# Patient Record
Sex: MALE | Race: WHITE | NOT HISPANIC OR LATINO | Employment: OTHER | URBAN - METROPOLITAN AREA
[De-identification: names, ages, dates, MRNs, and addresses within clinical notes are randomized per-mention and may not be internally consistent; named-entity substitution may affect disease eponyms.]

---

## 2017-01-21 DIAGNOSIS — K52.9 COLITIS: ICD-10-CM

## 2017-01-21 DIAGNOSIS — K92.1 BLOOD IN STOOL: ICD-10-CM

## 2017-01-21 DIAGNOSIS — K52.9 IBD (INFLAMMATORY BOWEL DISEASE): ICD-10-CM

## 2017-01-21 DIAGNOSIS — D50.9 MICROCYTIC ANEMIA: ICD-10-CM

## 2017-01-21 DIAGNOSIS — R19.7 DIARRHEA: ICD-10-CM

## 2017-01-21 DIAGNOSIS — K51.90 ULCERATIVE COLITIS WITHOUT COMPLICATIONS: ICD-10-CM

## 2017-01-21 DIAGNOSIS — Z79.899 ENCOUNTER FOR LONG-TERM (CURRENT) USE OF MEDICATIONS: ICD-10-CM

## 2017-01-22 RX ORDER — MESALAMINE 375 MG/1
CAPSULE, EXTENDED RELEASE ORAL
Qty: 120 CAPSULE | Refills: 0 | Status: SHIPPED | OUTPATIENT
Start: 2017-01-22 | End: 2017-02-18 | Stop reason: SDUPTHER

## 2017-02-18 DIAGNOSIS — K92.1 BLOOD IN STOOL: ICD-10-CM

## 2017-02-18 DIAGNOSIS — D50.9 MICROCYTIC ANEMIA: ICD-10-CM

## 2017-02-18 DIAGNOSIS — K52.9 COLITIS: ICD-10-CM

## 2017-02-18 DIAGNOSIS — K51.90 ULCERATIVE COLITIS WITHOUT COMPLICATIONS: ICD-10-CM

## 2017-02-18 DIAGNOSIS — Z79.899 ENCOUNTER FOR LONG-TERM (CURRENT) USE OF MEDICATIONS: ICD-10-CM

## 2017-02-18 DIAGNOSIS — K52.9 IBD (INFLAMMATORY BOWEL DISEASE): ICD-10-CM

## 2017-02-18 DIAGNOSIS — R19.7 DIARRHEA: ICD-10-CM

## 2017-02-18 RX ORDER — MESALAMINE 375 MG/1
CAPSULE, EXTENDED RELEASE ORAL
Qty: 120 CAPSULE | Refills: 0 | Status: SHIPPED | OUTPATIENT
Start: 2017-02-18 | End: 2017-03-26 | Stop reason: SDUPTHER

## 2017-03-26 DIAGNOSIS — K51.90 ULCERATIVE COLITIS WITHOUT COMPLICATIONS: ICD-10-CM

## 2017-03-26 DIAGNOSIS — K52.9 IBD (INFLAMMATORY BOWEL DISEASE): ICD-10-CM

## 2017-03-26 DIAGNOSIS — R19.7 DIARRHEA: ICD-10-CM

## 2017-03-26 DIAGNOSIS — Z79.899 ENCOUNTER FOR LONG-TERM (CURRENT) USE OF MEDICATIONS: ICD-10-CM

## 2017-03-26 DIAGNOSIS — D50.9 MICROCYTIC ANEMIA: ICD-10-CM

## 2017-03-26 DIAGNOSIS — K52.9 COLITIS: ICD-10-CM

## 2017-03-26 DIAGNOSIS — K92.1 BLOOD IN STOOL: ICD-10-CM

## 2017-03-27 RX ORDER — MESALAMINE 0.38 G/1
1.5 CAPSULE, EXTENDED RELEASE ORAL DAILY
Qty: 120 CAPSULE | Refills: 5 | Status: SHIPPED | OUTPATIENT
Start: 2017-03-27 | End: 2017-06-27 | Stop reason: SDUPTHER

## 2017-04-12 ENCOUNTER — OFFICE VISIT (OUTPATIENT)
Dept: INTERNAL MEDICINE | Facility: CLINIC | Age: 47
End: 2017-04-12
Payer: COMMERCIAL

## 2017-04-12 VITALS
HEIGHT: 69 IN | HEART RATE: 81 BPM | SYSTOLIC BLOOD PRESSURE: 178 MMHG | BODY MASS INDEX: 38.85 KG/M2 | DIASTOLIC BLOOD PRESSURE: 100 MMHG | WEIGHT: 262.31 LBS

## 2017-04-12 DIAGNOSIS — F41.1 GAD (GENERALIZED ANXIETY DISORDER): ICD-10-CM

## 2017-04-12 DIAGNOSIS — I10 ESSENTIAL HYPERTENSION: ICD-10-CM

## 2017-04-12 PROCEDURE — 1160F RVW MEDS BY RX/DR IN RCRD: CPT | Mod: S$GLB,,, | Performed by: INTERNAL MEDICINE

## 2017-04-12 PROCEDURE — 3080F DIAST BP >= 90 MM HG: CPT | Mod: S$GLB,,, | Performed by: INTERNAL MEDICINE

## 2017-04-12 PROCEDURE — 99999 PR PBB SHADOW E&M-EST. PATIENT-LVL III: CPT | Mod: PBBFAC,,, | Performed by: INTERNAL MEDICINE

## 2017-04-12 PROCEDURE — 3077F SYST BP >= 140 MM HG: CPT | Mod: S$GLB,,, | Performed by: INTERNAL MEDICINE

## 2017-04-12 PROCEDURE — 99213 OFFICE O/P EST LOW 20 MIN: CPT | Mod: S$GLB,,, | Performed by: INTERNAL MEDICINE

## 2017-04-12 RX ORDER — BUSPIRONE HYDROCHLORIDE 15 MG/1
15 TABLET ORAL 2 TIMES DAILY
Qty: 60 TABLET | Refills: 5 | Status: SHIPPED | OUTPATIENT
Start: 2017-04-12 | End: 2017-12-06 | Stop reason: SDUPTHER

## 2017-04-12 RX ORDER — METOPROLOL SUCCINATE 50 MG/1
50 TABLET, EXTENDED RELEASE ORAL DAILY
Qty: 30 TABLET | Refills: 2 | Status: SHIPPED | OUTPATIENT
Start: 2017-04-12 | End: 2017-06-30 | Stop reason: SDUPTHER

## 2017-04-12 NOTE — MR AVS SNAPSHOT
[facility] Internal Medicine  [address] [facility] [address]  Phone: [phone]  Fax: [phone]                  Jl Araiza   2017 10:00 AM   Office Visit    Description:  Male : 1970   Provider:  Corine Taylor MD   Department:  [facility] Internal Medicine           Reason for Visit     Hypertension           Diagnoses this Visit        Comments    Essential hypertension         MYAH (generalized anxiety disorder)                To Do List           Future Appointments        Provider Department Dept Phone    5/10/2017 10:00 AM NURSE, [facility] INTERNAL MEDICINE [facility] [phone]    2017 11:30 AM Edmund Marie MD [facility] Gastroenterology [phone]    2017 10:00 AM Corine Taylor MD [facility] [phone]      Goals (5 Years of Data)     None      Follow-Up and Disposition     Return in about 3 months (around 2017).       These Medications        Disp Refills Start End    metoprolol succinate (TOPROL-XL) 50 MG 24 hr tablet 30 tablet 2 2017     Take 1 tablet (50 mg total) by mouth once daily. - Oral    Pharmacy: [facility] [address] [place] [id number] [address] [facility] Ph #: [phone]       Notes to Pharmacy: Dose increased, cancel 25mg now.    busPIRone (BUSPAR) 15 MG tablet 60 tablet 5 2017     Take 1 tablet (15 mg total) by mouth 2 (two) times daily. - Oral    Pharmacy: [facility] [place] [id number] [facility] Ph #: [phone]         [facility]     [facility] Nurse Care Line -  Assistance  Unless otherwise directed by your provider, please contact [facility], our nurse care line that is available for  assistance.     Registered nurses in the [facility] provide: appointment scheduling, clinical advisement, health education, and other advisory services.  Call:  "[phone] (toll free)               Medications           Message regarding Medications     Verify the changes and/or additions to your medication regime listed below are the same as discussed with your clinician today.  If any of these changes or additions are incorrect, please notify your healthcare provider.        CHANGE how you are taking these medications     Start Taking Instead of    metoprolol succinate (TOPROL-XL) 50 MG 24 hr tablet metoprolol succinate (TOPROL-XL) 25 MG 24 hr tablet    Dosage:  Take 1 tablet (50 mg total) by mouth once daily. Dosage:  Take 1 tablet (25 mg total) by mouth once daily.    Reason for Change:  Dose adjustment            Verify that the below list of medications is an accurate representation of the medications you are currently taking.  If none reported, the list may be blank. If incorrect, please contact your healthcare provider. Carry this list with you in case of emergency.           Current Medications     busPIRone (BUSPAR) 15 MG tablet Take 1 tablet (15 mg total) by mouth 2 (two) times daily.    mesalamine (APRISO) 0.375 gram Cp24 Take 4 capsules (1.5 g total) by mouth once daily.    metoprolol succinate (TOPROL-XL) 50 MG 24 hr tablet Take 1 tablet (50 mg total) by mouth once daily.    nystatin (MYCOSTATIN) cream Apply topically 2 (two) times daily. Take for 2-4 weeks until resolves    predniSONE (DELTASONE) 20 MG tablet Take 20 mg by mouth once daily.           Clinical Reference Information           Your Vitals Were     BP Pulse Height Weight BMI    178/100 81 5' 9" (1.753 m) 119 kg (262 lb 4.8 oz) 38.73 kg/m2      Blood Pressure          Most Recent Value    BP  (!)  178/100      Allergies as of 4/12/2017     Cat/feline Products    Cigarette Smoke      Immunizations Administered on Date of Encounter - 4/12/2017     None      Language Assistance Services     ATTENTION: Language assistance services are available, free of charge. Please call [phone].  "     ATENCIÓN: Si habla español, tiene a garza disposición servicios gratuitos de asistencia lingüística. Jailene al [phone].     CHÚ Ý: N?u b?n nói Ti?ng Vi?t, có các d?ch v? h? tr? ngôn ng? mi?n phí dành cho b?n. G?i s? [phone].         [facility] complies with applicable Federal civil rights laws and does not discriminate on the basis of race, color, national origin, age, disability, or sex.

## 2017-04-13 NOTE — PROGRESS NOTES
Subjective:       Patient ID: Jl Araiza is a 47 y.o. male.    Chief Complaint: Hypertension    HPI Comments: Last seen 5 months ago. Blood pressure was mildly elevated in the office but normal at home, med not changed. Anxiety was improved on Buspar - continued the same. Returns for f/u feeling well. Reports home BP checked weekly ranges 130-140/88-92. Compliant with daily dosing of Metoprolol XL 25mg daily, no adverse effects. Did not have a particularly stressful morning today, blood pressure tends to rise when he comes here.     PMH:   Hypertension.   Pre-Diabetes, HbA1c 6.3%.  Ulcerative Colitis  Iron Def Anemia  Allergic Rhinitis  Depression/Anxiety  Obesity.   PSA normal 1.6, TSH normal 1.73, CBC, CMP and Lipids normal 9/16.     PSH: Umbilical Hernia Repair. EGD and Colonoscopy 6/16.     Social: Non-smoker, rare alcohol.  with an 7 yo son. .     Hep B vaccine 2015, Prevnar 4/15. Pneumovax 11/15. Tdap 11/15. Flu shot 9/16.    NKDA.    Medications: list reviewed and reconciled.               Review of Systems   Constitutional: Negative for appetite change, fatigue and unexpected weight change.   Eyes: Negative for visual disturbance.   Respiratory: Negative for cough and shortness of breath.    Cardiovascular: Negative for chest pain, palpitations and leg swelling.   Neurological: Negative for dizziness, syncope and headaches.   Psychiatric/Behavioral: Negative for agitation, behavioral problems and dysphoric mood. The patient is not nervous/anxious.        Objective:    /100, repeat by me unchanged, Pulse 81, Wt 262 lbs, BMI=38.7  Physical Exam   Constitutional: He is oriented to person, place, and time. He appears well-developed and well-nourished. No distress.   HENT:   Nose: Nose normal.   Mouth/Throat: Oropharynx is clear and moist.   Eyes: Conjunctivae are normal. No scleral icterus.   Cardiovascular: Normal rate, regular rhythm and normal heart sounds.    Pulmonary/Chest:  Effort normal and breath sounds normal. No respiratory distress. He has no wheezes. He has no rales.   Musculoskeletal: Normal range of motion. He exhibits no edema.   Neurological: He is alert and oriented to person, place, and time. No cranial nerve deficit.   Skin: Skin is warm and dry. He is not diaphoretic.   Psychiatric: He has a normal mood and affect. His behavior is normal. Thought content normal.       Assessment:       1. Essential hypertension    2. MYAH (generalized anxiety disorder)        Plan:       Essential hypertension not at goal, even by home monitoring  -     Increase Metoprolol succinate (TOPROL-XL) 50 MG 24 hr tablet; Take 1 tablet (50 mg total) by mouth once daily.  Dispense: 30 tablet; Refill: 2  -     Continue home monitoring and email readings in two weeks.   BP check scheduled with the nurse here in 4 weeks.   Encouraged diet and exercise for weight loss and cardiovascular health.    MYAH (generalized anxiety disorder) improved, continue same.  -     busPIRone (BUSPAR) 15 MG tablet; Take 1 tablet (15 mg total) by mouth 2 (two) times daily.  Dispense: 60 tablet; Refill: 5

## 2017-06-15 ENCOUNTER — PATIENT MESSAGE (OUTPATIENT)
Dept: GASTROENTEROLOGY | Facility: CLINIC | Age: 47
End: 2017-06-15

## 2017-06-27 ENCOUNTER — OFFICE VISIT (OUTPATIENT)
Dept: GASTROENTEROLOGY | Facility: CLINIC | Age: 47
End: 2017-06-27
Payer: COMMERCIAL

## 2017-06-27 ENCOUNTER — LAB VISIT (OUTPATIENT)
Dept: LAB | Facility: HOSPITAL | Age: 47
End: 2017-06-27
Attending: INTERNAL MEDICINE
Payer: COMMERCIAL

## 2017-06-27 VITALS
DIASTOLIC BLOOD PRESSURE: 95 MMHG | WEIGHT: 273.56 LBS | HEART RATE: 78 BPM | BODY MASS INDEX: 40.52 KG/M2 | HEIGHT: 69 IN | SYSTOLIC BLOOD PRESSURE: 148 MMHG

## 2017-06-27 DIAGNOSIS — K52.9 IBD (INFLAMMATORY BOWEL DISEASE): ICD-10-CM

## 2017-06-27 DIAGNOSIS — K52.9 COLITIS: ICD-10-CM

## 2017-06-27 DIAGNOSIS — K92.1 BLOOD IN STOOL: ICD-10-CM

## 2017-06-27 DIAGNOSIS — Z79.899 ENCOUNTER FOR LONG-TERM (CURRENT) USE OF MEDICATIONS: ICD-10-CM

## 2017-06-27 DIAGNOSIS — D50.9 MICROCYTIC ANEMIA: ICD-10-CM

## 2017-06-27 DIAGNOSIS — K51.90 ULCERATIVE COLITIS WITHOUT COMPLICATIONS, UNSPECIFIED LOCATION: ICD-10-CM

## 2017-06-27 LAB
25(OH)D3+25(OH)D2 SERPL-MCNC: 46 NG/ML
ALBUMIN SERPL BCP-MCNC: 3.8 G/DL
ALP SERPL-CCNC: 94 U/L
ALT SERPL W/O P-5'-P-CCNC: 25 U/L
ANION GAP SERPL CALC-SCNC: 11 MMOL/L
AST SERPL-CCNC: 19 U/L
BASOPHILS # BLD AUTO: 0.02 K/UL
BASOPHILS NFR BLD: 0.3 %
BILIRUB SERPL-MCNC: 1.1 MG/DL
BUN SERPL-MCNC: 14 MG/DL
CALCIUM SERPL-MCNC: 9.6 MG/DL
CHLORIDE SERPL-SCNC: 107 MMOL/L
CO2 SERPL-SCNC: 21 MMOL/L
CREAT SERPL-MCNC: 1 MG/DL
DIFFERENTIAL METHOD: ABNORMAL
EOSINOPHIL # BLD AUTO: 0.2 K/UL
EOSINOPHIL NFR BLD: 2.5 %
ERYTHROCYTE [DISTWIDTH] IN BLOOD BY AUTOMATED COUNT: 15.1 %
EST. GFR  (AFRICAN AMERICAN): >60 ML/MIN/1.73 M^2
EST. GFR  (NON AFRICAN AMERICAN): >60 ML/MIN/1.73 M^2
GLUCOSE SERPL-MCNC: 84 MG/DL
HCT VFR BLD AUTO: 46.3 %
HGB BLD-MCNC: 15.6 G/DL
LYMPHOCYTES # BLD AUTO: 2.3 K/UL
LYMPHOCYTES NFR BLD: 33.4 %
MCH RBC QN AUTO: 26.8 PG
MCHC RBC AUTO-ENTMCNC: 33.7 %
MCV RBC AUTO: 80 FL
MONOCYTES # BLD AUTO: 0.7 K/UL
MONOCYTES NFR BLD: 10.8 %
NEUTROPHILS # BLD AUTO: 3.6 K/UL
NEUTROPHILS NFR BLD: 52.7 %
PLATELET # BLD AUTO: 269 K/UL
PMV BLD AUTO: 8.8 FL
POTASSIUM SERPL-SCNC: 4.5 MMOL/L
PROT SERPL-MCNC: 7.5 G/DL
RBC # BLD AUTO: 5.82 M/UL
SODIUM SERPL-SCNC: 139 MMOL/L
VIT B12 SERPL-MCNC: 534 PG/ML
WBC # BLD AUTO: 6.83 K/UL

## 2017-06-27 PROCEDURE — 99999 PR PBB SHADOW E&M-EST. PATIENT-LVL V: CPT | Mod: PBBFAC,,, | Performed by: INTERNAL MEDICINE

## 2017-06-27 PROCEDURE — 85025 COMPLETE CBC W/AUTO DIFF WBC: CPT

## 2017-06-27 PROCEDURE — 82306 VITAMIN D 25 HYDROXY: CPT

## 2017-06-27 PROCEDURE — 99213 OFFICE O/P EST LOW 20 MIN: CPT | Mod: S$GLB,,, | Performed by: INTERNAL MEDICINE

## 2017-06-27 PROCEDURE — 36415 COLL VENOUS BLD VENIPUNCTURE: CPT

## 2017-06-27 PROCEDURE — 82607 VITAMIN B-12: CPT

## 2017-06-27 PROCEDURE — 80053 COMPREHEN METABOLIC PANEL: CPT

## 2017-06-27 RX ORDER — MESALAMINE 0.38 G/1
1.5 CAPSULE, EXTENDED RELEASE ORAL DAILY
Qty: 120 CAPSULE | Refills: 5 | Status: SHIPPED | OUTPATIENT
Start: 2017-06-27 | End: 2018-01-03 | Stop reason: SDUPTHER

## 2017-06-27 NOTE — PROGRESS NOTES
CHIEF COMPLAINT:  Followup of left-sided colitis.     HISTORY OF PRESENT ILLNESS:  This is a 47-year-old male who has left-sided   ulcerative colitis whose symptoms have been going on for at least 6 years.  It   appears that he had a colonoscopy back on 10/25/2010, by one of the colorectal   surgeons, Dr. Som Clemons, complaining of rectal bleeding back then.  He was   found to have ulcerated mucosa from the rectum all the way to the descending   colon.  Biopsies were taken at that time, which was mild chronic active colitis.    The patient was referred to the GI Clinic on 04/22/2015, by his primary care   doctor, Dr. Liam Hayden, for blood in his stool and frequent bowel movements.    The patient up to that time and currently had never been prescribed really any   medication for treatment of his colitis.  He was on no medicines whatsoever.  He   had never been on biologics or immunomodulators or steroids or antibiotics or   5-ASAs for this matter at that time back in April 2015.  He did undergo as part   of our workup, a colonoscopy for further evaluation, but the patient delayed   that until October 2014.  It was actually done by Dr. Zuniga, which showed   ulcerative colitis in the same location before rectum, sigmoid and descending   colon.  Pathology came back consistent active colitis and crypt abscess   formation, small foci suggested of granulation formation, no dysplasia   identified.  The patient was started on 5-ASA medicine, Apriso 4 pills a day.    He was intolerant to Canasa suppositories and Rowasa enemas.  His stool has been   checked for C. diff and it has been negative.  Stool cultures are negative.  E.   coli is negative.  Strongyloides antibody is negative.  Anti-E histolytica   antibody is also negative.  The patient's QuantiFERON Gold TB blood test has   been negative.  The patient has immunity to hepatitis B.  He was offered a   vaccination for hepatitis A and he has received the Pneumovax,  13-valent and   23-valent pneumococcal vaccine as well as the Tdap vaccine.  The patient   underwent a followup colonoscopy on 06/15/2016, for chronic diarrhea.  He was   found to have moderate-to-severe left-sided ulcerative colitis.  Biopsies came   back active disease, but no dysplasia and no granulomas.  They are consistent   with inflammatory bowel disease.  Stool for C. diff was negative.  The patient   was empirically started on vancomycin, which he said originally made him feel   much better 125 mg every 6 hours for 10 days.  He felt his symptoms improved and   his stools got more firm.  He is now currently off that.  He is here to talk   about further treatment.  He says he is back to his baseline and normal BM once daily  And solid and no can safely pass gas.  He is only on Apriso 4 pills a day and   Past due for follow up colonoscopy to check for healing.     REVIEW OF SYSTEMS:  CONSTITUTIONAL:  No fever, fatigue or weight loss.  ENT:  No difficulty swallowing or sore throat.  CARDIOVASCULAR:  No chest pain or palpitations.  RESPIRATORY:  No shortness of breath or cough.  GENITOURINARY:  No dysuria, urgency or frequency.  MUSCULOSKELETAL:  No arthritis.  No NSAID use.  SKIN:  No itching or rash.  NEUROLOGIC:  No headache, syncope or stroke.  PSYCHIATRIC:  No uncontrolled depression or anxiety.  ENDOCRINE:  No cold or heat intolerance.  LYMPHATICS:  No lymphadenopathy.  GASTROINTESTINAL:  No nausea or vomiting.  No heartburn.  No abdominal pain.  He   has diarrhea, but no constipation and no blood in his stool.  He is having   About 1-2 bowel movements a day.  EYES:  He never had episcleritis, uveitis or scleritis.     ALLERGIES:  He is allergic to feline, cat products and cigarette smoke.     FAMILY HISTORY:  Dad  of Cushing's syndrome.  Nobody with colon cancer.    Nobody with advanced colon adenomatous polyps before the age of 60.  No FAP, no   attenuated FAP, no MAP and no Jean syndrome.  Nobody  "with celiac sprue or   inflammatory bowel disease.  Nobody with any GI malignancies.  His mom was a   heavy drinker and  at the age of 54 of liver cancer and cirrhosis.     PAST SURGICAL HISTORY:  He has had umbilical hernia repairs.  Left index finger   was cut with a table saw about 14-1/2 years ago.     RISK FACTORS FOR LIVER DISEASE:  Professional tattoo.  No blood transfusion.  No   IV drugs.  No nasal drug use.  Elevated BMI of 37.90.  He does drink alcohol in   moderation.     SOCIAL HISTORY:  A nonsmoker.  He does drink, but very rarely.  He works for   himself as a .  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  He is   on his first marriage since .  His wife teaches at [employer].  She teaches ninth   through twelfth grade.  He has only 1 child who is 9-1/2 years old and likes   programing and playing video games.     PHYSICAL EXAMINATION:  BP (!) 148/95   Pulse 78   Ht 5' 9" (1.753 m)   Wt 124.1 kg (273 lb 9.5 oz)   BMI 40.40 kg/m²   GENERAL APPEARANCE:  Well nourished, well developed, not in any acute distress.  ABDOMEN:  Obese, but soft.  No guarding.  No rebound.  No tenderness.  No   palpable organomegaly.  No bruits.  No pulsatile masses.  No stigmata of chronic   liver disease.  No appreciative ascites or hernias.  Normoactive bowel sounds.  CARDIOVASCULAR:  S1 and S2 without murmurs, gallops or rubs.  RESPIRATORY:  Clear to auscultation bilaterally without wheezes, rhonchi or   rales.  SKIN:  No petechiae or rash on exposed skin areas.  NEUROLOGIC:  Alert and oriented x4.  PSYCHIATRIC:  Normal speech, mentation and affect.  LYMPHATICS:  No cervical or supraclavicular lymphadenopathy.  ENDOCRINE:  No appreciative thyromegaly.  EYES:  Conjunctivae are nonicteric.  CARDIOVASCULAR:  S1 and S2 without murmurs, gallops or rubs.  RESPIRATORY:  Clear to auscultation bilaterally without wheezes, rhonchi or   rales.     MEDICAL DECISION MAKING:  As above.  5-ASA talk given.  Steroid talk " given.    Referral talk given to IBD clinic.     IMPRESSION AND PLAN:  1.  Left-sided ulcerative colitis, probably for at least 7 years.  It seems like   it has not progressed in extent of colon involved, he is on Apriso 4 pills a day, he   Saw Dr. Booth in IBD Clinic for evaluation and treatment and was placed on a oral steroid taper  Which he said did the trick and now in clinical remission and recommend colonoscopy for follow up  To check for mucosal healing.  2.  Vaccinations are up-to-date.    3. Recommend the patient return to GI Clinic with me in 6 months for followup.  4.  Elevated blood pressure.  We will recommend the patient follow up with his   primary care doctor for better control.

## 2017-06-30 DIAGNOSIS — I10 ESSENTIAL HYPERTENSION: ICD-10-CM

## 2017-07-02 RX ORDER — METOPROLOL SUCCINATE 50 MG/1
TABLET, EXTENDED RELEASE ORAL
Qty: 30 TABLET | Refills: 2 | Status: SHIPPED | OUTPATIENT
Start: 2017-07-02 | End: 2017-09-28 | Stop reason: SDUPTHER

## 2017-07-05 ENCOUNTER — HOSPITAL ENCOUNTER (OUTPATIENT)
Dept: RADIOLOGY | Facility: CLINIC | Age: 47
Discharge: HOME | End: 2017-07-05
Attending: INTERNAL MEDICINE
Payer: COMMERCIAL

## 2017-07-05 ENCOUNTER — TELEPHONE (OUTPATIENT)
Dept: ENDOSCOPY | Facility: HOSPITAL | Age: 47
End: 2017-07-05

## 2017-07-05 DIAGNOSIS — D50.9 MICROCYTIC ANEMIA: ICD-10-CM

## 2017-07-05 DIAGNOSIS — K92.1 BLOOD IN STOOL: ICD-10-CM

## 2017-07-05 DIAGNOSIS — K51.90 ULCERATIVE COLITIS WITHOUT COMPLICATIONS, UNSPECIFIED LOCATION: ICD-10-CM

## 2017-07-05 DIAGNOSIS — Z79.899 ENCOUNTER FOR LONG-TERM (CURRENT) USE OF MEDICATIONS: ICD-10-CM

## 2017-07-05 DIAGNOSIS — K52.9 IBD (INFLAMMATORY BOWEL DISEASE): ICD-10-CM

## 2017-07-05 DIAGNOSIS — K52.9 COLITIS: ICD-10-CM

## 2017-07-05 DIAGNOSIS — Z12.11 SPECIAL SCREENING FOR MALIGNANT NEOPLASMS, COLON: Primary | ICD-10-CM

## 2017-07-05 PROCEDURE — 77080 DXA BONE DENSITY AXIAL: CPT | Mod: TC

## 2017-07-05 PROCEDURE — 77080 DXA BONE DENSITY AXIAL: CPT | Mod: 26,,, | Performed by: INTERNAL MEDICINE

## 2017-07-05 RX ORDER — POLYETHYLENE GLYCOL 3350, SODIUM SULFATE ANHYDROUS, SODIUM BICARBONATE, SODIUM CHLORIDE, POTASSIUM CHLORIDE 236; 22.74; 6.74; 5.86; 2.97 G/4L; G/4L; G/4L; G/4L; G/4L
4 POWDER, FOR SOLUTION ORAL ONCE
Qty: 4000 ML | Refills: 0 | Status: SHIPPED | OUTPATIENT
Start: 2017-07-05 | End: 2017-07-05

## 2017-07-10 ENCOUNTER — TELEPHONE (OUTPATIENT)
Dept: GASTROENTEROLOGY | Facility: CLINIC | Age: 47
End: 2017-07-10

## 2017-07-10 NOTE — TELEPHONE ENCOUNTER
----- Message from Edmund Marie MD sent at 7/9/2017 12:41 PM CDT -----  Silvina please tell patient that their bone density was read as follows with the following recommendations:    Normal spine and hip BMD. Total hip BMD declined 5% since 2015 and lumbar spine increased 3%.    Recommendations:  1) Adequate calcium and Vitamin D therapy  2) Appropriate exercise  3) No need to repeat BMD in near future unless taking medication associated with bone loss or other clinical change.

## 2017-07-13 ENCOUNTER — PATIENT MESSAGE (OUTPATIENT)
Dept: ADMINISTRATIVE | Facility: OTHER | Age: 47
End: 2017-07-13

## 2017-07-13 ENCOUNTER — OFFICE VISIT (OUTPATIENT)
Dept: INTERNAL MEDICINE | Facility: CLINIC | Age: 47
End: 2017-07-13
Payer: COMMERCIAL

## 2017-07-13 VITALS
WEIGHT: 273.38 LBS | SYSTOLIC BLOOD PRESSURE: 140 MMHG | HEIGHT: 69 IN | DIASTOLIC BLOOD PRESSURE: 100 MMHG | BODY MASS INDEX: 40.49 KG/M2 | HEART RATE: 89 BPM

## 2017-07-13 DIAGNOSIS — F41.1 GAD (GENERALIZED ANXIETY DISORDER): ICD-10-CM

## 2017-07-13 DIAGNOSIS — E66.01 MORBID OBESITY WITH BMI OF 40.0-44.9, ADULT: ICD-10-CM

## 2017-07-13 DIAGNOSIS — Z12.5 PROSTATE CANCER SCREENING: ICD-10-CM

## 2017-07-13 DIAGNOSIS — R73.03 PRE-DIABETES: ICD-10-CM

## 2017-07-13 DIAGNOSIS — I10 ESSENTIAL HYPERTENSION: Primary | ICD-10-CM

## 2017-07-13 PROCEDURE — 99214 OFFICE O/P EST MOD 30 MIN: CPT | Mod: S$GLB,,, | Performed by: INTERNAL MEDICINE

## 2017-07-13 PROCEDURE — 99999 PR PBB SHADOW E&M-EST. PATIENT-LVL III: CPT | Mod: PBBFAC,,, | Performed by: INTERNAL MEDICINE

## 2017-07-13 RX ORDER — HYDROCHLOROTHIAZIDE 25 MG/1
25 TABLET ORAL DAILY
Qty: 30 TABLET | Refills: 3 | Status: SHIPPED | OUTPATIENT
Start: 2017-07-13 | End: 2017-08-30 | Stop reason: DRUGHIGH

## 2017-07-13 NOTE — PROGRESS NOTES
Subjective:      Patient ID: Jl Araiza is a 47 y.o. male.    Chief Complaint: Hypertension    Jl was last seen by me on 4/12/2017.    Last seen 3 months ago. Hypertension was not at goal, increased Metoprolol XL from 25 to 50mg daily. Missed his nurse visit for BP check. Returns today reporting home /90 before coming this morning, possibly elevated now due to stress. His monitor reads close to ours and is likely accurate, but I feel Jl would benefit greatly from the Digital Medicine Hypertension management program for more timely adjustments in treatment.      PMH:   Hypertension.   Pre-Diabetes, HbA1c 6.3%.  Ulcerative Colitis  Iron Def Anemia  Allergic Rhinitis  Depression/Anxiety  Obesity.   PSA normal 1.6, TSH normal 1.73, CBC, CMP and Lipids normal 9/16. Labs 6/17 by GI include normal CBC, CMP (Glucose 84), Vit D 46, B12 534, Urinalysis clear. BMD normal 6/17.     PSH: Umbilical Hernia Repair. EGD and Colonoscopy 6/16 - repeat scheduled 8/4/17.     Social: Non-smoker, rare alcohol.  with an 7 yo son. .     Hep B vaccine 2015, Prevnar 4/15. Pneumovax 11/15. Tdap 11/15. Flu shot 9/16.    NKDA.    Medications: list reviewed and reconciled.                   Review of Systems   Constitutional: Negative for chills, diaphoresis, fatigue and fever.   Eyes: Negative for visual disturbance.   Respiratory: Negative for cough and shortness of breath.    Cardiovascular: Negative for chest pain, palpitations and leg swelling.   Neurological: Negative for dizziness, syncope, weakness and headaches.     Objective:   Physical Exam   Constitutional: He is oriented to person, place, and time. He appears well-developed and well-nourished. No distress.   HENT:   Nose: Nose normal.   Mouth/Throat: Oropharynx is clear and moist.   Neck: Normal range of motion. Neck supple. No JVD present.   Cardiovascular: Normal rate, regular rhythm and normal heart sounds.    Pulmonary/Chest: Effort normal  and breath sounds normal. No respiratory distress. He has no wheezes. He has no rales.   Musculoskeletal: Normal range of motion. He exhibits no edema.   Neurological: He is alert and oriented to person, place, and time.   Skin: Skin is warm and dry. He is not diaphoretic.   Psychiatric: He has a normal mood and affect. His behavior is normal. Judgment and thought content normal.   /100, 150/104, his machine 164/106, Pulse 89, Wt 273 lbs (from 262), BMI=40    Assessment and Plan     Essential hypertension  -     Hypertension Digital Medicine ([facility]) Enrollment Order  -     Hypertension Digital Medicine ([facility]): Assign Onboarding Questionnaires  -     Add Hydrochlorothiazide (HYDRODIURIL) 25 MG tablet; Take 1 tablet (25 mg total) by mouth once daily. For Hypertension.  Dispense: 30 tablet; Refill: 3  -     Lipid panel; Future; Expected date: 07/13/2017  -     Comprehensive metabolic panel; Future; Expected date: 07/13/2017    Pre-diabetes  -     Hemoglobin A1c; Future; Expected date: 07/13/2017    MYAH (generalized anxiety disorder) - stable on Buspar.    Morbid obesity with BMI of 40.0-44.9, adult - counseled on diet.     Prostate cancer screening  -     PSA, Screening; Future; Expected date: 07/13/2017    Fasting labs due in 6 weeks for annual screening.    Return in about 3 months (around 10/13/2017).

## 2017-07-18 ENCOUNTER — PATIENT OUTREACH (OUTPATIENT)
Dept: OTHER | Facility: OTHER | Age: 47
End: 2017-07-18

## 2017-07-18 NOTE — LETTER
Kiana Jeff, PharmD  [address] [place] [address]     Dear Jl Araiza,    Welcome to the [facility] Program!         My name is Kiana Jeff PharmD and I am your dedicated Digital Medicine clinician.  As an expert in medication management, I will help ensure that the medications you are taking continue to provide you with the intended benefits.      I am Katie Lovett and I will be your health  for the duration of the program.  My  job is to help you identify lifestyle changes to improve your blood pressure control.  We will talk about nutrition, exercise, and other ways that you may be able to adjust your current habits to better your health. Together, we will work to improve your overall health and encourage you to meet your goals for a healthier lifestyle.    What we expect from YOU:    You will need to take blood pressure readings multiple times a week and no less than one reading per week.   It is important that you take your measurements at different times during the day, when possible.     What you should expect from your Digital Medicine Care Team:   We will provide you with education about high blood pressure, including lifestyle changes that could help you to control your blood pressure.   We will review your weekly readings and provide you with monthly blood pressure progress reports after you have been in the program for more than 30 days.   We will send monthly progress reports on your blood pressure control to your physician so they can follow along with your progress as well.    You will be able to reach me by phone at [phone] or through your [facility] account by clicking my name under Care Team on the right side of the home screen.    I look forward to working with you to achieve your blood pressure goals!    Sincerely,    Kiana Jeff PharmZORAIDA  Your personal clinician    Please visit  [email or web address] to learn more about high blood pressure and what you can do lower your blood pressure.                                                                                           Jl Araiza  [address] [place] [id number]

## 2017-07-18 NOTE — TELEPHONE ENCOUNTER
[facility] Digital Medicine Program Medication Reconciliation Outreach    Called patient to introduce him/her into the HDMP. Reviewed program details including blood pressure goals, technique for taking readings (timing, cuff placement, body positioning, iHealth tray), and what to do in case of emergency. Introduced patient to members of care team (health , clinician, and responsible provider). Verified patient's understanding of [facility] My[facility] tray use for contacting clinical team and to ensure that iHealth cuff readings continue to transmit by logging in once every 2 weeks. Confirmation text sent and patient received.  Pt has been taking BP daily. Reports that he logs into both [employer] and iHealth apps daily to ensure that readings are being transmitted.  Pt is currently taking all medications in the evening. Reviewed results of MADDY screening but pt is not interested in referral at this time.    Screening Questionnaire Review:  1. Depression - not indicated  2. Sleep apnea - yes     MADDY screening results for this patient suggest a high likelihood of sleep apnea, which can contribute to hypertension. Patient has not been previously diagnosed with sleep apnea and is not interested in referral at this time.    Patient reports the following symptoms of sleep apnea: tossing and turning, awakening in the middle of the night. Reports that snoring is not an issue.       Verified the following information with the patient:  1. Medication list  Current Outpatient Prescriptions on File Prior to Visit   Medication Sig Dispense Refill    busPIRone (BUSPAR) 15 MG tablet Take 1 tablet (15 mg total) by mouth 2 (two) times daily. 60 tablet 5    hydrochlorothiazide (HYDRODIURIL) 25 MG tablet Take 1 tablet (25 mg total) by mouth once daily. For Hypertension. 30 tablet 3    mesalamine (APRISO) 0.375 gram Cp24 Take 4 capsules (1.5 g total) by mouth once daily. 120 capsule 5    metoprolol succinate (TOPROL-XL) 50 MG 24 hr tablet TAKE  1 TABLET BY MOUTH ONCE DAILY 30 tablet 2     No current facility-administered medications on file prior to visit.        Previous ADRs  NKDA    2. Medication compliance: has been compliant with the medicaiton regimen    3. Medication Allergies:   Review of patient's allergies indicates:   Allergen Reactions    Cat/feline products      Difficulty breathing    Cigarette smoke      Difficulty breathing       Explained that our goal is to get his BP consistently below 140/90mmHg.  Patient denies having further questions, concerns. BP is not at goal.         Last 5 Patient Entered Redings Current 30 Day Average: 138/98     Recent Readings 7/18/2017 7/17/2017 7/16/2017 7/15/2017 7/14/2017    Systolic BP (mmHg) 149 123 131 147 124    Diastolic BP (mmHg) 102 97 98 93 90    Pulse 76 77 85 69 76

## 2017-07-21 ENCOUNTER — PATIENT OUTREACH (OUTPATIENT)
Dept: OTHER | Facility: OTHER | Age: 47
End: 2017-07-21

## 2017-07-21 NOTE — PROGRESS NOTES
"Last 5 Patient Entered Redings Current 30 Day Average: 137/96     Recent Readings 7/23/2017 7/22/2017 7/20/2017 7/20/2017 7/19/2017    Systolic BP (mmHg) 142 137 138 154 129    Diastolic BP (mmHg) 97 92 95 110 88    Pulse 75 77 77 74 75        Initial introduction completed with the pt and the role of the health  was explained.   We discussed the following information:  Exercise - Ms. Araiza has an Apple Watch and exceeds his "move goal" at least 5 days each week. He reports having a sedentary job, but he has been trying to more around more at work. Patient was previously a member to [facility] [employer] ([facility]), but cancelled his membership because of travel time.   Diet - Patient reports that he does maintain a low sodium diet.    Resources on diet and exercise were sent.     Pt aware that I am not available for emergencies and to call 911 or [employer] on call if one arises.  Pt aware of the importance of medication adherence.  Pt aware of the importance of diet and exercise.  Pt aware that his sodium intake should be no more than 2000mg per day.  Pt aware that the recommended physical activity each week should be about 30 minutes per day at least 5 times per week.   Pt aware of the importance of taking BP readings at least weekly if not more and during different times each day.  Pt aware that the health  can be used as a resource for lifestyle modifications to help reduce or maintain a healthy BP      "

## 2017-07-27 ENCOUNTER — OFFICE VISIT (OUTPATIENT)
Dept: PSYCHIATRY | Facility: CLINIC | Age: 47
End: 2017-07-27
Payer: COMMERCIAL

## 2017-07-27 DIAGNOSIS — F41.1 GENERALIZED ANXIETY DISORDER: Primary | ICD-10-CM

## 2017-07-27 PROCEDURE — 90834 PSYTX W PT 45 MINUTES: CPT | Mod: S$GLB,,, | Performed by: SOCIAL WORKER

## 2017-07-27 NOTE — PROGRESS NOTES
Individual Psychotherapy (PhD/LCSW)    7/27/2017    Site:  [facility]         Therapeutic Intervention: Met with patient.  Outpatient - Insight oriented psychotherapy 45 min - CPT code 65869    Chief complaint/reason for encounter: depression     Interval history and content of current session: Pt has not been seen in 16 months.  He returns due to concerns that he compulsively overeats.  He has had a good deal of gastro problems in the past year and now has a diagnosis of colitis.  He took prednisone for a while which increased his weight by 25 lbs which is about what he had lost in the past.  He is now discouraged by his inability to control his snacking during the day when he is bored by work.  He feels he has an oral fixation as he sucked his thumb until he was in middle school.   Discussed several behavioral interventions and he will return in 3 weeks to discuss further.    Treatment plan:  · Target symptoms: depression  · Why chosen therapy is appropriate versus another modality: relevant to diagnosis  · Outcome monitoring methods: self-report, observation  · Therapeutic intervention type: insight oriented psychotherapy    Risk parameters:  Patient reports no suicidal ideation  Patient reports no homicidal ideation  Patient reports no self-injurious behavior  Patient reports no violent behavior    Verbal deficits: None    Patient's response to intervention:  The patient's response to intervention is motivated.    Progress toward goals and other mental status changes:  The patient's progress toward goals is good.    Diagnosis: 311    Plan:  individual psychotherapy    Return to clinic: 1 week

## 2017-08-04 ENCOUNTER — SURGERY (OUTPATIENT)
Age: 47
End: 2017-08-04

## 2017-08-04 ENCOUNTER — ANESTHESIA (OUTPATIENT)
Dept: ENDOSCOPY | Facility: HOSPITAL | Age: 47
End: 2017-08-04
Payer: COMMERCIAL

## 2017-08-04 ENCOUNTER — HOSPITAL ENCOUNTER (OUTPATIENT)
Facility: HOSPITAL | Age: 47
Discharge: HOME | End: 2017-08-04
Attending: INTERNAL MEDICINE | Admitting: INTERNAL MEDICINE
Payer: COMMERCIAL

## 2017-08-04 ENCOUNTER — ANESTHESIA EVENT (OUTPATIENT)
Dept: ENDOSCOPY | Facility: HOSPITAL | Age: 47
End: 2017-08-04
Payer: COMMERCIAL

## 2017-08-04 VITALS
TEMPERATURE: 98 F | WEIGHT: 263.63 LBS | RESPIRATION RATE: 16 BRPM | HEIGHT: 69 IN | DIASTOLIC BLOOD PRESSURE: 88 MMHG | BODY MASS INDEX: 39.05 KG/M2 | OXYGEN SATURATION: 98 % | HEART RATE: 79 BPM | SYSTOLIC BLOOD PRESSURE: 149 MMHG

## 2017-08-04 VITALS — RESPIRATION RATE: 17 BRPM

## 2017-08-04 DIAGNOSIS — K51.90 ULCERATIVE COLITIS: ICD-10-CM

## 2017-08-04 DIAGNOSIS — K51.511 LEFT SIDED ULCERATIVE COLITIS WITH RECTAL BLEEDING: Primary | ICD-10-CM

## 2017-08-04 PROCEDURE — 88305 TISSUE EXAM BY PATHOLOGIST: CPT | Mod: 26,,, | Performed by: PATHOLOGY

## 2017-08-04 PROCEDURE — 88305 TISSUE EXAM BY PATHOLOGIST: CPT | Performed by: PATHOLOGY

## 2017-08-04 PROCEDURE — D9220A PRA ANESTHESIA: Mod: 33,ANES,, | Performed by: ANESTHESIOLOGY

## 2017-08-04 PROCEDURE — 27201012 HC FORCEPS, HOT/COLD, DISP: Performed by: INTERNAL MEDICINE

## 2017-08-04 PROCEDURE — 37000009 HC ANESTHESIA EA ADD 15 MINS: Performed by: INTERNAL MEDICINE

## 2017-08-04 PROCEDURE — 45380 COLONOSCOPY AND BIOPSY: CPT | Performed by: INTERNAL MEDICINE

## 2017-08-04 PROCEDURE — 63600175 PHARM REV CODE 636 W HCPCS: Performed by: NURSE ANESTHETIST, CERTIFIED REGISTERED

## 2017-08-04 PROCEDURE — D9220A PRA ANESTHESIA: Mod: 33,CRNA,, | Performed by: NURSE ANESTHETIST, CERTIFIED REGISTERED

## 2017-08-04 PROCEDURE — 25000003 PHARM REV CODE 250: Performed by: INTERNAL MEDICINE

## 2017-08-04 PROCEDURE — 45380 COLONOSCOPY AND BIOPSY: CPT | Mod: 33,,, | Performed by: INTERNAL MEDICINE

## 2017-08-04 PROCEDURE — 37000008 HC ANESTHESIA 1ST 15 MINUTES: Performed by: INTERNAL MEDICINE

## 2017-08-04 RX ORDER — PROPOFOL 10 MG/ML
VIAL (ML) INTRAVENOUS CONTINUOUS PRN
Status: DISCONTINUED | OUTPATIENT
Start: 2017-08-04 | End: 2017-08-04

## 2017-08-04 RX ORDER — PROPOFOL 10 MG/ML
VIAL (ML) INTRAVENOUS
Status: DISCONTINUED | OUTPATIENT
Start: 2017-08-04 | End: 2017-08-04

## 2017-08-04 RX ORDER — LIDOCAINE HCL/PF 100 MG/5ML
SYRINGE (ML) INTRAVENOUS
Status: DISCONTINUED | OUTPATIENT
Start: 2017-08-04 | End: 2017-08-04

## 2017-08-04 RX ORDER — SODIUM CHLORIDE 9 MG/ML
INJECTION, SOLUTION INTRAVENOUS CONTINUOUS
Status: DISCONTINUED | OUTPATIENT
Start: 2017-08-04 | End: 2017-08-04 | Stop reason: HOSPADM

## 2017-08-04 RX ADMIN — LIDOCAINE HYDROCHLORIDE 100 MG: 20 INJECTION, SOLUTION INTRAVENOUS at 10:08

## 2017-08-04 RX ADMIN — PROPOFOL 100 MG: 10 INJECTION, EMULSION INTRAVENOUS at 10:08

## 2017-08-04 RX ADMIN — SODIUM CHLORIDE: 0.9 INJECTION, SOLUTION INTRAVENOUS at 09:08

## 2017-08-04 RX ADMIN — PROPOFOL 200 MCG/KG/MIN: 10 INJECTION, EMULSION INTRAVENOUS at 10:08

## 2017-08-04 NOTE — TRANSFER OF CARE
"Anesthesia Transfer of Care Note    Patient: Jl Araiza    Procedure(s) Performed: Procedure(s) (LRB):  COLONOSCOPY (N/A)    Patient location: GI    Anesthesia Type: general    Transport from OR: Transported from OR on room air with adequate spontaneous ventilation    Post pain: adequate analgesia    Post assessment: no apparent anesthetic complications and tolerated procedure well    Post vital signs: stable    Level of consciousness: awake and alert    Nausea/Vomiting: no vomiting    Complications: none    Transfer of care protocol was followed      Last vitals:   Visit Vitals  BP (!) 157/89 (BP Location: Left arm, Patient Position: Lying, BP Method: Automatic)   Pulse 91   Temp 37 °C (98.6 °F) (Oral)   Resp 18   Ht 5' 9" (1.753 m)   Wt 119.6 kg (263 lb 9.6 oz)   SpO2 95%   BMI 38.93 kg/m²     "

## 2017-08-04 NOTE — ANESTHESIA PREPROCEDURE EVALUATION
08/04/2017  Jl Araiza is a 47 y.o., male.    Anesthesia Evaluation    I have reviewed the Patient Summary Reports.    I have reviewed the Nursing Notes.      Review of Systems  Anesthesia Hx:  No problems with previous Anesthesia    Hematology/Oncology:  Hematology Normal   Oncology Normal     EENT/Dental:EENT/Dental Normal   Cardiovascular:   Hypertension hyperlipidemia    Pulmonary:  Pulmonary Normal    Renal/:  Renal/ Normal     Hepatic/GI:   PUD, UC (ulcerative colitis)   Musculoskeletal:  Musculoskeletal Normal    Neurological:  Neurology Normal    Endocrine:  Endocrine Normal    Dermatological:  Skin Normal    Psych:   anxiety          Physical Exam  General:  Obesity    Airway/Jaw/Neck:  Airway Findings: Mouth Opening: Normal Tongue: Normal  General Airway Assessment: Adult  Mallampati: II  TM Distance: Normal, at least 6 cm        Eyes/Ears/Nose:  EYES/EARS/NOSE FINDINGS: Normal   Dental:  Dental Findings: In tact   Chest/Lungs:  Chest/Lungs Clear    Heart/Vascular:  Heart Findings: Normal Heart murmur: negative Vascular Findings: Normal    Abdomen:  Abdomen Findings: Normal    Musculoskeletal:  Musculoskeletal Findings: Normal   Skin:  Skin Findings: Normal    Mental Status:  Mental Status Findings: Normal        Anesthesia Plan  Type of Anesthesia, risks & benefits discussed:  Anesthesia Type:  general  Patient's Preference:   Intra-op Monitoring Plan:   Intra-op Monitoring Plan Comments:   Post Op Pain Control Plan:   Post Op Pain Control Plan Comments:   Induction:   IV  Beta Blocker:  Patient is on a Beta-Blocker and has received one dose within the past 24 hours (No further documentation required).       Informed Consent: Patient understands risks and agrees with Anesthesia plan.  Questions answered. Anesthesia consent signed with patient.  ASA Score: 2     Day of Surgery Review of  History & Physical:    H&P update referred to the surgeon.         Ready For Surgery From Anesthesia Perspective.

## 2017-08-04 NOTE — H&P
Short Stay Endoscopy History and Physical    PCP - Corine Taylor MD  Referring Physician - Edmund Marie MD  [address] [place] [address]    Procedure - Colonoscopy  ASA - per anesthesia  Mallampati - per anesthesia  History of Anesthesia problems - no  Family history Anesthesia problems -  no   Plan of anesthesia - General    HPI  47 y.o. male  Reason for procedure:  Ulcerative colitis surveillance      ROS:  Constitutional: No fevers, chills, No weight loss  CV: No chest pain  Pulm: No cough, No shortness of breath  GI: see HPI    Medical History:  has a past medical history of Abdominal hernia; Anxiety; Essential hypertension (11/2/2015); Hyperlipidemia; Obese; and UC (ulcerative colitis).    Surgical History:  has a past surgical history that includes Hernia repair (Umbilical Hernia); Colonoscopy (N/A, 6/15/2016); Esophagogastroduodenoscopy; and Vasectomy.    Family History: family history includes Alcohol abuse in his father and mother; Cancer in his mother; Cirrhosis (age of onset: 54) in his mother; Cushing syndrome in his father; Liver cancer in his maternal uncle; Liver cancer (age of onset: 54) in his mother; No Known Problems in his maternal aunt, maternal grandfather, maternal grandmother, paternal aunt, paternal grandmother, paternal uncle, and sister; Stroke in his paternal grandfather.. Otherwise no colon cancer, inflammatory bowel disease, or GI malignancies.    Social History:  reports that he has never smoked. He has never used smokeless tobacco. He reports that he does not drink alcohol or use drugs.    Review of patient's allergies indicates:   Allergen Reactions    Cat/feline products Anaphylaxis     Difficulty breathing    Cigarette smoke Shortness Of Breath     Difficulty breathing       Medications:   Prescriptions Prior to Admission   Medication Sig Dispense Refill Last Dose    busPIRone (BUSPAR) 15 MG tablet Take 1 tablet (15 mg total) by mouth 2 (two) times  daily. 60 tablet 5 Past Week at Unknown time    hydrochlorothiazide (HYDRODIURIL) 25 MG tablet Take 1 tablet (25 mg total) by mouth once daily. For Hypertension. 30 tablet 3 Past Week at Unknown time    mesalamine (APRISO) 0.375 gram Cp24 Take 4 capsules (1.5 g total) by mouth once daily. 120 capsule 5 Past Week at Unknown time    metoprolol succinate (TOPROL-XL) 50 MG 24 hr tablet TAKE 1 TABLET BY MOUTH ONCE DAILY 30 tablet 2 Past Week at Unknown time       Physical Exam:    Vital Signs:   Vitals:    08/04/17 0902   BP: (!) 157/89   Pulse: 91   Resp: 18   Temp: 98.6 °F (37 °C)       General Appearance: Well appearing in no acute distress  Lungs: CTA anteriorly  Heart:  Regular rate, S1, S2 normal, no murmurs heard.  Abdomen: Soft, non tender, non distended with normal bowel sounds, no masses  Extremities: No edema    Labs:  Lab Results   Component Value Date    WBC 6.83 06/27/2017    HGB 15.6 06/27/2017    HCT 46.3 06/27/2017     06/27/2017    CHOL 181 09/03/2016    TRIG 117 09/03/2016    HDL 40 09/03/2016    ALT 25 06/27/2017    AST 19 06/27/2017     06/27/2017    K 4.5 06/27/2017     06/27/2017    CREATININE 1.0 06/27/2017    BUN 14 06/27/2017    CO2 21 (L) 06/27/2017    TSH 1.726 09/03/2016    PSA 1.6 09/03/2016    HGBA1C 6.3 (H) 09/03/2016       I have explained the risks and benefits of this endoscopic procedure to the patient including but not limited to bleeding, inflammation, infection, perforation, and death.      Mya Booth MD

## 2017-08-04 NOTE — ANESTHESIA RELEASE NOTE
Post Anesthetic Evaluation    Patient: Jl MAGAÑA Teodora    Procedure(s) Performed: Procedure(s) (LRB):  COLONOSCOPY (N/A)    Anesthesia type: GA    Patient location: PACU    Post pain: Adequate analgesia    Post assessment: no apparent anesthetic complications    Last Vitals:   Vitals:    08/04/17 1112   BP: (!) 149/88   Pulse: 79   Resp: 16   Temp:        Post vital signs: stable    Level of consciousness: awake    Complications: none    Follow-up Needed: No

## 2017-08-04 NOTE — ANESTHESIA POSTPROCEDURE EVALUATION
"Anesthesia Post Evaluation    Patient: Jl Araiza    Procedure(s) Performed: Procedure(s) (LRB):  COLONOSCOPY (N/A)    Final Anesthesia Type: general  Patient location during evaluation: PACU  Patient participation: Yes- Able to Participate  Level of consciousness: awake and alert  Post-procedure vital signs: reviewed and stable  Pain management: adequate  Airway patency: patent  PONV status at discharge: No PONV  Anesthetic complications: no      Cardiovascular status: blood pressure returned to baseline  Respiratory status: spontaneous ventilation and room air  Hydration status: euvolemic  Follow-up not needed.        Visit Vitals  BP (!) 149/88   Pulse 79   Temp 36.7 °C (98 °F)   Resp 16   Ht 5' 9" (1.753 m)   Wt 119.6 kg (263 lb 9.6 oz)   SpO2 98%   BMI 38.93 kg/m²       Pain/Barbra Score: Pain Assessment Performed: Yes (8/4/2017  9:04 AM)  Presence of Pain: denies (8/4/2017 10:53 AM)  Barbra Score: 10 (8/4/2017 10:53 AM)      "

## 2017-08-04 NOTE — PATIENT INSTRUCTIONS
Discharge Summary/Instructions after an Endoscopic Procedure  Patient Name: Jl Robles  Patient MRN: [id number]  Patient YOB: 1970  Friday, August 04, 2017  Mya Booth MD  RESTRICTIONS ON ACTIVITY:  - DO NOT drive a car, operate machinery, make legal/financial decisions, or   drink alcohol until the day after the procedure.    - The following day: return to full activity including work, except no heavy   lifting, straining or running for 3 days if polyps were removed.  - Diet: Eat and drink normally unless instructed otherwise.  TREATMENT FOR COMMON SIDE EFFECTS:  - Mild abdominal pain, bloating or excessive gas: rest, eat lightly and use   a heating pad.  - Sore Throat - treat with throat lozenges. Gargle with warm salt water.  SYMPTOMS TO WATCH FOR AND REPORT TO YOUR PHYSICIAN:  1. Severe abdominal pain or bloating.  2. Pain in chest.  3. Chills or fever occurring within 24 hours after a procedure.  4. A large amount of rectal bleeding, which would show as bright red,   maroon, or black stools. (A small amount of blood from the rectum is not   serious, especially if hemorrhoids are present.)  5. Because air was used during the procedure, expelling large amounts of air   from your rectum or belching is normal.  6. If a bowel prep was taken, you may not have a bowel movement for 1-3   days.  This is normal.  7. Go directly to the emergency room if you notice any of the following:   Chills and/or fever over 101 F   Persistent vomiting   Severe abdominal pain, other than gas cramps   Severe chest pain   Black, tarry stools   Any bleeding - exceeding one tablespoon  Your doctor recommends these additional instructions:  If any biopsies were performed, my office will call you in 5 to 6 business   days with any results.  You are being discharged to home.   You have a contact number available for emergencies.  The signs and symptoms   of potential delayed complications were discussed with you.  You may  return   to normal activities tomorrow.  Written discharge instructions were   provided to you.   Resume your previous diet.   Continue your present medications.   We are waiting for your pathology results.   Your physician has recommended a repeat colonoscopy for surveillance.  The   date of your future colonoscopy will be determined after pathology results   from today's exam become available for review by your physician.   Return to your GI clinic as previously scheduled.  For questions, problems or results please call your physician - Mya Booth MD at Work:  [phone].  [facility], EMERGENCY ROOM PHONE NUMBER: [phone]  IF A COMPLICATION OR EMERGENCY SITUATION ARISES AND YOU ARE UNABLE TO REACH   YOUR PHYSICIAN - GO TO THE EMERGENCY ROOM.  Mya Booth MD  8/4/2017 10:47:32 AM  This report has been verified and signed electronically.

## 2017-08-11 ENCOUNTER — TELEPHONE (OUTPATIENT)
Dept: ENDOSCOPY | Facility: HOSPITAL | Age: 47
End: 2017-08-11

## 2017-08-11 ENCOUNTER — PATIENT OUTREACH (OUTPATIENT)
Dept: OTHER | Facility: OTHER | Age: 47
End: 2017-08-11

## 2017-08-11 NOTE — PROGRESS NOTES
Last 5 Patient Entered Redings Current 30 Day Average: 140/95     Recent Readings 8/10/2017 8/8/2017 8/5/2017 8/4/2017 8/3/2017    Systolic BP (mmHg) 155 151 143 141 126    Diastolic BP (mmHg) 95 98 97 97 93    Pulse 81 70 74 92 70        Follow up with Mr. Jl Araiza completed. Mr. Araiza attributes elevated BP to work related stress. Patient reports that he has about five deadlines coming up for work. He is hoping to see better BP readings once things settle down at work. Mr. Araiza reports that he is continuing to monitor his low sodium diet. Patient did not have any further questions or concerns. I will follow up in a few weeks to see how he is doing and progressing.

## 2017-08-19 ENCOUNTER — PATIENT MESSAGE (OUTPATIENT)
Dept: GASTROENTEROLOGY | Facility: CLINIC | Age: 47
End: 2017-08-19

## 2017-08-24 ENCOUNTER — OFFICE VISIT (OUTPATIENT)
Dept: PSYCHIATRY | Facility: CLINIC | Age: 47
End: 2017-08-24
Payer: COMMERCIAL

## 2017-08-24 DIAGNOSIS — F41.1 GENERALIZED ANXIETY DISORDER: Primary | ICD-10-CM

## 2017-08-24 PROCEDURE — 90834 PSYTX W PT 45 MINUTES: CPT | Mod: S$GLB,,, | Performed by: SOCIAL WORKER

## 2017-08-24 NOTE — PROGRESS NOTES
Individual Psychotherapy (PhD/LCSW)    8/24/2017    Site:  [facility]         Therapeutic Intervention: Met with patient.  Outpatient - Insight oriented psychotherapy 45 min - CPT code 44737    Chief complaint/reason for encounter: depression     Interval history and content of current session: Pt returns to discuss food addiction which he feels he has.  He does not feel guilty about eating and motivation to change behaviors is limited.  He works out of his home and gets bored being home all day, feels he eats out of boredom.  He is addicted to cokes which is one of his pleasures.  He has not been exercising due to his work load and the weather being too hot.  Discussed how he is good at his job ( with a partner in [place]) so that it has become routine and often boring to him.  He feels he needs stimulation and food often provides that for him.  Discussed how his relationship with food is difficult to pin down but we will keep discussing to see if there is a point where he could intervene and make some changes.    Treatment plan:  · Target symptoms: depression  · Why chosen therapy is appropriate versus another modality: relevant to diagnosis  · Outcome monitoring methods: self-report, observation  · Therapeutic intervention type: insight oriented psychotherapy    Risk parameters:  Patient reports no suicidal ideation  Patient reports no homicidal ideation  Patient reports no self-injurious behavior  Patient reports no violent behavior    Verbal deficits: None    Patient's response to intervention:  The patient's response to intervention is motivated.    Progress toward goals and other mental status changes:  The patient's progress toward goals is good.    Diagnosis: 311    Plan:  individual psychotherapy    Return to clinic: 1 week

## 2017-08-30 ENCOUNTER — PATIENT OUTREACH (OUTPATIENT)
Dept: OTHER | Facility: OTHER | Age: 47
End: 2017-08-30

## 2017-08-30 DIAGNOSIS — I10 ESSENTIAL HYPERTENSION: Primary | ICD-10-CM

## 2017-08-30 RX ORDER — CHLORTHALIDONE 25 MG/1
25 TABLET ORAL DAILY
Qty: 30 TABLET | Refills: 1 | Status: SHIPPED | OUTPATIENT
Start: 2017-08-30 | End: 2017-10-19 | Stop reason: ALTCHOICE

## 2017-08-30 NOTE — PROGRESS NOTES
"Mr. Araiza is newly enrolled in [facility]. He has been doing "better" with sodium intake over the last week and a half. Has cut out some fried foods and startrd eating salads. Reports work stress is "better". He believes he has white coat HTN. He verbalizes understanding regarding program requirements and expectations.     Last 5 Patient Entered Redings Current 30 Day Average: 139/94     Recent Readings 8/26/2017 8/20/2017 8/15/2017 8/13/2017 8/10/2017    Systolic BP (mmHg) 134 127 136 135 155    Diastolic BP (mmHg) 95 94 99 88 95    Pulse 77 72 71 81 81          BP above goal  Change HCTZ to chlorthalidone   CPM 9/11 per Dr. Reid  Continue monitoring    Hypertension Medications             chlorthalidone (HYGROTEN) 25 MG Tab Take 1 tablet (25 mg total) by mouth once daily.    metoprolol succinate (TOPROL-XL) 50 MG 24 hr tablet TAKE 1 TABLET BY MOUTH ONCE DAILY          "

## 2017-09-13 ENCOUNTER — PATIENT OUTREACH (OUTPATIENT)
Dept: OTHER | Facility: OTHER | Age: 47
End: 2017-09-13

## 2017-09-13 NOTE — PROGRESS NOTES
Last 5 Patient Entered Redings Current 30 Day Average: 138/97     Recent Readings 9/7/2017 9/6/2017 9/3/2017 8/26/2017 8/20/2017    Systolic BP (mmHg) 146 141 146 134 127    Diastolic BP (mmHg) 102 95 97 95 94    Pulse 74 72 71 77 72        Hypertension Digital Medicine Program (HDMP): Health  Follow Up    Lifestyle Modifications:    1. Low sodium diet: yes Patient reports that he is continuing to make small changes to his diet. He has eliminated more salt and has cut back on cokes.     2.Physical activity: yes Mr. Araiza started taking [employer] classes one day per week. He also reports meditating at home and uses deep breathing techniques at home to help relieve stress.     3.Hypotension/Hypertension symptoms: no   Frequency/Alleviating factors/Precipitating factors, etc.     4. Patient  has been been compliant with the medication regimen     Follow up with Mr. Currieian ARCHANA Araiza completed. Mr. Araiza continues to attribute his elevated BP to work related stress. No further questions or concerns. I will follow up in a few weeks to assess progress.

## 2017-09-21 ENCOUNTER — OFFICE VISIT (OUTPATIENT)
Dept: PSYCHIATRY | Facility: CLINIC | Age: 47
End: 2017-09-21
Payer: COMMERCIAL

## 2017-09-21 DIAGNOSIS — F41.1 GENERALIZED ANXIETY DISORDER: Primary | ICD-10-CM

## 2017-09-21 PROCEDURE — 90834 PSYTX W PT 45 MINUTES: CPT | Mod: S$GLB,,, | Performed by: SOCIAL WORKER

## 2017-09-22 NOTE — PROGRESS NOTES
"Individual Psychotherapy (PhD/LCSW)    9/21/2017    Site:  [facility]         Therapeutic Intervention: Met with patient.  Outpatient - Insight oriented psychotherapy 45 min - CPT code 29399    Chief complaint/reason for encounter: depression     Interval history and content of current session: Pt returns to discuss food addiction which he feels he has.  He has cut out drinking cokes for the past 2 weeks and is now taking an akido class which he enjoys.  Explored his childhood feelings about food (coke especially was a comfort food for him) and how he was "addicted" to marijuana and later alcohol but then was able to quit when he got tired of those things and has had no further problems with substances.  Discussed his perfectionism and obsessiveness about details that serve him well in his job but may not necessarily help him in the rest of his life.    Treatment plan:  · Target symptoms: depression  · Why chosen therapy is appropriate versus another modality: relevant to diagnosis  · Outcome monitoring methods: self-report, observation  · Therapeutic intervention type: insight oriented psychotherapy    Risk parameters:  Patient reports no suicidal ideation  Patient reports no homicidal ideation  Patient reports no self-injurious behavior  Patient reports no violent behavior    Verbal deficits: None    Patient's response to intervention:  The patient's response to intervention is motivated.    Progress toward goals and other mental status changes:  The patient's progress toward goals is good.    Diagnosis: 311    Plan:  individual psychotherapy    Return to clinic: 1 week                                                                                        "

## 2017-09-28 DIAGNOSIS — I10 ESSENTIAL HYPERTENSION: ICD-10-CM

## 2017-09-29 RX ORDER — METOPROLOL SUCCINATE 50 MG/1
TABLET, EXTENDED RELEASE ORAL
Qty: 30 TABLET | Refills: 0 | Status: SHIPPED | OUTPATIENT
Start: 2017-09-29 | End: 2017-10-19

## 2017-10-11 ENCOUNTER — PATIENT OUTREACH (OUTPATIENT)
Dept: OTHER | Facility: OTHER | Age: 47
End: 2017-10-11

## 2017-10-11 NOTE — PROGRESS NOTES
Mr. Araiza is tolerating chlorthalidone without problems. Missed previous lab appt but will draw before seeing Dr. Taylor on Friday. No questions or concerns. Pleased with reading today.     Last 5 Patient Entered Redings Current 30 Day Average: 141/97     Recent Readings 10/11/2017 10/5/2017 9/27/2017 9/26/2017 9/19/2017    Systolic BP (mmHg) 123 152 145 146 137    Diastolic BP (mmHg) 92 99 98 102 91    Pulse 71 75 73 78 79          BP elevated, but trending down?  Continue monitoring    Hypertension Medications             chlorthalidone (HYGROTEN) 25 MG Tab Take 1 tablet (25 mg total) by mouth once daily.    metoprolol succinate (TOPROL-XL) 50 MG 24 hr tablet TAKE 1 TABLET BY MOUTH ONCE DAILY

## 2017-10-12 ENCOUNTER — OFFICE VISIT (OUTPATIENT)
Dept: PSYCHIATRY | Facility: CLINIC | Age: 47
End: 2017-10-12
Payer: COMMERCIAL

## 2017-10-12 DIAGNOSIS — F41.1 GENERALIZED ANXIETY DISORDER: Primary | ICD-10-CM

## 2017-10-12 PROCEDURE — 90834 PSYTX W PT 45 MINUTES: CPT | Mod: S$GLB,,, | Performed by: SOCIAL WORKER

## 2017-10-12 NOTE — PROGRESS NOTES
Individual Psychotherapy (PhD/LCSW)    10/12/2017    Site:  [facility]         Therapeutic Intervention: Met with patient.  Outpatient - Insight oriented psychotherapy 45 min - CPT code 76118    Chief complaint/reason for encounter: depression     Interval history and content of current session: Pt discusses how much stress he has been under with work and daily life.  He feels he has no time to work on exercising or eating well due to lack of time.  He tends to eat whatever he can find when he is stressed by work.  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  He over extends himself out of a need to be compassionate and helpful to others but winds up not taking care of himself in the meantime.  He admits he does not know how to say no and does not feel he can easily begin to do this.    Treatment plan:  · Target symptoms: depression  · Why chosen therapy is appropriate versus another modality: relevant to diagnosis  · Outcome monitoring methods: self-report, observation  · Therapeutic intervention type: insight oriented psychotherapy    Risk parameters:  Patient reports no suicidal ideation  Patient reports no homicidal ideation  Patient reports no self-injurious behavior  Patient reports no violent behavior    Verbal deficits: None    Patient's response to intervention:  The patient's response to intervention is motivated.    Progress toward goals and other mental status changes:  The patient's progress toward goals is good.    Diagnosis: 311    Plan:  individual psychotherapy    Return to clinic: 1 week

## 2017-10-13 ENCOUNTER — OFFICE VISIT (OUTPATIENT)
Dept: INTERNAL MEDICINE | Facility: CLINIC | Age: 47
End: 2017-10-13
Payer: COMMERCIAL

## 2017-10-13 ENCOUNTER — LAB VISIT (OUTPATIENT)
Dept: LAB | Facility: HOSPITAL | Age: 47
End: 2017-10-13
Attending: INTERNAL MEDICINE
Payer: COMMERCIAL

## 2017-10-13 VITALS
SYSTOLIC BLOOD PRESSURE: 146 MMHG | DIASTOLIC BLOOD PRESSURE: 110 MMHG | WEIGHT: 273 LBS | BODY MASS INDEX: 40.43 KG/M2 | HEIGHT: 69 IN

## 2017-10-13 DIAGNOSIS — R73.03 PRE-DIABETES: ICD-10-CM

## 2017-10-13 DIAGNOSIS — I10 ESSENTIAL HYPERTENSION: ICD-10-CM

## 2017-10-13 DIAGNOSIS — Z12.5 PROSTATE CANCER SCREENING: ICD-10-CM

## 2017-10-13 DIAGNOSIS — E66.01 MORBID OBESITY WITH BMI OF 40.0-44.9, ADULT: ICD-10-CM

## 2017-10-13 DIAGNOSIS — I10 ESSENTIAL HYPERTENSION: Primary | ICD-10-CM

## 2017-10-13 LAB
ALBUMIN SERPL BCP-MCNC: 3.8 G/DL
ALP SERPL-CCNC: 79 U/L
ALT SERPL W/O P-5'-P-CCNC: 35 U/L
ANION GAP SERPL CALC-SCNC: 12 MMOL/L
AST SERPL-CCNC: 21 U/L
BILIRUB SERPL-MCNC: 1.1 MG/DL
BUN SERPL-MCNC: 24 MG/DL
CALCIUM SERPL-MCNC: 10 MG/DL
CHLORIDE SERPL-SCNC: 100 MMOL/L
CHOLEST SERPL-MCNC: 203 MG/DL
CHOLEST/HDLC SERPL: 4.5 {RATIO}
CO2 SERPL-SCNC: 29 MMOL/L
COMPLEXED PSA SERPL-MCNC: 1.1 NG/ML
CREAT SERPL-MCNC: 1.2 MG/DL
EST. GFR  (AFRICAN AMERICAN): >60 ML/MIN/1.73 M^2
EST. GFR  (NON AFRICAN AMERICAN): >60 ML/MIN/1.73 M^2
ESTIMATED AVG GLUCOSE: 157 MG/DL
GLUCOSE SERPL-MCNC: 150 MG/DL
HBA1C MFR BLD HPLC: 7.1 %
HDLC SERPL-MCNC: 45 MG/DL
HDLC SERPL: 22.2 %
LDLC SERPL CALC-MCNC: 113.8 MG/DL
NONHDLC SERPL-MCNC: 158 MG/DL
POTASSIUM SERPL-SCNC: 3.5 MMOL/L
PROT SERPL-MCNC: 7.7 G/DL
SODIUM SERPL-SCNC: 141 MMOL/L
TRIGL SERPL-MCNC: 221 MG/DL

## 2017-10-13 PROCEDURE — 84153 ASSAY OF PSA TOTAL: CPT

## 2017-10-13 PROCEDURE — 99999 PR PBB SHADOW E&M-EST. PATIENT-LVL III: CPT | Mod: PBBFAC,,, | Performed by: INTERNAL MEDICINE

## 2017-10-13 PROCEDURE — 80061 LIPID PANEL: CPT

## 2017-10-13 PROCEDURE — 99213 OFFICE O/P EST LOW 20 MIN: CPT | Mod: S$GLB,,, | Performed by: INTERNAL MEDICINE

## 2017-10-13 PROCEDURE — 80053 COMPREHEN METABOLIC PANEL: CPT

## 2017-10-13 PROCEDURE — 36415 COLL VENOUS BLD VENIPUNCTURE: CPT

## 2017-10-13 PROCEDURE — 83036 HEMOGLOBIN GLYCOSYLATED A1C: CPT

## 2017-10-13 RX ORDER — LOSARTAN POTASSIUM 25 MG/1
25 TABLET ORAL DAILY
Qty: 30 TABLET | Refills: 3 | Status: SHIPPED | OUTPATIENT
Start: 2017-10-13 | End: 2018-02-02 | Stop reason: SDUPTHER

## 2017-10-13 NOTE — PROGRESS NOTES
Subjective:       Patient ID: Jl Araiza is a 47 y.o. male.    Chief Complaint: Follow-up    Last seen 3 months ago. Hypertension was not at goal, added HCTZ 25mg to Metoprolol XL 50mg. He enrolled in digital hypertension management, HCTZ was changed to Chlorthalidone 25mg. Labs done this morning are pending. Compliant with meds as prescribed. Home monitoring shows persistent elevation ranging 140-150/. He reports a lot of work related stress that often results in emotional eating of unhealthy foods, but does get healthy meals as much as possible. No new complaints.      PMH:   Hypertension.   Pre-Diabetes, HbA1c 6.3%.  Ulcerative Colitis  Iron Def Anemia  Allergic Rhinitis  Depression/Anxiety  Obesity.   PSA normal 1.6, TSH normal 1.73, CBC, CMP and Lipids normal 9/16. Labs 6/17 by GI include normal CBC, CMP (Glucose 84), Vit D 46, B12 534, Urinalysis clear. BMD normal 6/17.     PSH: Umbilical Hernia Repair. EGD and Colonoscopy 6/16 - repeat scheduled 8/4/17.     Social: Non-smoker, rare alcohol.  with an 9 yo son. .     Hep B vaccine 2015, Prevnar 4/15. Pneumovax 11/15. Tdap 11/15. Flu shot 9/30/17.    NKDA.    Medications: list reviewed and reconciled.                   Review of Systems   Constitutional: Negative for chills, diaphoresis and fever.   Respiratory: Negative for cough and shortness of breath.    Cardiovascular: Negative for chest pain, palpitations and leg swelling.   Neurological: Negative for dizziness, syncope and headaches.       Objective:    /110, Pulse 81, Wt 273 lbs (unchanged)  Physical Exam   Constitutional: He is oriented to person, place, and time. No distress.   HENT:   Nose: Nose normal.   Mouth/Throat: Oropharynx is clear and moist.   Eyes: Conjunctivae are normal. No scleral icterus.   Cardiovascular: Normal rate, regular rhythm and normal heart sounds.    Pulmonary/Chest: Effort normal and breath sounds normal. No respiratory distress. He has  no wheezes. He has no rales.   Musculoskeletal: Normal range of motion. He exhibits no edema.   Neurological: He is alert and oriented to person, place, and time.   Skin: Skin is warm and dry. He is not diaphoretic.   Psychiatric: He has a normal mood and affect. His behavior is normal. Thought content normal.       Assessment:       1. Essential hypertension    2. Pre-diabetes    3. Morbid obesity with BMI of 40.0-44.9, adult        Plan:       Essential hypertension  -     Add Losartan (COZAAR) 25 MG tablet; Take 1 tablet (25 mg total) by mouth once daily. For Blood Pressure.  Dispense: 30 tablet; Refill: 3  If BP responds with titration of this med, may discontinue Metoprolol.    Pre-diabetes        -    Labs done this morning still pending include CMP, Lipids, HbA1c and PSA.    Morbid obesity with BMI of 40.0-44.9, adult        -     Counseled on diet.

## 2017-10-19 ENCOUNTER — PATIENT OUTREACH (OUTPATIENT)
Dept: OTHER | Facility: OTHER | Age: 47
End: 2017-10-19

## 2017-10-19 RX ORDER — HYDROCHLOROTHIAZIDE 25 MG/1
25 TABLET ORAL DAILY
COMMUNITY
End: 2017-12-12 | Stop reason: SDUPTHER

## 2017-10-19 NOTE — PROGRESS NOTES
Mr. Araiza saw Dr. Taylor lat week and she added losartan 25mg. Patient reports he thought losartan would replace toprol and stopped it on 10/14. Patient also reports Dr. Taylor changed diuretic back to HCTZ (chlorthalidone previously ordered by me). Tolerating losartan and HCTZ without problems.     BMP  Lab Results   Component Value Date     10/13/2017    K 3.5 10/13/2017     10/13/2017    CO2 29 10/13/2017    BUN 24 (H) 10/13/2017    CREATININE 1.2 10/13/2017    CALCIUM 10.0 10/13/2017    ANIONGAP 12 10/13/2017    ESTGFRAFRICA >60 10/13/2017    EGFRNONAA >60 10/13/2017         Last 5 Patient Entered Redings Current 30 Day Average: 135/94     Recent Readings 10/16/2017 10/11/2017 10/5/2017 9/27/2017 9/26/2017    Systolic BP (mmHg) 105 123 152 145 146    Diastolic BP (mmHg) 82 92 99 98 102    Pulse 71 71 75 73 78        BP above goal  Changes above noted  Labs stable with prior chlorthalidone dose- K LLN  Continue monitoring to assess if additional therapeutic changes are needed      Hypertension Medications             hydrochlorothiazide (HYDRODIURIL) 25 MG tablet Take 25 mg by mouth once daily.    losartan (COZAAR) 25 MG tablet Take 1 tablet (25 mg total) by mouth once daily. For Blood Pressure.

## 2017-10-22 ENCOUNTER — PATIENT MESSAGE (OUTPATIENT)
Dept: INTERNAL MEDICINE | Facility: CLINIC | Age: 47
End: 2017-10-22

## 2017-10-22 DIAGNOSIS — E11.9 TYPE 2 DIABETES MELLITUS WITHOUT COMPLICATION, WITHOUT LONG-TERM CURRENT USE OF INSULIN: Primary | ICD-10-CM

## 2017-10-22 RX ORDER — METFORMIN HYDROCHLORIDE 500 MG/1
500 TABLET, EXTENDED RELEASE ORAL
Qty: 30 TABLET | Refills: 3 | Status: SHIPPED | OUTPATIENT
Start: 2017-10-22 | End: 2018-02-17 | Stop reason: SDUPTHER

## 2017-10-23 NOTE — TELEPHONE ENCOUNTER
Results posted. Please advise - Metformin ordered at [facility] for new onset Diabetes, and schedule Diabetes Education. Non-fasting labs are to be repeated in 3 months (mid-January).

## 2017-10-24 ENCOUNTER — PATIENT MESSAGE (OUTPATIENT)
Dept: PSYCHIATRY | Facility: CLINIC | Age: 47
End: 2017-10-24

## 2017-10-25 ENCOUNTER — PATIENT MESSAGE (OUTPATIENT)
Dept: OPTOMETRY | Facility: CLINIC | Age: 47
End: 2017-10-25

## 2017-10-26 ENCOUNTER — OFFICE VISIT (OUTPATIENT)
Dept: PSYCHIATRY | Facility: CLINIC | Age: 47
End: 2017-10-26
Payer: COMMERCIAL

## 2017-10-26 DIAGNOSIS — F41.1 GENERALIZED ANXIETY DISORDER: Primary | ICD-10-CM

## 2017-10-26 PROCEDURE — 90834 PSYTX W PT 45 MINUTES: CPT | Mod: S$GLB,,, | Performed by: SOCIAL WORKER

## 2017-10-27 ENCOUNTER — PATIENT OUTREACH (OUTPATIENT)
Dept: OTHER | Facility: OTHER | Age: 47
End: 2017-10-27

## 2017-10-27 NOTE — PROGRESS NOTES
"Last 5 Patient Entered Readings Current 30 Day Average: 129/91     Recent Readings 10/23/2017 10/20/2017 10/16/2017 10/11/2017 10/5/2017    Systolic BP (mmHg) 123 124 105 123 152    Diastolic BP (mmHg) 90 82 82 92 99    Pulse 91 78 71 71 75        Hypertension Digital Medicine Program (HDMP): Health  Follow Up    Lifestyle Modifications:    1.Low sodium diet: yes Patient reports that he is continuing to make changes to his diet. He had a "trending diabetic scare" so he is committed to making and maintaining lifestyle modifications.    2.Physical activity: yes Mr. Araiza has been riding his bike ~7 miles 3-4 days per week. He has also joined the gym.     3.Hypotension/Hypertension symptoms: no   Frequency/Alleviating factors/Precipitating factors, etc.     4.Patient has been compliant with the medication regimen.     Follow up with Mr. Jl Araiza completed. Mr. Araiza is feeling great. Patient reports that he is pleased to see better BP readings. Mr. Araiza also reports less work related stress. He is in good spirits today. No further questions or concerns. I will follow up in a few weeks to assess progress.         "

## 2017-10-28 NOTE — PROGRESS NOTES
"Individual Psychotherapy (PhD/LCSW)    10/26/2017    Site:  [facility]         Therapeutic Intervention: Met with patient.  Outpatient - Insight oriented psychotherapy 45 min - CPT code 72403    Chief complaint/reason for encounter: depression     Interval history and content of current session: Pt states he has been diagnosed with diabetes and this has scared him enough to change his eating patterns and start an exercise program.  He talks about his son and several situations with other parents where he took it upon himself to try to "fix" situations that he really couldn't fix and that consumed his limited time.  Discussed how he has a great deal of empathy for others and tries to help when he sees problems but this drains him of energy that he should use for self care.    Treatment plan:  · Target symptoms: depression  · Why chosen therapy is appropriate versus another modality: relevant to diagnosis  · Outcome monitoring methods: self-report, observation  · Therapeutic intervention type: insight oriented psychotherapy    Risk parameters:  Patient reports no suicidal ideation  Patient reports no homicidal ideation  Patient reports no self-injurious behavior  Patient reports no violent behavior    Verbal deficits: None    Patient's response to intervention:  The patient's response to intervention is motivated.    Progress toward goals and other mental status changes:  The patient's progress toward goals is good.    Diagnosis: 311    Plan:  individual psychotherapy    Return to clinic: 1 week                                                                                    "

## 2017-11-15 ENCOUNTER — CLINICAL SUPPORT (OUTPATIENT)
Dept: DIABETES | Facility: CLINIC | Age: 47
End: 2017-11-15
Payer: COMMERCIAL

## 2017-11-15 DIAGNOSIS — E11.9 DIABETES MELLITUS, NEW ONSET: Primary | ICD-10-CM

## 2017-11-15 DIAGNOSIS — E11.9 TYPE 2 DIABETES MELLITUS WITHOUT COMPLICATION, WITHOUT LONG-TERM CURRENT USE OF INSULIN: ICD-10-CM

## 2017-11-15 PROCEDURE — 99999 PR PBB SHADOW E&M-EST. PATIENT-LVL I: CPT | Mod: PBBFAC,,,

## 2017-11-15 PROCEDURE — G0108 DIAB MANAGE TRN  PER INDIV: HCPCS | Mod: S$GLB,,, | Performed by: INTERNAL MEDICINE

## 2017-11-15 RX ORDER — INSULIN PUMP SYRINGE, 3 ML
EACH MISCELLANEOUS
Qty: 1 EACH | Refills: 0 | Status: SHIPPED | OUTPATIENT
Start: 2017-11-15 | End: 2019-03-14

## 2017-11-15 RX ORDER — LANCETS
1 EACH MISCELLANEOUS DAILY
Qty: 100 EACH | Refills: 3 | Status: SHIPPED | OUTPATIENT
Start: 2017-11-15 | End: 2019-03-14

## 2017-11-15 NOTE — PROGRESS NOTES
Diabetes Education  Author: Saida Basurto RD, CDE  Date: 11/15/2017    Diabetes Education Visit  Diabetes Education Record Assessment/Progress: Initial    Diabetes Type  Diabetes Type : Type II    Diabetes History  Diabetes Diagnosis: 0-1 year    Nutrition  Meal Planning: 3 meals per day, water, eats out often  What type of sweetener do you use?: sugar  What type of beverages do you drink?: regular soda/tea    Monitoring   Monitoring:  (None)    Exercise   Exercise Type: bike riding (8 miles a day and joined a gym - goes 4 days a week for 30 min)    Current Diabetes Treatment   Current Treatment: Oral Medication (Metformin)    Social History  Preferred Learning Method: Face to Face  Primary Support: Self  Smoking Status: Never a Smoker  Alcohol Use: Never    Barriers to Change  Barriers to Change: None  Learning Challenges : None    Readiness to Learn   Readiness to Learn : Acceptance    Cultural Influences  Cultural Influences: No    Diabetes Education Assessment/Progress  Diabetes Disease Process (diabetes disease process and treatment options): Instructed, Discussion, Individual Session, Written Materials Provided  Nutrition (Incorporating nutritional management into one's lifestyle): Instructed, Discussion, Individual Session, Written Materials Provided (Reviewed CHO counting, label reading and addt'l resources to assist w/ CHO counting; plate method discussed as well)  Physical Activity (incorporating physical activity into one's lifestyle): Instructed, Discussion, Individual Session, Written Materials Provided (Reviewed goals and benefits)  Medications (states correct name, dose, onset, peak, duration, side effects & timing of meds): Instructed, Discussion, Individual Session, Written Materials Provided (Reviewed medication regimen)  Monitoring (monitoring blood glucose/other parameters & using results): Instructed, Discussion, Individual Session, Written Materials Provided (Reviewed SMBG schedule and BG  goals)  Acute Complications (preventing, detecting, and treating acute complications): Instructed, Discussion, Individual Session, Written Materials Provided (Reviewed s/s and treatment of hypoglycemia)  Chronic Complications (preventing, detecting, and treating chronic complications): Instructed, Discussion, Individual Session, Written Materials Provided (Reviewed standards of care)  Clinical (diabetes and other pertinent medical history): Instructed, Discussion, Individual Session  Cognitive (knowledge of self-management skills, functional health literacy): Instructed, Discussion, Individual Session  Psychosocial (emotional response to diabetes): Instructed, Discussion, Individual Session (Patient states he is seeing a therapist to address his food addiction)  Diabetes Distress and Support Systems: Not Covered/Deferred (Time constraints)  Behavioral (readiness for change, lifestyle practices, self-care behaviors): Instructed, Discussion, Individual Session    Goals  Patient has selected/evaluated goals during today's session: Yes, selected  Monitoring: Set (Check BG once a day)    Diabetes Care Plan/Intervention  Education Plan/Intervention: Individual Follow-Up DSMT    Diabetes Meal Plan  Carbohydrate Per Meal: 45-60g  Carbohydrate Per Snack : 7-15g    Education Units of Time   Time Spent: 45 min      Health Maintenance Topics with due status: Not Due       Topic Last Completion Date    TETANUS VACCINE 11/09/2015    Pneumococcal PPSV23 (Medium Risk) 11/09/2015    Lipid Panel 10/13/2017    Hemoglobin A1c 10/13/2017     Health Maintenance Due   Topic Date Due    Foot Exam  03/01/1979    Eye Exam  12/15/2015

## 2017-11-20 ENCOUNTER — OFFICE VISIT (OUTPATIENT)
Dept: OPTOMETRY | Facility: CLINIC | Age: 47
End: 2017-11-20
Payer: COMMERCIAL

## 2017-11-20 DIAGNOSIS — E11.9 TYPE 2 DIABETES MELLITUS WITHOUT RETINOPATHY: ICD-10-CM

## 2017-11-20 DIAGNOSIS — Z01.00 DIABETIC EYE EXAM: Primary | ICD-10-CM

## 2017-11-20 DIAGNOSIS — H52.4 PRESBYOPIA OF BOTH EYES: ICD-10-CM

## 2017-11-20 DIAGNOSIS — E11.9 DIABETIC EYE EXAM: Primary | ICD-10-CM

## 2017-11-20 DIAGNOSIS — Z13.5 SCREENING FOR EYE CONDITION: ICD-10-CM

## 2017-11-20 DIAGNOSIS — H52.223 REGULAR ASTIGMATISM OF BOTH EYES: ICD-10-CM

## 2017-11-20 DIAGNOSIS — I10 ESSENTIAL HYPERTENSION: ICD-10-CM

## 2017-11-20 PROCEDURE — 99999 PR PBB SHADOW E&M-EST. PATIENT-LVL II: CPT | Mod: PBBFAC,,, | Performed by: OPTOMETRIST

## 2017-11-20 PROCEDURE — 92014 COMPRE OPH EXAM EST PT 1/>: CPT | Mod: S$GLB,,, | Performed by: OPTOMETRIST

## 2017-11-20 PROCEDURE — 92015 DETERMINE REFRACTIVE STATE: CPT | Mod: S$GLB,,, | Performed by: OPTOMETRIST

## 2017-11-20 NOTE — PATIENT INSTRUCTIONS
Good ocular health in both eyes.  No evidence of diabetic retinal changes in either eye.    Astigmatic refractive error in each eye, with good correctable visual acuity in each eye.  Presbyopia consistent with age.  New spectacle lens Rx issued for use as desired.   Recommend full-time wear.  Repeat general eye examination and refraction in one year.

## 2017-11-20 NOTE — PROGRESS NOTES
"HPI     Diabetic Eye Exam    Additional comments: Diabetic eye examination and refraction.            Comments   Patient's age: 47 y.o. WM  Occupation:   Approximate date of last eye examination:  12/15/2014  Name of last eye doctor seen:  Dr Fine  City/State:  [facility]  Wears glasses? Yes     If yes, wears  full time  Present glasses are bifocal / S V Distance / S V Reading: Progressive   Approximate age of present glasses:  3+ years   Got new glasses following last exam, or subsequently?:  No   Any problem with VA with glasses?  No  Wears CLs?:  No  Headaches? No  Eye pain/discomfort?  No                                                                                       Flashes?  No  Floaters?  No  Diplopia/Double vision?  No  Patient's Ocular History:         Any eye surgeries? No         Any eye injury?  No         Any treatment for eye disease?  No  Family history of eye disease?  No  Significant patient medical history:         1. Diabetes?  Yes        If yes, IDDM or NIDDM? NA   2. HBP?  No              3. Other (describe):  None   ! OTC eyedrops currently using:  No   ! Prescription eye meds currently using:  No   ! Any history of allergy/adverse reaction to any eye meds used   previously?  No    ! Any history of allergy/adverse reaction to eyedrops used during prior   eye exam(s)? No    ! Any history of allergy/adverse reaction to Novacaine or similar meds?   No   ! Any history of allergy/adverse reaction to Epinephrine or similar meds?   No    ! Patient okay with use of anesthetic eyedrops to check eye pressure?    Yes        ! Patient okay with use of eyedrops to dilate pupils today?  Yes    !  Allergies/Medications/Medical History/Family History reviewed today?    Yes        PD =   72/68  Desired reading distance =  16"  Computer distance = 23"                                                                    Last edited by Noman Fine, OD on 11/20/2017  1:20 PM. (History)    "         Assessment /Plan     For exam results, see Encounter Report.    1. Diabetic eye exam     2. Type 2 diabetes mellitus without retinopathy     3. Regular astigmatism of both eyes     4. Presbyopia of both eyes     5. Essential hypertension     6. Screening for eye condition                  Good ocular health in both eyes.  No evidence of diabetic retinal changes in either eye.    Astigmatic refractive error in each eye, with good correctable visual acuity in each eye.  Presbyopia consistent with age.  New spectacle lens Rx issued for use as desired.   Recommend full-time wear.  Repeat general eye examination and refraction in one year.

## 2017-11-27 ENCOUNTER — PATIENT OUTREACH (OUTPATIENT)
Dept: OTHER | Facility: OTHER | Age: 47
End: 2017-11-27

## 2017-11-27 NOTE — PROGRESS NOTES
Last 5 Patient Entered Readings Current 30 Day Average: 136/89     Recent Readings 11/26/2017 11/17/2017 11/16/2017 11/16/2017 11/15/2017    Systolic BP (mmHg) 126 143 145 143 134    Diastolic BP (mmHg) 83 93 91 92 86    Pulse 74 67 71 70 80        Hypertension Digital Medicine Program (HDMP): Health  Follow Up    Lifestyle Modifications:    1.Low sodium diet: yes Mr. Araiza is continuing to make changes to his diet. Patient states that he was even able to lose some weight over the Thanksgiving holiday.    2.Physical activity: yes Patient states that he has been riding his bike for about 8 miles every day.     3.Hypotension/Hypertension symptoms: no   Frequency/Alleviating factors/Precipitating factors, etc.     4.Patient has been compliant with the medication regimen.     Follow up with Delfin Jl ARCHANA Araiza completed. Mr. Araiza is doing well. No further questions or concerns. I will follow up in a few weeks to assess progress.         
Alert-The patient is alert, awake and responds to voice. The patient is oriented to time, place, and person. The triage nurse is able to obtain subjective information.

## 2017-12-01 ENCOUNTER — OFFICE VISIT (OUTPATIENT)
Dept: PSYCHIATRY | Facility: CLINIC | Age: 47
End: 2017-12-01
Payer: COMMERCIAL

## 2017-12-01 DIAGNOSIS — F41.1 GENERALIZED ANXIETY DISORDER: Primary | ICD-10-CM

## 2017-12-01 PROCEDURE — 90834 PSYTX W PT 45 MINUTES: CPT | Mod: S$GLB,,, | Performed by: SOCIAL WORKER

## 2017-12-02 NOTE — PROGRESS NOTES
Individual Psychotherapy (PhD/LCSW)    12/1/2017    Site:  [facility]         Therapeutic Intervention: Met with patient.  Outpatient - Insight oriented psychotherapy 45 min - CPT code 35688    Chief complaint/reason for encounter: depression     Interval history and content of current session: Pt reports he has stayed off cola drinks for the past 6 weeks and has been riding his bike about 8 miles a day.  His blood sugar level is doing well.  He has committed himself to continuing on his healthy habits and making the changes to be healthier.  Discussed how he has begun to let go of the guilt he felt about taking time for himself and about confronting his wife on her comments that irritate him.  Discussed the usefulness of a mindfulness practice for his new focus on himself and referred him to an 8 week mindfulness based stress reduction course that will start in January.    Treatment plan:  · Target symptoms: depression  · Why chosen therapy is appropriate versus another modality: relevant to diagnosis  · Outcome monitoring methods: self-report, observation  · Therapeutic intervention type: insight oriented psychotherapy    Risk parameters:  Patient reports no suicidal ideation  Patient reports no homicidal ideation  Patient reports no self-injurious behavior  Patient reports no violent behavior    Verbal deficits: None    Patient's response to intervention:  The patient's response to intervention is motivated.    Progress toward goals and other mental status changes:  The patient's progress toward goals is good.    Diagnosis: 311    Plan:  individual psychotherapy    Return to clinic: 1 week

## 2017-12-06 DIAGNOSIS — F41.1 GAD (GENERALIZED ANXIETY DISORDER): ICD-10-CM

## 2017-12-06 DIAGNOSIS — I10 ESSENTIAL HYPERTENSION: ICD-10-CM

## 2017-12-06 RX ORDER — BUSPIRONE HYDROCHLORIDE 15 MG/1
TABLET ORAL
Qty: 60 TABLET | Refills: 0 | Status: SHIPPED | OUTPATIENT
Start: 2017-12-06 | End: 2018-01-04 | Stop reason: SDUPTHER

## 2017-12-06 RX ORDER — HYDROCHLOROTHIAZIDE 25 MG/1
TABLET ORAL
Qty: 30 TABLET | Refills: 0 | Status: SHIPPED | OUTPATIENT
Start: 2017-12-06 | End: 2018-01-04 | Stop reason: SDUPTHER

## 2017-12-12 ENCOUNTER — PATIENT OUTREACH (OUTPATIENT)
Dept: OTHER | Facility: OTHER | Age: 47
End: 2017-12-12

## 2017-12-12 NOTE — PROGRESS NOTES
"Mr. Araiza reports he started exercise regimen shortly after having met with diabetes educator. Has been riding his bicycle for 7 miles every day. He is now "5 holes down" on his belt. Feels like he has more energy. Has also improved his diet. Reviewed new HTN guidelines, including lower BP goal <130/80 mmHg. Patient verbalizes understanding.       Last 5 Patient Entered Readings Current 30 Day Average: 136/90     Recent Readings 12/7/2017 11/26/2017 11/17/2017 11/16/2017 11/16/2017    Systolic BP (mmHg) 122 126 143 145 143    Diastolic BP (mmHg) 87 83 93 91 92    Pulse 71 74 67 71 70          BP above goal, trending down with lifestyle modifications  Continue monitoring    Hypertension Medications             hydrochlorothiazide (HYDRODIURIL) 25 MG tablet Take 25 mg by mouth once daily.    hydroCHLOROthiazide (HYDRODIURIL) 25 MG tablet TAKE 1 TABLET(25 MG) BY MOUTH EVERY DAY FOR HIGH BLOOD PRESSURE    losartan (COZAAR) 25 MG tablet Take 1 tablet (25 mg total) by mouth once daily. For Blood Pressure.          "

## 2017-12-27 ENCOUNTER — PATIENT OUTREACH (OUTPATIENT)
Dept: OTHER | Facility: OTHER | Age: 47
End: 2017-12-27

## 2017-12-27 NOTE — PROGRESS NOTES
Last 5 Patient Entered Readings Current 30 Day Average: 130/89     Recent Readings 12/22/2017 12/19/2017 12/15/2017 12/13/2017 12/7/2017    SBP (mmHg) 124 133 129 142 122    DBP (mmHg) 88 85 87 96 87    Pulse 69 71 72 71 71        Hypertension Digital Medicine Program (HDMP): Health  Follow Up    Lifestyle Modifications:    1.Low sodium diet: yes Mr. Araiza is maintaining changes made to his diet and weight loss.    2.Physical activity: yes Patient denies any changes to physical activity.    3.Hypotension/Hypertension symptoms: no   Frequency/Alleviating factors/Precipitating factors, etc.     4.Patient has been compliant with the medication regimen.     Follow up with Mr. Jl Araiza completed. Mr. Araiza is doing well. Patient was busy and unable to speak. However, he denies any changes to diet and physical activity. He appreciates the call and encouragement. No further questions or concerns. I will follow up in a few weeks to assess progress.

## 2018-01-03 DIAGNOSIS — K51.90 ULCERATIVE COLITIS WITHOUT COMPLICATIONS, UNSPECIFIED LOCATION: ICD-10-CM

## 2018-01-03 DIAGNOSIS — Z79.899 ENCOUNTER FOR LONG-TERM (CURRENT) USE OF MEDICATIONS: ICD-10-CM

## 2018-01-03 DIAGNOSIS — D50.9 MICROCYTIC ANEMIA: ICD-10-CM

## 2018-01-03 DIAGNOSIS — K52.9 IBD (INFLAMMATORY BOWEL DISEASE): ICD-10-CM

## 2018-01-03 DIAGNOSIS — K92.1 BLOOD IN STOOL: ICD-10-CM

## 2018-01-03 DIAGNOSIS — K52.9 COLITIS: ICD-10-CM

## 2018-01-03 RX ORDER — MESALAMINE 375 MG/1
CAPSULE, EXTENDED RELEASE ORAL
Qty: 120 CAPSULE | Refills: 0 | Status: SHIPPED | OUTPATIENT
Start: 2018-01-03 | End: 2018-02-02 | Stop reason: SDUPTHER

## 2018-01-04 DIAGNOSIS — F41.1 GAD (GENERALIZED ANXIETY DISORDER): ICD-10-CM

## 2018-01-04 DIAGNOSIS — I10 ESSENTIAL HYPERTENSION: ICD-10-CM

## 2018-01-04 RX ORDER — BUSPIRONE HYDROCHLORIDE 15 MG/1
TABLET ORAL
Qty: 60 TABLET | Refills: 5 | Status: SHIPPED | OUTPATIENT
Start: 2018-01-04 | End: 2018-06-18 | Stop reason: SDUPTHER

## 2018-01-04 RX ORDER — HYDROCHLOROTHIAZIDE 25 MG/1
TABLET ORAL
Qty: 30 TABLET | Refills: 5 | Status: SHIPPED | OUTPATIENT
Start: 2018-01-04 | End: 2018-06-18 | Stop reason: SDUPTHER

## 2018-01-25 ENCOUNTER — PATIENT OUTREACH (OUTPATIENT)
Dept: OTHER | Facility: OTHER | Age: 48
End: 2018-01-25

## 2018-01-25 ENCOUNTER — LAB VISIT (OUTPATIENT)
Dept: LAB | Facility: HOSPITAL | Age: 48
End: 2018-01-25
Attending: INTERNAL MEDICINE
Payer: COMMERCIAL

## 2018-01-25 DIAGNOSIS — E11.9 TYPE 2 DIABETES MELLITUS WITHOUT COMPLICATION, WITHOUT LONG-TERM CURRENT USE OF INSULIN: ICD-10-CM

## 2018-01-25 LAB
ANION GAP SERPL CALC-SCNC: 9 MMOL/L
BUN SERPL-MCNC: 18 MG/DL
CALCIUM SERPL-MCNC: 9.9 MG/DL
CHLORIDE SERPL-SCNC: 103 MMOL/L
CO2 SERPL-SCNC: 27 MMOL/L
CREAT SERPL-MCNC: 1.2 MG/DL
EST. GFR  (AFRICAN AMERICAN): >60 ML/MIN/1.73 M^2
EST. GFR  (NON AFRICAN AMERICAN): >60 ML/MIN/1.73 M^2
ESTIMATED AVG GLUCOSE: 126 MG/DL
GLUCOSE SERPL-MCNC: 111 MG/DL
HBA1C MFR BLD HPLC: 6 %
POTASSIUM SERPL-SCNC: 4.3 MMOL/L
SODIUM SERPL-SCNC: 139 MMOL/L

## 2018-01-25 PROCEDURE — 80048 BASIC METABOLIC PNL TOTAL CA: CPT

## 2018-01-25 PROCEDURE — 36415 COLL VENOUS BLD VENIPUNCTURE: CPT

## 2018-01-25 PROCEDURE — 83036 HEMOGLOBIN GLYCOSYLATED A1C: CPT

## 2018-01-25 NOTE — PROGRESS NOTES
Last 5 Patient Entered Readings                                      Current 30 Day Average: 133/90     Recent Readings 1/11/2018 1/7/2018 1/3/2018 12/22/2017 12/19/2017    SBP (mmHg) 124 140 135 124 133    DBP (mmHg) 88 91 91 88 85    Pulse 102 65 72 69 71        Hypertension Digital Medicine Program (HDMP): Health  Follow Up    Lifestyle Modifications:    1.Low sodium diet: yes Patient denies any changes to his diet.    2.Physical activity: yes Mr. Araiza is continuing to ride his bike most days of the week, even in cooler weather. He usually rides around lunch time.     3.Hypotension/Hypertension symptoms: no   Frequency/Alleviating factors/Precipitating factors, etc.     4.Patient has been compliant with the medication regimen.     Follow up with Mr. lJ Araiza completed. Mr. Araiza is doing well. He will submit a BP reading today. No further questions or concerns. I will follow up in a few weeks to assess progress.

## 2018-02-02 DIAGNOSIS — I10 ESSENTIAL HYPERTENSION: ICD-10-CM

## 2018-02-02 DIAGNOSIS — K51.90 ULCERATIVE COLITIS WITHOUT COMPLICATIONS, UNSPECIFIED LOCATION: ICD-10-CM

## 2018-02-02 DIAGNOSIS — Z79.899 ENCOUNTER FOR LONG-TERM (CURRENT) USE OF MEDICATIONS: ICD-10-CM

## 2018-02-02 DIAGNOSIS — K52.9 COLITIS: ICD-10-CM

## 2018-02-02 DIAGNOSIS — K52.9 IBD (INFLAMMATORY BOWEL DISEASE): ICD-10-CM

## 2018-02-02 DIAGNOSIS — K92.1 BLOOD IN STOOL: ICD-10-CM

## 2018-02-02 DIAGNOSIS — D50.9 MICROCYTIC ANEMIA: ICD-10-CM

## 2018-02-02 RX ORDER — MESALAMINE 375 MG/1
CAPSULE, EXTENDED RELEASE ORAL
Qty: 120 CAPSULE | Refills: 0 | Status: SHIPPED | OUTPATIENT
Start: 2018-02-02 | End: 2018-03-04 | Stop reason: SDUPTHER

## 2018-02-02 RX ORDER — LOSARTAN POTASSIUM 25 MG/1
TABLET ORAL
Qty: 30 TABLET | Refills: 3 | Status: SHIPPED | OUTPATIENT
Start: 2018-02-02 | End: 2018-06-03 | Stop reason: SDUPTHER

## 2018-02-17 DIAGNOSIS — E11.9 TYPE 2 DIABETES MELLITUS WITHOUT COMPLICATION, WITHOUT LONG-TERM CURRENT USE OF INSULIN: ICD-10-CM

## 2018-02-17 RX ORDER — METFORMIN HYDROCHLORIDE 500 MG/1
TABLET, EXTENDED RELEASE ORAL
Qty: 30 TABLET | Refills: 5 | Status: SHIPPED | OUTPATIENT
Start: 2018-02-17 | End: 2018-12-14 | Stop reason: DRUGHIGH

## 2018-02-18 ENCOUNTER — PATIENT MESSAGE (OUTPATIENT)
Dept: INTERNAL MEDICINE | Facility: CLINIC | Age: 48
End: 2018-02-18

## 2018-02-21 ENCOUNTER — OFFICE VISIT (OUTPATIENT)
Dept: INTERNAL MEDICINE | Facility: CLINIC | Age: 48
End: 2018-02-21
Payer: COMMERCIAL

## 2018-02-21 VITALS
WEIGHT: 271.19 LBS | HEIGHT: 69 IN | HEART RATE: 81 BPM | SYSTOLIC BLOOD PRESSURE: 128 MMHG | BODY MASS INDEX: 40.17 KG/M2 | DIASTOLIC BLOOD PRESSURE: 80 MMHG

## 2018-02-21 DIAGNOSIS — E66.01 MORBID OBESITY WITH BMI OF 40.0-44.9, ADULT: ICD-10-CM

## 2018-02-21 DIAGNOSIS — E11.9 TYPE 2 DIABETES MELLITUS WITHOUT COMPLICATION, WITHOUT LONG-TERM CURRENT USE OF INSULIN: Primary | ICD-10-CM

## 2018-02-21 DIAGNOSIS — I10 ESSENTIAL HYPERTENSION: ICD-10-CM

## 2018-02-21 DIAGNOSIS — F41.1 GAD (GENERALIZED ANXIETY DISORDER): ICD-10-CM

## 2018-02-21 PROCEDURE — 99999 PR PBB SHADOW E&M-EST. PATIENT-LVL III: CPT | Mod: PBBFAC,,, | Performed by: INTERNAL MEDICINE

## 2018-02-21 PROCEDURE — 99214 OFFICE O/P EST MOD 30 MIN: CPT | Mod: S$GLB,,, | Performed by: INTERNAL MEDICINE

## 2018-02-21 PROCEDURE — 3008F BODY MASS INDEX DOCD: CPT | Mod: S$GLB,,, | Performed by: INTERNAL MEDICINE

## 2018-02-21 RX ORDER — LANCETS 28 GAUGE
EACH MISCELLANEOUS
Refills: 3 | COMMUNITY
Start: 2017-11-17 | End: 2019-07-24

## 2018-02-21 NOTE — PROGRESS NOTES
Subjective:       Patient ID: Jl Araiza is a 47 y.o. male.    Chief Complaint: Hypertension and Diabetes    Last seen 4 months ago. Hypertension was not controlled on HCTZ and Metoprolol, and labs indicated pre-diabetes had advanced to Diabetes Type 2 requiring treatment. Added Losartan 25mg daily (Metoprolol discontinued) and Metformin ER 500mg daily, and referred him for Diabetes Education. He is compliant with all recommendations and returns for follow up having made significant changes in his diet and activity level. Not much weight loss yet, but labs and blood pressure are definitely improved. He is committed to improving his health. Cholesterol is near goal, statin not added yet, and ASA not added due to Colitis.     PMH:   Hypertension.   Diabetes type 2 diagnosed 10/17, HbA1c 7.1%.  Ulcerative Colitis  Iron Def Anemia  Allergic Rhinitis  Depression/Anxiety  Obesity.   Eye exam 11/17. PSA normal 1.1 Oct. '17. BMD normal 6/17. Labs 10/17: CBC normal, CMP normal except Fasting Glucose 150, TChol 203, , HDL 45, .     PSH: Umbilical Hernia Repair. EGD 6/16. Colonoscopy 8/17.     Social: Non-smoker, rare alcohol.  with an 9 yo son. .     Hep B vaccine 2015, Prevnar 4/15. Pneumovax 11/15. Tdap 11/15. Flu shot 9/30/17.    NKDA.    Medications: list reviewed and reconciled.                   Review of Systems   Constitutional: Negative for activity change, diaphoresis, fatigue and unexpected weight change.   HENT: Negative for hearing loss, rhinorrhea and trouble swallowing.    Eyes: Negative for discharge and visual disturbance.   Respiratory: Negative for cough, chest tightness, shortness of breath and wheezing.    Cardiovascular: Negative for chest pain and palpitations.   Gastrointestinal: Negative for abdominal pain, blood in stool, constipation, diarrhea and vomiting.   Endocrine: Negative for polydipsia and polyuria.   Genitourinary: Negative for difficulty urinating,  "frequency, hematuria and urgency.   Musculoskeletal: Negative for arthralgias, joint swelling and neck pain.   Skin: Negative for rash and wound.   Neurological: Negative for weakness and headaches.   Psychiatric/Behavioral: Negative for confusion and dysphoric mood.        Anxiety is stable on Buspar.        Objective:    /80, Pulse 81, Ht 5' 9", Wt 271 lbs (from 273)  Physical Exam   Constitutional: He is oriented to person, place, and time. He appears well-developed and well-nourished. No distress.   HENT:   Nose: Nose normal.   Mouth/Throat: Oropharynx is clear and moist.   Eyes: Conjunctivae and EOM are normal.   Neck: Normal range of motion. Neck supple. No JVD present.   Cardiovascular: Normal rate, regular rhythm and normal heart sounds.    Pulmonary/Chest: Effort normal and breath sounds normal. No respiratory distress. He has no wheezes. He has no rales.   Musculoskeletal: Normal range of motion. He exhibits no edema.   Protective Sensation (w/ 10 gram monofilament):  Right: Intact  Left: Intact    Visual Inspection:  Normal -  Bilateral    Pedal Pulses:   Right: Present  Left: Present    Posterior tibialis:   Right:Present  Left: Present     Neurological: He is alert and oriented to person, place, and time. No cranial nerve deficit.   Skin: Skin is warm and dry. He is not diaphoretic.   Psychiatric: He has a normal mood and affect. His behavior is normal. Judgment and thought content normal.       1/25/18: BMP normal, Glucose 111, HbA1c 6.0%.     Assessment:       1. Type 2 diabetes mellitus without complication, without long-term current use of insulin    2. Essential hypertension    3. MYAH (generalized anxiety disorder)    4. Morbid obesity with BMI of 40.0-44.9, adult        Plan:       Type 2 diabetes mellitus without complication, without long-term current use of insulin - controlled, continue Metformin, diet and exercise.     Essential hypertension controlled, continue current meds.     MYAH " (generalized anxiety disorder) - stable on Buspar.     Morbid obesity with BMI of 40.0-44.9, adult - continue diet, eliminate fast food, exercise regularly.

## 2018-02-23 ENCOUNTER — PATIENT OUTREACH (OUTPATIENT)
Dept: OTHER | Facility: OTHER | Age: 48
End: 2018-02-23

## 2018-02-23 NOTE — PROGRESS NOTES
"Last 5 Patient Entered Readings                                      Current 30 Day Average: 125/94     Recent Readings 2/21/2018 2/1/2018 1/11/2018 1/7/2018 1/3/2018    SBP (mmHg) 122 128 124 140 135    DBP (mmHg) 90 97 88 91 91    Pulse 67 84 102 65 72        Digital Medicine: Health  Follow Up    Lifestyle Modifications:    1.Dietary Modifications (Sodium intake <2,000mg/day, food labels, dining out): Patient reports that he is maintaining his diet. His weight remains the same.    2.Physical Activity: Mr. Araiza reports that his "activity has slowed". He attributes this to a work deadline. Mr. Araiza hopes to resume regular cycling once his deadline is met. He also states that he will be able to monitor his BP more frequently afterwards.    3.Medication Therapy: Patient has been compliant with the medication regimen.    4.Patient has the following medication side effects/concerns:   (Frequency/Alleviating factors/Precipitating factors, etc.)     Follow up with Mr. Jl MAGAÑA Araiza completed. Mr. rAaiza is doing okay. Patient saw Dr. Taylor on 2/21, and reports "everything looks good". His BP in the clinic was 128/80 mmHg. No further questions or concerns. I will follow up in a few weeks to assess progress.      "

## 2018-03-04 DIAGNOSIS — K52.9 IBD (INFLAMMATORY BOWEL DISEASE): ICD-10-CM

## 2018-03-04 DIAGNOSIS — Z79.899 ENCOUNTER FOR LONG-TERM (CURRENT) USE OF MEDICATIONS: ICD-10-CM

## 2018-03-04 DIAGNOSIS — K92.1 BLOOD IN STOOL: ICD-10-CM

## 2018-03-04 DIAGNOSIS — D50.9 MICROCYTIC ANEMIA: ICD-10-CM

## 2018-03-04 DIAGNOSIS — K52.9 COLITIS: ICD-10-CM

## 2018-03-04 DIAGNOSIS — K51.90 ULCERATIVE COLITIS WITHOUT COMPLICATIONS, UNSPECIFIED LOCATION: ICD-10-CM

## 2018-03-04 RX ORDER — MESALAMINE 375 MG/1
CAPSULE, EXTENDED RELEASE ORAL
Qty: 120 CAPSULE | Refills: 0 | Status: SHIPPED | OUTPATIENT
Start: 2018-03-04 | End: 2018-04-06 | Stop reason: SDUPTHER

## 2018-03-08 ENCOUNTER — PATIENT MESSAGE (OUTPATIENT)
Dept: INTERNAL MEDICINE | Facility: CLINIC | Age: 48
End: 2018-03-08

## 2018-03-27 ENCOUNTER — PATIENT OUTREACH (OUTPATIENT)
Dept: OTHER | Facility: OTHER | Age: 48
End: 2018-03-27

## 2018-03-27 NOTE — LETTER
Katie Lovett  [address] [place] [address]     Dear Jl,    Thank you for enrolling in the [facility] Digital [facility] Program. To participate in our program, we ask that you submit a blood pressure reading at least once weekly through your [facility] Account and maintain regular contact with your Care Team.  We have not received any data or heard from you in some time.     The Digital Medicine Care Team has attempted to reach you on multiple occasions to determine if you would like to continue participating in the program. While we encourage you to continue participating fully, we understand that circumstances may change.      To continue participating in the program, please contact me at [phone]. If we do not hear back, you will be un-enrolled and your physician will be notified of your decision.    If you have submitted blood pressure readings during the past 112 days and believe you are receiving this letter in error, please call the Digital Medicine Patient Support Line at [phone] for troubleshooting.      We look forward to hearing from you soon.    Sincerely,     Katie Lovett MA, CHES  Your Personal Health                                                                                                                         Jl Araiza  [address] [place] [id number]

## 2018-03-27 NOTE — LETTER
Kiana Jeff, PharmD  [address] [place] [address]     Dear Jl,    We have made several attempts to encourage your participation in Digital Medicine monitoring. Unfortunately, we have been unsuccessful and this is an official notice that you are no longer enrolled in Hypertension Digital Medicine Program, and thus, we will no longer be managing your hypertension.    Please note this has no impact on your relationship with [facility] or your providers. Going forward, please reach out to your primary care provider with any questions or concerns regarding your health.                         Please contact [phone] if you have any additional questions.     Sincerely,    The [facility] Digital [facility] Team                                                                                                                                            Jl Araiza  [address] [place] [address]

## 2018-03-27 NOTE — LETTER
Katie Lovett  [address] [place] [address]     Dear Jl,    Thank you for enrolling in the [facility] Program. To participate in our program, we ask that you submit a blood pressure reading at least once weekly through your [facility] Account and maintain regular contact with your Care Team.  We have not received any data or heard from you in some time.     The Digital Medicine Care Team has attempted to reach you on multiple occasions to determine if you would like to continue participating in the program. While we encourage you to continue participating fully, we understand that circumstances may change.      To continue participating in the program, please contact me at [phone]. If we do not hear back, you will be un-enrolled and your physician will be notified of your decision.    If you have submitted blood pressure readings during the past 90 days and believe you are receiving this letter in error, please call the Digital Medicine Patient Support Line at [phone] for troubleshooting.      We look forward to hearing from you soon.    Sincerely,     Katie Lovett MA, CHES  Your Personal Health                                                                                                                         Jl Araiza  [address] [place] [address]

## 2018-03-27 NOTE — PROGRESS NOTES
Last 5 Patient Entered Readings                                      Current 30 Day Average:      Recent Readings 2/21/2018 2/1/2018 1/11/2018 1/7/2018 1/3/2018    SBP (mmHg) 122 128 124 140 135    DBP (mmHg) 90 97 88 91 91    Pulse 67 84 102 65 72        4/04 - LVM X2. Sending MyChart message today as well.   4/18 - LVM X3. Patient did not read [employer] message.   4/25 - LVM X4.  5/08 - LVM X5. Will send noncompliance letter at next outreach.   5/22 - LVM X6. Sending noncompliance letter today. Will notify enrolling physician to encourage participation and follow up in 2 weeks.

## 2018-04-03 DIAGNOSIS — K52.9 IBD (INFLAMMATORY BOWEL DISEASE): ICD-10-CM

## 2018-04-03 DIAGNOSIS — K52.9 COLITIS: ICD-10-CM

## 2018-04-03 DIAGNOSIS — D50.9 MICROCYTIC ANEMIA: ICD-10-CM

## 2018-04-03 DIAGNOSIS — K51.90 ULCERATIVE COLITIS WITHOUT COMPLICATIONS, UNSPECIFIED LOCATION: ICD-10-CM

## 2018-04-03 DIAGNOSIS — K92.1 BLOOD IN STOOL: ICD-10-CM

## 2018-04-03 DIAGNOSIS — Z79.899 ENCOUNTER FOR LONG-TERM (CURRENT) USE OF MEDICATIONS: ICD-10-CM

## 2018-04-03 RX ORDER — MESALAMINE 375 MG/1
CAPSULE, EXTENDED RELEASE ORAL
Qty: 120 CAPSULE | Refills: 0 | Status: CANCELLED | OUTPATIENT
Start: 2018-04-03

## 2018-04-06 DIAGNOSIS — K52.9 COLITIS: ICD-10-CM

## 2018-04-06 DIAGNOSIS — D50.9 MICROCYTIC ANEMIA: ICD-10-CM

## 2018-04-06 DIAGNOSIS — K52.9 IBD (INFLAMMATORY BOWEL DISEASE): ICD-10-CM

## 2018-04-06 DIAGNOSIS — K51.90 ULCERATIVE COLITIS WITHOUT COMPLICATIONS, UNSPECIFIED LOCATION: ICD-10-CM

## 2018-04-06 DIAGNOSIS — Z79.899 ENCOUNTER FOR LONG-TERM (CURRENT) USE OF MEDICATIONS: ICD-10-CM

## 2018-04-06 DIAGNOSIS — K92.1 BLOOD IN STOOL: ICD-10-CM

## 2018-04-09 RX ORDER — MESALAMINE 0.38 G/1
1.5 CAPSULE, EXTENDED RELEASE ORAL DAILY
Qty: 120 CAPSULE | Refills: 0 | Status: SHIPPED | OUTPATIENT
Start: 2018-04-09 | End: 2018-05-06 | Stop reason: SDUPTHER

## 2018-05-06 DIAGNOSIS — D50.9 MICROCYTIC ANEMIA: ICD-10-CM

## 2018-05-06 DIAGNOSIS — Z79.899 ENCOUNTER FOR LONG-TERM (CURRENT) USE OF MEDICATIONS: ICD-10-CM

## 2018-05-06 DIAGNOSIS — K51.90 ULCERATIVE COLITIS WITHOUT COMPLICATIONS, UNSPECIFIED LOCATION: ICD-10-CM

## 2018-05-06 DIAGNOSIS — K52.9 COLITIS: ICD-10-CM

## 2018-05-06 DIAGNOSIS — K92.1 BLOOD IN STOOL: ICD-10-CM

## 2018-05-06 DIAGNOSIS — K52.9 IBD (INFLAMMATORY BOWEL DISEASE): ICD-10-CM

## 2018-05-07 RX ORDER — MESALAMINE 375 MG/1
CAPSULE, EXTENDED RELEASE ORAL
Qty: 120 CAPSULE | Refills: 0 | Status: SHIPPED | OUTPATIENT
Start: 2018-05-07 | End: 2018-06-05 | Stop reason: SDUPTHER

## 2018-06-03 DIAGNOSIS — I10 ESSENTIAL HYPERTENSION: ICD-10-CM

## 2018-06-03 DIAGNOSIS — K52.9 IBD (INFLAMMATORY BOWEL DISEASE): ICD-10-CM

## 2018-06-03 DIAGNOSIS — Z79.899 ENCOUNTER FOR LONG-TERM (CURRENT) USE OF MEDICATIONS: ICD-10-CM

## 2018-06-03 DIAGNOSIS — K51.90 ULCERATIVE COLITIS WITHOUT COMPLICATIONS, UNSPECIFIED LOCATION: ICD-10-CM

## 2018-06-03 DIAGNOSIS — K92.1 BLOOD IN STOOL: ICD-10-CM

## 2018-06-03 DIAGNOSIS — K52.9 COLITIS: ICD-10-CM

## 2018-06-03 DIAGNOSIS — D50.9 MICROCYTIC ANEMIA: ICD-10-CM

## 2018-06-03 RX ORDER — MESALAMINE 375 MG/1
CAPSULE, EXTENDED RELEASE ORAL
Qty: 120 CAPSULE | Refills: 0 | Status: CANCELLED | OUTPATIENT
Start: 2018-06-03

## 2018-06-03 RX ORDER — LOSARTAN POTASSIUM 25 MG/1
TABLET ORAL
Qty: 30 TABLET | Refills: 5 | Status: SHIPPED | OUTPATIENT
Start: 2018-06-03 | End: 2018-12-14 | Stop reason: DRUGHIGH

## 2018-06-05 ENCOUNTER — TELEPHONE (OUTPATIENT)
Dept: INTERNAL MEDICINE | Facility: CLINIC | Age: 48
End: 2018-06-05

## 2018-06-05 DIAGNOSIS — Z79.899 ENCOUNTER FOR LONG-TERM (CURRENT) USE OF MEDICATIONS: ICD-10-CM

## 2018-06-05 DIAGNOSIS — K52.9 IBD (INFLAMMATORY BOWEL DISEASE): ICD-10-CM

## 2018-06-05 DIAGNOSIS — K92.1 BLOOD IN STOOL: ICD-10-CM

## 2018-06-05 DIAGNOSIS — K51.90 ULCERATIVE COLITIS WITHOUT COMPLICATIONS, UNSPECIFIED LOCATION: ICD-10-CM

## 2018-06-05 DIAGNOSIS — K52.9 COLITIS: ICD-10-CM

## 2018-06-05 DIAGNOSIS — D50.9 MICROCYTIC ANEMIA: ICD-10-CM

## 2018-06-05 RX ORDER — MESALAMINE 0.38 G/1
CAPSULE, EXTENDED RELEASE ORAL
Qty: 120 CAPSULE | Refills: 0 | Status: SHIPPED | OUTPATIENT
Start: 2018-06-05 | End: 2018-07-16 | Stop reason: SDUPTHER

## 2018-06-05 NOTE — TELEPHONE ENCOUNTER
----- Message from Katie Lovett sent at 6/5/2018 10:06 AM CDT -----  Regarding: [facility] Participation  Dr. Corine Taylor MD,    Your patient Mr. Jl Araiza  was enrolled in the HTN/DM Digital Medicine program on 7/18/17. Unfortunately, we have been unable to maintain contact with him to continue monitoring, despite our best efforts.     If you still believe this patient is a good candidate for digital medicine, please reach out to him to encourage participation. If going forward we are unable to communicate with him, we will unfortunately have to discharge him from the program.    Please let me know if you have any questions.    Sincerely,  Katie Lovett MA, CHES  [phone]

## 2018-06-05 NOTE — PROGRESS NOTES
Last 5 Patient Entered Readings                                      Current 30 Day Average:      Recent Readings 2/21/2018 2/1/2018 1/11/2018 1/7/2018 1/3/2018    SBP (mmHg) 122 128 124 140 135    DBP (mmHg) 90 97 88 91 91    Pulse 67 84 102 65 72        Messaged PCP regarding patients lack of participation.   Will send certified discharge letter in 1 week.

## 2018-06-13 NOTE — PROGRESS NOTES
Last 5 Patient Entered Readings                                      Current 30 Day Average:      Recent Readings 2/21/2018 2/1/2018 1/11/2018 1/7/2018 1/3/2018    SBP (mmHg) 122 128 124 140 135    DBP (mmHg) 90 97 88 91 91    Pulse 67 84 102 65 72        Sending noncompliance letter via [employer].  Will send certified discharge letter in 1 week.

## 2018-06-17 ENCOUNTER — NURSE TRIAGE (OUTPATIENT)
Dept: ADMINISTRATIVE | Facility: CLINIC | Age: 48
End: 2018-06-17

## 2018-06-18 ENCOUNTER — OFFICE VISIT (OUTPATIENT)
Dept: INTERNAL MEDICINE | Facility: CLINIC | Age: 48
End: 2018-06-18
Payer: COMMERCIAL

## 2018-06-18 VITALS
HEART RATE: 90 BPM | WEIGHT: 273.38 LBS | TEMPERATURE: 98 F | SYSTOLIC BLOOD PRESSURE: 140 MMHG | DIASTOLIC BLOOD PRESSURE: 90 MMHG | BODY MASS INDEX: 40.49 KG/M2 | HEIGHT: 69 IN

## 2018-06-18 DIAGNOSIS — I10 ESSENTIAL HYPERTENSION: ICD-10-CM

## 2018-06-18 DIAGNOSIS — J06.9 VIRAL URI WITH COUGH: Primary | ICD-10-CM

## 2018-06-18 DIAGNOSIS — E11.9 TYPE 2 DIABETES MELLITUS WITHOUT COMPLICATION, WITHOUT LONG-TERM CURRENT USE OF INSULIN: ICD-10-CM

## 2018-06-18 DIAGNOSIS — F41.1 GAD (GENERALIZED ANXIETY DISORDER): ICD-10-CM

## 2018-06-18 PROCEDURE — 3080F DIAST BP >= 90 MM HG: CPT | Mod: CPTII,S$GLB,, | Performed by: INTERNAL MEDICINE

## 2018-06-18 PROCEDURE — 3008F BODY MASS INDEX DOCD: CPT | Mod: CPTII,S$GLB,, | Performed by: INTERNAL MEDICINE

## 2018-06-18 PROCEDURE — 3044F HG A1C LEVEL LT 7.0%: CPT | Mod: CPTII,S$GLB,, | Performed by: INTERNAL MEDICINE

## 2018-06-18 PROCEDURE — 3077F SYST BP >= 140 MM HG: CPT | Mod: CPTII,S$GLB,, | Performed by: INTERNAL MEDICINE

## 2018-06-18 PROCEDURE — 99999 PR PBB SHADOW E&M-EST. PATIENT-LVL III: CPT | Mod: PBBFAC,,, | Performed by: INTERNAL MEDICINE

## 2018-06-18 PROCEDURE — 99213 OFFICE O/P EST LOW 20 MIN: CPT | Mod: S$GLB,,, | Performed by: INTERNAL MEDICINE

## 2018-06-18 RX ORDER — HYDROCHLOROTHIAZIDE 25 MG/1
TABLET ORAL
Qty: 30 TABLET | Refills: 5 | Status: SHIPPED | OUTPATIENT
Start: 2018-06-18 | End: 2018-12-14 | Stop reason: SDUPTHER

## 2018-06-18 RX ORDER — BUSPIRONE HYDROCHLORIDE 15 MG/1
TABLET ORAL
Qty: 60 TABLET | Refills: 5 | Status: SHIPPED | OUTPATIENT
Start: 2018-06-18 | End: 2019-01-12 | Stop reason: SDUPTHER

## 2018-06-18 NOTE — TELEPHONE ENCOUNTER
"  Reason for Disposition   Cough  (all triage questions negative)    Answer Assessment - Initial Assessment Questions  1. ONSET: "When did the cough begin?"       6/11  2. SEVERITY: "How bad is the cough today?"      mild  3. RESPIRATORY DISTRESS: "Describe your breathing."      no  4. FEVER: "Do you have a fever?" If so, ask: "What is your temperature, how was it measured, and when did it start?"  no  5. HEMOPTYSIS: "Are you coughing up any blood?" If so ask: "How much?" (flecks, streaks, tablespoons, etc.)    no  6. TREATMENT: "What have you done so far to treat the cough?" (e.g., meds, fluids, humidifier)  Hot shower and mucinex  7. CARDIAC HISTORY: "Do you have any history of heart disease?" (e.g., heart attack, congestive heart failure)     no  8. LUNG HISTORY: "Do you have any history of lung disease?"  (e.g., pulmonary embolus, asthma, emphysema)   no  9. PE RISK FACTORS: "Do you have a history of blood clots?" (or: recent major surgery, recent prolonged travel, bedridden )      *no  10. OTHER SYMPTOMS: "Do you have any other symptoms? (e.g., runny nose, wheezing, chest pain)    no  11. PREGNANCY: "Is there any chance you are pregnant?" "When was your last menstrual period?"   no  12. TRAVEL: "Have you traveled out of the country in the last month?" (e.g., travel history, exposures)      no    Protocols used: ST COUGH - ACUTE NON-PRODUCTIVE-A-AH    "

## 2018-06-18 NOTE — PROGRESS NOTES
Subjective:       Patient ID: Jl Araiza is a 48 y.o. male.    Chief Complaint: URI and Cough    Patient known to me, last seen 4 months ago. Presents urgently c/o acute onset cough beginning one week ago after exposure to his son sick with same. His son was evaluated by a doctor twice for persistent acute cough, treated only with steroids. Jl describes a non-productive cough with little else, just noticed a postnasal drip last night. His wife urged him to come in. He denies fever, chills, body aches, malaise, headache, facial pain/pressure, purulent nasal discharge, earache, sore throat, pleuritic chest pain, wheezing, SOB, N/V or diarrhea. Using OTC cough suppressants with some relief.     PMH:   Hypertension.   Diabetes type 2 diagnosed 10/17, HbA1c 6.0% Jan. '18.  Ulcerative Colitis  Iron Def Anemia  Allergic Rhinitis  Depression/Anxiety  Obesity.   Eye exam 11/17. PSA normal 1.1 Oct. '17. BMD normal 6/17. Labs 10/17: CBC normal, CMP normal except Fasting Glucose 150, TChol 203, , HDL 45, .     PSH: Umbilical Hernia Repair. EGD 6/16. Colonoscopy 8/17.     Social: Non-smoker, rare alcohol.  with an 10 yo son. .     Hep B vaccine 2015, Prevnar 4/15. Pneumovax 11/15. Tdap 11/15. Flu shot 9/30/17.    NKDA.    Medications: list reviewed and reconciled. Compliant with regular dosing of daily meds.                    Review of Systems    Objective:    /90, Pulse 90, Temp 98.4  Physical Exam   Constitutional: He is oriented to person, place, and time. He appears well-developed and well-nourished. He does not appear ill. No distress.   Well-appearing, rare cough during the visit.    HENT:   Right Ear: External ear normal.   Left Ear: External ear normal.   Nose: Nose normal.   Mouth/Throat: Oropharynx is clear and moist. No oropharyngeal exudate.   Eyes: Conjunctivae are normal. Right eye exhibits no discharge. Left eye exhibits no discharge.   Neck: Normal range of  motion. Neck supple.   Cardiovascular: Normal rate, regular rhythm and normal heart sounds.    Pulmonary/Chest: Effort normal and breath sounds normal. No accessory muscle usage. No tachypnea. No respiratory distress. He has no decreased breath sounds. He has no wheezes. He has no rhonchi. He has no rales.   Lymphadenopathy:     He has no cervical adenopathy.   Neurological: He is alert and oriented to person, place, and time.   Skin: Skin is warm and dry. He is not diaphoretic.       Assessment:       1. Viral URI with cough    2. Essential hypertension    3. Type 2 diabetes mellitus without complication, without long-term current use of insulin    4. MYAH (generalized anxiety disorder)        Plan:       Viral URI with cough - no evidence of bacterial infection, and no indication for steroids at this time. Cough suppressants and antihistamines as needed, he declines Rx.     Essential hypertension typically controlled, continue same.  -     hydroCHLOROthiazide (HYDRODIURIL) 25 MG tablet; TAKE 1 TABLET(25 MG) BY MOUTH EVERY DAY FOR HIGH BLOOD PRESSURE  Dispense: 30 tablet; Refill: 5    Type 2 diabetes mellitus without complication, without long-term current use of insulin - controlled on Metformin.     MYAH (generalized anxiety disorder)  -     busPIRone (BUSPAR) 15 MG tablet; TAKE 1 TABLET(15 MG) BY MOUTH TWICE DAILY  Dispense: 60 tablet; Refill: 5

## 2018-06-20 NOTE — PROGRESS NOTES
Last 5 Patient Entered Readings                                      Current 30 Day Average:      Recent Readings 2/21/2018 2/1/2018 1/11/2018 1/7/2018 1/3/2018    SBP (mmHg) 122 128 124 140 135    DBP (mmHg) 90 97 88 91 91    Pulse 67 84 102 65 72        Sending certified discharge letter today. Will notify enrolling provider at time of removal.   Will follow up in 1 week.

## 2018-06-27 ENCOUNTER — TELEPHONE (OUTPATIENT)
Dept: INTERNAL MEDICINE | Facility: CLINIC | Age: 48
End: 2018-06-27

## 2018-06-27 NOTE — TELEPHONE ENCOUNTER
----- Message from Katie Lovett sent at 6/27/2018  9:37 AM CDT -----  [facility]  Dr. Corine Taylor MD,    Unfortunately, . Jl Araiza has failed to meet basic participation requirements for the HTN digital medicine program and will be discharged from the program. We have tried to contact him on multiple occasions without success.  Unfortunately he is not a good candidate for digital medicine monitoring.     Thank you for your support of digital medicine! Please contact me if you have any questions or concerns.    Sincerely,  Katie Lovett MA, CHES  [phone]

## 2018-07-06 DIAGNOSIS — F41.1 GAD (GENERALIZED ANXIETY DISORDER): ICD-10-CM

## 2018-07-06 DIAGNOSIS — I10 ESSENTIAL HYPERTENSION: ICD-10-CM

## 2018-07-06 RX ORDER — BUSPIRONE HYDROCHLORIDE 15 MG/1
TABLET ORAL
Qty: 60 TABLET | Refills: 0 | OUTPATIENT
Start: 2018-07-06

## 2018-07-06 RX ORDER — HYDROCHLOROTHIAZIDE 25 MG/1
TABLET ORAL
Qty: 30 TABLET | Refills: 0 | OUTPATIENT
Start: 2018-07-06

## 2018-07-09 DIAGNOSIS — K52.9 COLITIS: ICD-10-CM

## 2018-07-09 DIAGNOSIS — Z79.899 ENCOUNTER FOR LONG-TERM (CURRENT) USE OF MEDICATIONS: ICD-10-CM

## 2018-07-09 DIAGNOSIS — K51.90 ULCERATIVE COLITIS WITHOUT COMPLICATIONS, UNSPECIFIED LOCATION: ICD-10-CM

## 2018-07-09 DIAGNOSIS — K52.9 IBD (INFLAMMATORY BOWEL DISEASE): ICD-10-CM

## 2018-07-09 DIAGNOSIS — K92.1 BLOOD IN STOOL: ICD-10-CM

## 2018-07-09 DIAGNOSIS — D50.9 MICROCYTIC ANEMIA: ICD-10-CM

## 2018-07-16 ENCOUNTER — PATIENT MESSAGE (OUTPATIENT)
Dept: INTERNAL MEDICINE | Facility: CLINIC | Age: 48
End: 2018-07-16

## 2018-07-16 DIAGNOSIS — K52.9 COLITIS: ICD-10-CM

## 2018-07-16 DIAGNOSIS — K52.9 IBD (INFLAMMATORY BOWEL DISEASE): ICD-10-CM

## 2018-07-16 DIAGNOSIS — Z79.899 ENCOUNTER FOR LONG-TERM (CURRENT) USE OF MEDICATIONS: ICD-10-CM

## 2018-07-16 DIAGNOSIS — K92.1 BLOOD IN STOOL: ICD-10-CM

## 2018-07-16 DIAGNOSIS — D50.9 MICROCYTIC ANEMIA: ICD-10-CM

## 2018-07-16 DIAGNOSIS — K51.90 ULCERATIVE COLITIS WITHOUT COMPLICATIONS, UNSPECIFIED LOCATION: ICD-10-CM

## 2018-07-16 RX ORDER — MESALAMINE 0.38 G/1
CAPSULE, EXTENDED RELEASE ORAL
Qty: 120 CAPSULE | Refills: 0 | Status: SHIPPED | OUTPATIENT
Start: 2018-07-16 | End: 2018-08-11 | Stop reason: SDUPTHER

## 2018-07-16 RX ORDER — MESALAMINE 375 MG/1
CAPSULE, EXTENDED RELEASE ORAL
Qty: 120 CAPSULE | Refills: 0 | OUTPATIENT
Start: 2018-07-16

## 2018-07-16 RX ORDER — MESALAMINE 0.38 G/1
CAPSULE, EXTENDED RELEASE ORAL
Qty: 120 CAPSULE | Refills: 0 | OUTPATIENT
Start: 2018-07-16

## 2018-08-11 DIAGNOSIS — K51.90 ULCERATIVE COLITIS WITHOUT COMPLICATIONS, UNSPECIFIED LOCATION: ICD-10-CM

## 2018-08-11 DIAGNOSIS — K52.9 IBD (INFLAMMATORY BOWEL DISEASE): ICD-10-CM

## 2018-08-11 DIAGNOSIS — Z79.899 ENCOUNTER FOR LONG-TERM (CURRENT) USE OF MEDICATIONS: ICD-10-CM

## 2018-08-11 DIAGNOSIS — D50.9 MICROCYTIC ANEMIA: ICD-10-CM

## 2018-08-11 DIAGNOSIS — K52.9 COLITIS: ICD-10-CM

## 2018-08-11 DIAGNOSIS — K92.1 BLOOD IN STOOL: ICD-10-CM

## 2018-08-11 RX ORDER — MESALAMINE 375 MG/1
CAPSULE, EXTENDED RELEASE ORAL
Qty: 120 CAPSULE | Refills: 0 | Status: SHIPPED | OUTPATIENT
Start: 2018-08-11 | End: 2018-09-14 | Stop reason: SDUPTHER

## 2018-09-14 DIAGNOSIS — D50.9 MICROCYTIC ANEMIA: ICD-10-CM

## 2018-09-14 DIAGNOSIS — K52.9 IBD (INFLAMMATORY BOWEL DISEASE): ICD-10-CM

## 2018-09-14 DIAGNOSIS — K52.9 COLITIS: ICD-10-CM

## 2018-09-14 DIAGNOSIS — K51.90 ULCERATIVE COLITIS WITHOUT COMPLICATIONS, UNSPECIFIED LOCATION: ICD-10-CM

## 2018-09-14 DIAGNOSIS — K92.1 BLOOD IN STOOL: ICD-10-CM

## 2018-09-14 DIAGNOSIS — Z79.899 ENCOUNTER FOR LONG-TERM (CURRENT) USE OF MEDICATIONS: ICD-10-CM

## 2018-09-14 RX ORDER — MESALAMINE 375 MG/1
CAPSULE, EXTENDED RELEASE ORAL
Qty: 120 CAPSULE | Refills: 1 | Status: SHIPPED | OUTPATIENT
Start: 2018-09-14 | End: 2018-12-14 | Stop reason: SDUPTHER

## 2018-10-18 DIAGNOSIS — D50.9 MICROCYTIC ANEMIA: ICD-10-CM

## 2018-10-18 DIAGNOSIS — R19.7 DIARRHEA: ICD-10-CM

## 2018-10-18 DIAGNOSIS — K52.9 IBD (INFLAMMATORY BOWEL DISEASE): ICD-10-CM

## 2018-10-18 DIAGNOSIS — K92.1 BLOOD IN STOOL: ICD-10-CM

## 2018-10-18 DIAGNOSIS — K51.90 ULCERATIVE COLITIS WITHOUT COMPLICATIONS: ICD-10-CM

## 2018-10-18 DIAGNOSIS — Z79.899 ENCOUNTER FOR LONG-TERM (CURRENT) USE OF MEDICATIONS: ICD-10-CM

## 2018-10-18 DIAGNOSIS — K52.9 COLITIS: ICD-10-CM

## 2018-10-18 RX ORDER — MESALAMINE 375 MG/1
CAPSULE, EXTENDED RELEASE ORAL
Qty: 120 CAPSULE | Refills: 0 | Status: SHIPPED | OUTPATIENT
Start: 2018-10-18 | End: 2018-12-14 | Stop reason: SDUPTHER

## 2018-12-14 ENCOUNTER — OFFICE VISIT (OUTPATIENT)
Dept: INTERNAL MEDICINE | Facility: CLINIC | Age: 48
End: 2018-12-14
Payer: COMMERCIAL

## 2018-12-14 ENCOUNTER — PATIENT MESSAGE (OUTPATIENT)
Dept: INTERNAL MEDICINE | Facility: CLINIC | Age: 48
End: 2018-12-14

## 2018-12-14 ENCOUNTER — OFFICE VISIT (OUTPATIENT)
Dept: PODIATRY | Facility: CLINIC | Age: 48
End: 2018-12-14
Payer: COMMERCIAL

## 2018-12-14 ENCOUNTER — IMMUNIZATION (OUTPATIENT)
Dept: INTERNAL MEDICINE | Facility: CLINIC | Age: 48
End: 2018-12-14
Payer: COMMERCIAL

## 2018-12-14 VITALS
DIASTOLIC BLOOD PRESSURE: 100 MMHG | HEART RATE: 86 BPM | HEIGHT: 69 IN | WEIGHT: 257.88 LBS | SYSTOLIC BLOOD PRESSURE: 140 MMHG | BODY MASS INDEX: 38.19 KG/M2

## 2018-12-14 VITALS
HEART RATE: 84 BPM | SYSTOLIC BLOOD PRESSURE: 141 MMHG | WEIGHT: 257 LBS | HEIGHT: 71 IN | DIASTOLIC BLOOD PRESSURE: 94 MMHG | BODY MASS INDEX: 35.98 KG/M2

## 2018-12-14 DIAGNOSIS — E66.01 SEVERE OBESITY (BMI 35.0-39.9) WITH COMORBIDITY: ICD-10-CM

## 2018-12-14 DIAGNOSIS — E11.9 TYPE 2 DIABETES MELLITUS WITHOUT COMPLICATION, WITHOUT LONG-TERM CURRENT USE OF INSULIN: Primary | ICD-10-CM

## 2018-12-14 DIAGNOSIS — I10 ESSENTIAL HYPERTENSION: ICD-10-CM

## 2018-12-14 DIAGNOSIS — E11.65 TYPE 2 DIABETES MELLITUS WITH HYPERGLYCEMIA, WITHOUT LONG-TERM CURRENT USE OF INSULIN: Primary | ICD-10-CM

## 2018-12-14 DIAGNOSIS — L85.3 DRY SKIN: ICD-10-CM

## 2018-12-14 DIAGNOSIS — K51.90 ULCERATIVE COLITIS WITHOUT COMPLICATIONS, UNSPECIFIED LOCATION: ICD-10-CM

## 2018-12-14 DIAGNOSIS — F41.1 GAD (GENERALIZED ANXIETY DISORDER): ICD-10-CM

## 2018-12-14 DIAGNOSIS — E11.65 UNCONTROLLED TYPE 2 DIABETES MELLITUS WITH HYPERGLYCEMIA, WITHOUT LONG-TERM CURRENT USE OF INSULIN: Primary | ICD-10-CM

## 2018-12-14 DIAGNOSIS — Z23 INFLUENZA VACCINE NEEDED: ICD-10-CM

## 2018-12-14 DIAGNOSIS — L02.414 ABSCESS OF ARM, LEFT: ICD-10-CM

## 2018-12-14 LAB
ALBUMIN/CREAT UR: 53.7 UG/MG
CREAT UR-MCNC: 108 MG/DL
GLUCOSE SERPL-MCNC: 290 MG/DL (ref 70–110)
MICROALBUMIN UR DL<=1MG/L-MCNC: 58 UG/ML

## 2018-12-14 PROCEDURE — 99203 OFFICE O/P NEW LOW 30 MIN: CPT | Mod: S$GLB,,, | Performed by: PODIATRIST

## 2018-12-14 PROCEDURE — 90686 IIV4 VACC NO PRSV 0.5 ML IM: CPT | Mod: S$GLB,,, | Performed by: INTERNAL MEDICINE

## 2018-12-14 PROCEDURE — 99214 OFFICE O/P EST MOD 30 MIN: CPT | Mod: S$GLB,,, | Performed by: INTERNAL MEDICINE

## 2018-12-14 PROCEDURE — 3080F DIAST BP >= 90 MM HG: CPT | Mod: CPTII,S$GLB,, | Performed by: INTERNAL MEDICINE

## 2018-12-14 PROCEDURE — 3046F HEMOGLOBIN A1C LEVEL >9.0%: CPT | Mod: CPTII,S$GLB,, | Performed by: INTERNAL MEDICINE

## 2018-12-14 PROCEDURE — 90471 IMMUNIZATION ADMIN: CPT | Mod: S$GLB,,, | Performed by: INTERNAL MEDICINE

## 2018-12-14 PROCEDURE — 3077F SYST BP >= 140 MM HG: CPT | Mod: CPTII,S$GLB,, | Performed by: INTERNAL MEDICINE

## 2018-12-14 PROCEDURE — 82962 GLUCOSE BLOOD TEST: CPT | Mod: S$GLB,,, | Performed by: INTERNAL MEDICINE

## 2018-12-14 PROCEDURE — 3046F HEMOGLOBIN A1C LEVEL >9.0%: CPT | Mod: CPTII,S$GLB,, | Performed by: PODIATRIST

## 2018-12-14 PROCEDURE — 99999 PR PBB SHADOW E&M-EST. PATIENT-LVL IV: CPT | Mod: PBBFAC,,, | Performed by: INTERNAL MEDICINE

## 2018-12-14 PROCEDURE — 3077F SYST BP >= 140 MM HG: CPT | Mod: CPTII,S$GLB,, | Performed by: PODIATRIST

## 2018-12-14 PROCEDURE — 3008F BODY MASS INDEX DOCD: CPT | Mod: CPTII,S$GLB,, | Performed by: INTERNAL MEDICINE

## 2018-12-14 PROCEDURE — 3080F DIAST BP >= 90 MM HG: CPT | Mod: CPTII,S$GLB,, | Performed by: PODIATRIST

## 2018-12-14 PROCEDURE — 3008F BODY MASS INDEX DOCD: CPT | Mod: CPTII,S$GLB,, | Performed by: PODIATRIST

## 2018-12-14 PROCEDURE — 82043 UR ALBUMIN QUANTITATIVE: CPT

## 2018-12-14 PROCEDURE — 99999 PR PBB SHADOW E&M-EST. PATIENT-LVL IV: CPT | Mod: PBBFAC,,, | Performed by: PODIATRIST

## 2018-12-14 RX ORDER — MESALAMINE 0.38 G/1
CAPSULE, EXTENDED RELEASE ORAL
Qty: 120 CAPSULE | Refills: 2 | Status: SHIPPED | OUTPATIENT
Start: 2018-12-14 | End: 2019-03-09 | Stop reason: SDUPTHER

## 2018-12-14 RX ORDER — AMMONIUM LACTATE 12 G/100G
CREAM TOPICAL
Qty: 1 TUBE | Refills: 3 | Status: SHIPPED | OUTPATIENT
Start: 2018-12-14 | End: 2019-07-24

## 2018-12-14 RX ORDER — LOSARTAN POTASSIUM 50 MG/1
50 TABLET ORAL DAILY
Qty: 30 TABLET | Refills: 5 | Status: SHIPPED | OUTPATIENT
Start: 2018-12-14 | End: 2019-06-08 | Stop reason: SDUPTHER

## 2018-12-14 RX ORDER — METFORMIN HYDROCHLORIDE 500 MG/1
1000 TABLET, EXTENDED RELEASE ORAL DAILY
Qty: 60 TABLET | Refills: 3 | Status: SHIPPED | OUTPATIENT
Start: 2018-12-14 | End: 2019-04-06 | Stop reason: SDUPTHER

## 2018-12-14 RX ORDER — HYDROCHLOROTHIAZIDE 25 MG/1
TABLET ORAL
Qty: 30 TABLET | Refills: 5 | Status: SHIPPED | OUTPATIENT
Start: 2018-12-14 | End: 2019-08-27 | Stop reason: SDUPTHER

## 2018-12-14 NOTE — PROGRESS NOTES
Subjective:       Patient ID: Jl Araiza is a 48 y.o. male.    Chief Complaint: Diabetes    Last seen six months ago. Returns urgently for f/u Diabetes. Admits he stopped taking Metformin 4 months ago, and has not been monitoring his sugar (does have supplies). Some stress associated with a close friend who has been sick, and chronic work-related stress; but primarily attributes his non-compliance to denial of this diagnosis made one year ago when HbA1c exceeded 7%. He complains of acutely blurred vision in both eyes, polydipsia and polyuria. Has also lost weight, but thought it was due to healthy changes in his diet. Has discomfort in feet - iching of both soles associated with dry skin; no burning pain, numbness or tingling. Recently sustained a spider bite to medial upper left arm which quickly became an abscess the size of a golf ball - he drained it on his own and it is nearly healed.     PMH:   Hypertension.   Diabetes type 2 diagnosed 10/17, HbA1c 6.0% Jan. '18 (from 7.1).  Ulcerative Colitis, last seen by GI summer 2017.  Iron Def Anemia  Allergic Rhinitis  Depression/Anxiety, last seen by Psychiatry 12/17.  Obesity.   Eye exam 11/17. PSA normal 1.1 Oct. '17. BMD normal 6/17. Labs 10/17: CBC normal, CMP normal except Fasting Glucose 150, TChol 203, , HDL 45, .     PSH: Umbilical Hernia Repair. EGD 6/16. Colonoscopy 8/17.     Social: Non-smoker, rare alcohol.  with a son. .     Hep B vaccine 2015, Prevnar 4/15. Pneumovax 11/15. Tdap 11/15. Flu shot 9/30/17.    NKDA.    Medications: list reviewed and reconciled. Not taking Metformin.                    Review of Systems   Constitutional: Positive for unexpected weight change. Negative for activity change, appetite change, chills, diaphoresis, fatigue and fever.   HENT: Negative for hearing loss, rhinorrhea and trouble swallowing.    Eyes: Positive for visual disturbance. Negative for discharge.   Respiratory: Negative  for chest tightness and wheezing.    Cardiovascular: Negative for chest pain, palpitations and leg swelling.   Gastrointestinal: Negative for abdominal pain, blood in stool, constipation, diarrhea and vomiting.   Endocrine: Positive for polydipsia and polyuria.   Genitourinary: Positive for urgency. Negative for difficulty urinating and hematuria.   Musculoskeletal: Negative for arthralgias, joint swelling and neck pain.   Neurological: Negative for weakness and headaches.   Psychiatric/Behavioral: Positive for dysphoric mood. Negative for confusion.       Objective:    /100, Pulse 86, Ht 5' 9, Wt 258 lbs (from 273 six months ago), BMI=38, Fasting Glucose =290  Physical Exam   Constitutional: He is oriented to person, place, and time. He appears well-nourished. No distress.   HENT:   Nose: Nose normal.   Mouth/Throat: Oropharynx is clear and moist.   Eyes: Conjunctivae and EOM are normal. Pupils are equal, round, and reactive to light.   Cardiovascular: Normal rate, regular rhythm and normal heart sounds.   Pulmonary/Chest: Effort normal and breath sounds normal. No respiratory distress.   Musculoskeletal: Normal range of motion. He exhibits no edema.   Protective Sensation (w/ 10 gram monofilament):  Right: Decreased  Left: Decreased    Visual Inspection:  Normal -  Bilateral except dry peeling skin on both soles with no evidence of secondary infection.    Pedal Pulses:   Right: Present  Left: Present    Posterior tibialis:   Right:Present  Left: Present     Neurological: He is alert and oriented to person, place, and time. No cranial nerve deficit.   Skin:   3x4mm shallow wound medial upper left arm with no surrounding erythema and very little induration at this time, no drainage.    Psychiatric: He has a normal mood and affect. His behavior is normal.       Assessment:       1. Type 2 diabetes mellitus without complication, without long-term current use of insulin    2. Essential hypertension    3.  Ulcerative colitis without complications, unspecified location    4. MYAH (generalized anxiety disorder)    5. Severe obesity (BMI 35.0-39.9) with comorbidity    6. Influenza vaccine needed        Plan:       Type 2 diabetes mellitus with hyperglycemia, without long-term current use of insulin  -     POCT Glucose, Hand-Held Device  -     Ambulatory Referral to Podiatry  -     Eye exam is already scheduled later this month.  -     CBC auto differential; Future; Expected date: 12/14/2018  -     Comprehensive metabolic panel; Future; Expected date: 12/14/2018  -     Lipid panel; Future; Expected date: 12/14/2018  -     Hemoglobin A1c; Future; Expected date: 12/14/2018  -     Microalbumin/creatinine urine ratio  -     Resume MetFORMIN (GLUCOPHAGE-XR) 500 MG 24 hr tablet; Take 2 tablets (1,000 mg total) by mouth once daily.  Dispense: 60 tablet; Refill: 3  -     Ambulatory consult to Diabetic Education  -     He has monitoring supplies.    Essential hypertension not at goal  -     Continue HydroCHLOROthiazide (HYDRODIURIL) 25 MG tablet; TAKE 1 TABLET(25 MG) BY MOUTH EVERY DAY FOR HIGH BLOOD PRESSURE  Dispense: 30 tablet; Refill: 5  -     Increase Losartan (COZAAR) 50 MG tablet; Take 1 tablet (50 mg total) by mouth once daily.  Dispense: 30 tablet; Refill: 5    Ulcerative colitis without complications, unspecified location  -     mesalamine (APRISO) 0.375 gram Cp24; TAKE 4 CAPSULES(1.5 GRAMS) BY MOUTH EVERY DAY  Dispense: 120 capsule; Refill: 2  -     Sedimentation rate; Future; Expected date: 12/14/2018  -     C-reactive protein; Future; Expected date: 12/14/2018    MYAH (generalized anxiety disorder) - continue Buspar    Severe obesity (BMI 35.0-39.9) with comorbidity- encouraged diet and exercise.     Abscess of arm, left - resolved, no intervention needed at this time.     Influenza vaccine needed - Flu shot today.

## 2018-12-14 NOTE — PATIENT INSTRUCTIONS
Diabetic Foot Care  Diabetes can lead to a number of foot complications. Fortunately, you can prevent most of these with a little extra foot care. If diabetes is not well controlled, it can cause damage to blood vessels and result in poor circulation to the foot. When the skin does not get enough blood flow, it becomes prone to pressure sores and ulcers, which heal slowly.  Diabetes can also damage nerves, interfering with the ability to feel pain and pressure. When you cant feel your foot normally, it is easy to injure your skin, bones, and joints without knowing it. For these reasons diabetes increases the risk of fungal infections, bunions, and ulcers. An ulcer is a sore or break in the skin. With ulcers, often the skin seems to have worn away. Deep ulcers can lead to bone infection.  Gangrene is the most serious foot complication of diabetes. It usually occurs on the tips of the toes as blackened areas of skin. The black area is dead tissue. In severe cases, gangrene spreads to involve the entire toe, other toes, and the entire foot. Foot or toe amputation may be required. Good foot care and blood sugar control can prevent this.  Home care  · Wear comfortable, well-fitting shoes.  · Wash your feet daily with warm water and mild soap.  · After drying, apply a moisturizing cream or lotion to the top and bottom of your feet. Don't put lotion between toes.  · Check your feet daily for skin breaks, blisters, swelling, or redness. Look between your toes as well. If you cannot see the bottoms of your feet, ask someone to look or use a mirror.  · Wear cotton socks and change them every day.  · Trim toenails carefully, and do not cut your cuticles.  · Strive to keep your blood sugar under control with a combination of medicines, diet, and activity.  · If you smoke and have diabetes, it is very important that you stop. Smoking reduces blood flow to your foot.  · Schedule foot exams at least every year, or more often if  you have foot problems.  · Put your feet up when sitting, wiggle toes, and move ankles to help improve blood flow.  Avoid activities that increase your risk of foot injury:  · Do not walk barefoot.  · Do not use heating pads or hot water bottles on your feet.  · Do not put your foot in a hot tub without first checking the temperature with your hand.  Follow-up care  Follow up with your healthcare provider, or as advised. Be sure to take off your shoes and socks before your appointment starts so your healthcare provider will be sure to check your feet. Report any cut, puncture, scrape, blister, or other injury to your foot. Also report if you have a bunion, hammertoes, ingrown toenail, or ulcer on your foot.  When to seek medical advice  Call your healthcare provider right away if any of these occur:  · Black skin color anywhere on the foot  · Open ulcer with pus draining from the wound  · Increasing foot or leg pain  · New areas of redness or swelling or tender areas of the foot  · Fever of 100.4°F (38°C) or greater  Date Last Reviewed: 5/25/2016  © 6115-5056 [employer]. [address] [place] [address], [place] [address]. All rights reserved. This information is not intended as a substitute for professional medical care. Always follow your healthcare professional's instructions.

## 2018-12-14 NOTE — LETTER
December 21, 2018      Corine Taylor MD  [address]  [place] [address]           [facility] - Podiatry  [address]  [place] [address]  Phone: [phone]          Patient: Jl Araiza   MR Number: [id number]   YOB: 1970   Date of Visit: 12/14/2018       Dear Dr. Corine Taylor:    Thank you for referring Jl Araiza to me for evaluation. Attached you will find relevant portions of my assessment and plan of care.    If you have questions, please do not hesitate to call me. I look forward to following Jl Araiza along with you.    Sincerely,    Valeria Richmond, ZAHIDA    Enclosure  CC:  No Recipients    If you would like to receive this communication electronically, please contact [email or web address] or [phone] to request more information on [employer] Link access.    For providers and/or their staff who would like to refer a patient to [facility], please contact us through our one-stop-shop provider referral line, [facility], at [phone].    If you feel you have received this communication in error or would no longer like to receive these types of communications, please e-mail [email or web address]

## 2018-12-16 DIAGNOSIS — I10 ESSENTIAL HYPERTENSION: ICD-10-CM

## 2018-12-16 DIAGNOSIS — E78.5 HYPERLIPIDEMIA, UNSPECIFIED HYPERLIPIDEMIA TYPE: ICD-10-CM

## 2018-12-16 DIAGNOSIS — E11.65 UNCONTROLLED TYPE 2 DIABETES MELLITUS WITH HYPERGLYCEMIA: Primary | ICD-10-CM

## 2018-12-16 RX ORDER — PRAVASTATIN SODIUM 20 MG/1
20 TABLET ORAL DAILY
Qty: 30 TABLET | Refills: 3 | Status: SHIPPED | OUTPATIENT
Start: 2018-12-16 | End: 2019-04-06 | Stop reason: SDUPTHER

## 2018-12-16 RX ORDER — PEN NEEDLE, DIABETIC 32GX 5/32"
1 NEEDLE, DISPOSABLE MISCELLANEOUS DAILY
Qty: 50 EACH | Refills: 3 | Status: SHIPPED | OUTPATIENT
Start: 2018-12-16 | End: 2019-07-24

## 2018-12-16 RX ORDER — INSULIN GLARGINE 100 [IU]/ML
10 INJECTION, SOLUTION SUBCUTANEOUS NIGHTLY
Qty: 1 BOX | Refills: 1 | Status: SHIPPED | OUTPATIENT
Start: 2018-12-16 | End: 2019-03-14 | Stop reason: HOSPADM

## 2018-12-16 RX ORDER — LOSARTAN POTASSIUM 25 MG/1
TABLET ORAL
Qty: 30 TABLET | Refills: 0 | OUTPATIENT
Start: 2018-12-16

## 2018-12-17 ENCOUNTER — CLINICAL SUPPORT (OUTPATIENT)
Dept: DIABETES | Facility: CLINIC | Age: 48
End: 2018-12-17
Payer: COMMERCIAL

## 2018-12-17 DIAGNOSIS — E11.65 TYPE 2 DIABETES MELLITUS WITH HYPERGLYCEMIA, WITHOUT LONG-TERM CURRENT USE OF INSULIN: ICD-10-CM

## 2018-12-17 PROCEDURE — 99999 PR PBB SHADOW E&M-EST. PATIENT-LVL II: CPT | Mod: PBBFAC,,,

## 2018-12-17 PROCEDURE — G0108 DIAB MANAGE TRN  PER INDIV: HCPCS | Mod: S$GLB,,, | Performed by: INTERNAL MEDICINE

## 2018-12-17 NOTE — PROGRESS NOTES
Diabetes Education  Author: Saida Basurto RD, CDE  Date: 12/17/2018    Diabetes Care Management Summary  Diabetes Education Record Assessment/Progress: Initial(Last seen for DE in Nov 2017; here for Lantus training today)     Diabetes Type  Diabetes Type : Type II    Diabetes History  Diabetes Diagnosis: 1-3 years  Current Treatment: Oral Medication, Insulin(Metformin BID - had stopped taking it several months ago; here for Lantus training)    Health Maintenance was reviewed today with patient. Discussed with patient importance of routine eye exams, foot exams/foot care, blood work (i.e.: A1c, microalbumin, and lipid), dental visits, yearly flu vaccine, and pneumonia vaccine as indicated by PCP. Patient verbalized understanding.     Health Maintenance Topics with due status: Not Due       Topic Last Completion Date    TETANUS VACCINE 11/09/2015    Foot Exam 12/14/2018    Lipid Panel 12/14/2018    Hemoglobin A1c 12/14/2018    Low Dose Statin 12/16/2018     Health Maintenance Due   Topic Date Due    Eye Exam  11/20/2018       Nutrition  Meal Planning: 3 meals per day, water, eats out often(No more snacks; eats out on weekends)  What type of sweetener do you use?: none  What type of beverages do you drink?: diet soda/tea, water    Monitoring   Self Monitoring : (Checks once a day - interested in Freestyle Moose)  Blood Glucose Logs: No  Do you use a personal continuous glucose monitor?: No  In the last month, how often have you had a low blood sugar reaction?: never  What are your symptoms of low blood sugar?: (N/A)  How do you treat low blood sugar?: (N/A)  Can you tell when your blood sugar is too high?: yes(Blurry vision; frequent urintation, thirst, yeast infection)  How do you treat high blood sugar?: (None)    Exercise   Exercise Type: none(Plans to start)    Current Diabetes Treatment   Current Treatment: Oral Medication, Insulin(Metformin BID - had stopped taking it several months ago; here for Lantus  training)    Social History  Preferred Learning Method: Face to Face  Primary Support: Self, Spouse  Smoking Status: Never a Smoker  Alcohol Use: Never    Barriers to Change  Barriers to Change: None  Learning Challenges : None    Readiness to Learn   Readiness to Learn : Acceptance    Cultural Influences  Cultural Influences: No    Diabetes Education Assessment/Progress  Diabetes Disease Process (diabetes disease process and treatment options): Instructed, Discussion, Individual Session, Written Materials Provided  Nutrition (Incorporating nutritional management into one's lifestyle): Instructed, Discussion, Individual Session, Written Materials Provided(Reviewed CHO counting label reading and addt'l resources to assist w/ CHO counting; plate method discussed)  Physical Activity (incorporating physical activity into one's lifestyle): Instructed, Discussion, Individual Session, Written Materials Provided(Reviewed goals and benefits)  Medications (states correct name, dose, onset, peak, duration, side effects & timing of meds): Instructed, Discussion, Individual Session, Written Materials Provided(Reviewed medication regimen; instructed patient on use of the Lantus pen - attach pen needle, perform airshot, dial up dose, subq injection, remove needle; patient performed Lantus injection in right abdomen successfully)  Monitoring (monitoring blood glucose/other parameters & using results): Instructed, Discussion, Individual Session, Written Materials Provided(Reviewed SMBG schedule and BG goals; Rx request sent to PCP for Freestyle Moose)  Acute Complications (preventing, detecting, and treating acute complications): Instructed, Discussion, Individual Session, Written Materials Provided(Reviewed s/s and treatment of hypoglycemia)  Chronic Complications (preventing, detecting, and treating chronic complications): Instructed, Discussion, Individual Session, Written Materials Provided(Reviewed standard of care; scheduled  for eye exam 2/1/19)  Clinical (diabetes, other pertinent medical history, and relevant comorbidities reviewed during visit): Instructed, Discussion, Individual Session  Cognitive (knowledge of self-management skills, functional health literacy): Instructed, Discussion, Individual Session  Psychosocial (emotional response to diabetes): Instructed, Discussion, Individual Session  Diabetes Distress and Support Systems: Not Covered/Deferred(Time constraints)  Behavioral (readiness for change, lifestyle practices, self-care behaviors): Instructed, Discussion, Individual Session    Goals  Patient has selected/evaluated goals during today's session: Yes, selected  Physical Activity: Set(150 min a week or 43671 steps a day)    Diabetes Care Plan/Intervention  Education Plan/Intervention: Individual Follow-Up DSMT(Follow up for Moose Start)    Diabetes Meal Plan  Carbohydrate Per Meal: 45-60g  Carbohydrate Per Snack : 7-15g    Today's Self-Management Care Plan was developed with the patient's input and is based on barriers identified during today's assessment.    The long and short-term goals in the care plan were written with the patient/caregiver's input. The patient has agreed to work toward these goals to improve his overall diabetes control.      The patient received a copy of today's self-management plan and verbalized understanding of the care plan, goals, and all of today's instructions.      The patient was encouraged to communicate with his physician and care team regarding his condition(s) and treatment.  I provided the patient with my contact information today and encouraged him to contact me via phone or patient portal as needed.     Education Units of Time   Time Spent: 60 min

## 2018-12-19 ENCOUNTER — CLINICAL SUPPORT (OUTPATIENT)
Dept: DIABETES | Facility: CLINIC | Age: 48
End: 2018-12-19
Payer: COMMERCIAL

## 2018-12-19 DIAGNOSIS — E11.9 TYPE 2 DIABETES MELLITUS WITHOUT COMPLICATION, WITHOUT LONG-TERM CURRENT USE OF INSULIN: ICD-10-CM

## 2018-12-19 PROCEDURE — G0108 DIAB MANAGE TRN  PER INDIV: HCPCS | Mod: S$GLB,,, | Performed by: INTERNAL MEDICINE

## 2018-12-19 NOTE — PROGRESS NOTES
Diabetes Education  Author: Saida Basurto RD, CDE  Date: 12/19/2018    Diabetes Care Management Summary  Diabetes Education Record Assessment/Progress: Comprehensive/Group(Patient is here for Moose Start)     Diabetes Type  Diabetes Type : Type II    Health Maintenance was reviewed today with patient. Discussed with patient importance of routine eye exams, foot exams/foot care, blood work (i.e.: A1c, microalbumin, and lipid), dental visits, yearly flu vaccine, and pneumonia vaccine as indicated by PCP. Patient verbalized understanding.     Health Maintenance Topics with due status: Not Due       Topic Last Completion Date    TETANUS VACCINE 11/09/2015    Foot Exam 12/14/2018    Lipid Panel 12/14/2018    Hemoglobin A1c 12/14/2018    Low Dose Statin 12/16/2018     Health Maintenance Due   Topic Date Due    Eye Exam  11/20/2018     Diabetes Education Assessment/Progress  Monitoring (monitoring blood glucose/other parameters & using results): Instructed, Discussion, Individual Session, Written Materials Provided(Instructed patient on Moose - with reader and iphone tray. Patient inserted sensor into back of right arm; advised of 60 min warm up and need to change sensor every 14 days)    Goals  Patient has selected/evaluated goals during today's session: No    Diabetes Care Plan/Intervention  Education Plan/Intervention: Individual Follow-Up DSMT    Today's Self-Management Care Plan was developed with the patient's input and is based on barriers identified during today's assessment.    The long and short-term goals in the care plan were written with the patient/caregiver's input. The patient has agreed to work toward these goals to improve his overall diabetes control.      The patient received a copy of today's self-management plan and verbalized understanding of the care plan, goals, and all of today's instructions.      The patient was encouraged to communicate with his physician and care team regarding his condition(s)  and treatment.  I provided the patient with my contact information today and encouraged him to contact me via phone or patient portal as needed.     Education Units of Time   Time Spent: 30 min

## 2018-12-21 NOTE — PROGRESS NOTES
Subjective:      Patient ID: Jl Araiza is a 48 y.o. male.    Chief Complaint: Diabetes Mellitus (dr taylor 12/14/2018) and Diabetic Foot Exam    Jl is a 48 y.o. male who presents to the clinic upon referral from Dr. Taylor  for evaluation and treatment of diabetic feet. Jl has a past medical history of Abdominal hernia, Anxiety, Essential hypertension (11/2/2015), Hyperlipidemia, Obese, and UC (ulcerative colitis). Patient relates no major problem with feet. Only complaints today consist of diabetic foot exam.    PCP: Saida Basurto RD, CDE    Date Last Seen by PCP: dr taylor 12/14/2018    Current shoe gear: Tennis shoes    Hemoglobin A1C   Date Value Ref Range Status   12/14/2018 12.3 (H) 4.0 - 5.6 % Final     Comment:     ADA Screening Guidelines:  5.7-6.4%  Consistent with prediabetes  >or=6.5%  Consistent with diabetes  High levels of fetal hemoglobin interfere with the HbA1C  assay. Heterozygous hemoglobin variants (HbS, HgC, etc)do  not significantly interfere with this assay.   However, presence of multiple variants may affect accuracy.     01/25/2018 6.0 (H) 4.0 - 5.6 % Final     Comment:     According to ADA guidelines, hemoglobin A1c <7.0% represents  optimal control in non-pregnant diabetic patients. Different  metrics may apply to specific patient populations.   Standards of Medical Care in Diabetes-2016.  For the purpose of screening for the presence of diabetes:  <5.7%     Consistent with the absence of diabetes  5.7-6.4%  Consistent with increasing risk for diabetes   (prediabetes)  >or=6.5%  Consistent with diabetes  Currently, no consensus exists for use of hemoglobin A1c  for diagnosis of diabetes for children.  This Hemoglobin A1c assay has significant interference with fetal   hemoglobin   (HbF). The results are invalid for patients with abnormal amounts of   HbF,   including those with known Hereditary Persistence   of Fetal Hemoglobin. Heterozygous hemoglobin variants (HbAS,  HbAC,   HbAD, HbAE, HbA2) do not significantly interfere with this assay;   however, presence of multiple variants in a sample may impact the %   interference.     10/13/2017 7.1 (H) 4.0 - 5.6 % Final     Comment:     According to ADA guidelines, hemoglobin A1c <7.0% represents  optimal control in non-pregnant diabetic patients. Different  metrics may apply to specific patient populations.   Standards of Medical Care in Diabetes-2016.  For the purpose of screening for the presence of diabetes:  <5.7%     Consistent with the absence of diabetes  5.7-6.4%  Consistent with increasing risk for diabetes   (prediabetes)  >or=6.5%  Consistent with diabetes  Currently, no consensus exists for use of hemoglobin A1c  for diagnosis of diabetes for children.  This Hemoglobin A1c assay has significant interference with fetal   hemoglobin   (HbF). The results are invalid for patients with abnormal amounts of   HbF,   including those with known Hereditary Persistence   of Fetal Hemoglobin. Heterozygous hemoglobin variants (HbAS, HbAC,   HbAD, HbAE, HbA2) do not significantly interfere with this assay;   however, presence of multiple variants in a sample may impact the %   interference.             Review of Systems   Constitution: Negative for chills, fever and malaise/fatigue.   HENT: Negative for hearing loss.    Cardiovascular: Negative for claudication.   Respiratory: Negative for shortness of breath.    Skin: Positive for dry skin. Negative for flushing and rash.   Musculoskeletal: Negative for joint pain and myalgias.   Neurological: Negative for loss of balance, numbness, paresthesias and sensory change.   Psychiatric/Behavioral: Negative for altered mental status.           Objective:      Physical Exam   Constitutional: He is oriented to person, place, and time. He appears well-developed and well-nourished.   Cardiovascular:   Pulses:       Dorsalis pedis pulses are 2+ on the right side, and 2+ on the left side.         Posterior tibial pulses are 2+ on the right side, and 2+ on the left side.   no edema noted to b/L LEs   Musculoskeletal:        Right knee: He exhibits no swelling and no ecchymosis.        Left knee: He exhibits no swelling and no ecchymosis.        Right ankle: He exhibits normal range of motion, no swelling, no ecchymosis and normal pulse. No lateral malleolus, no medial malleolus and no head of 5th metatarsal tenderness found. Achilles tendon exhibits no pain, no defect and normal Canales's test results.        Left ankle: He exhibits normal range of motion, no swelling, no ecchymosis and normal pulse. No lateral malleolus, no medial malleolus and no head of 5th metatarsal tenderness found. Achilles tendon exhibits no pain and normal Canales's test results.        Right lower leg: He exhibits no tenderness, no bony tenderness, no swelling, no edema and no deformity.        Left lower leg: He exhibits no tenderness, no swelling and no edema.        Right foot: There is normal range of motion and no deformity.        Left foot: There is normal range of motion and no deformity.   Adequate joint ROM noted to all lower extremity muscle groups with no pain or crepitation noted. Muscle strength is 5/5 in all groups bilaterally.     Feet:   Right Foot:   Protective Sensation: 5 sites tested. 5 sites sensed.   Left Foot:   Protective Sensation: 5 sites tested. 5 sites sensed.   Neurological: He is alert and oriented to person, place, and time.   Gross sensation intact to b/L lower extremities   Skin: Skin is warm. Capillary refill takes more than 3 seconds. No abrasion, no bruising, no burn and no ecchymosis noted.   No open lesions noted to b/L lower extremities.   Diffuse xerosis noted to b/L plantar feet especially heels.   Webspaces 1-4 b/L clean, dry, and intact.  Skin temp is warm to warm from proximal to distal b/L.         Psychiatric: He has a normal mood and affect. His speech is normal and behavior is  normal. He is attentive.             Assessment:       Encounter Diagnoses   Name Primary?    Type 2 diabetes mellitus without complication, without long-term current use of insulin Yes    Dry skin          Plan:       Jl was seen today for diabetes mellitus and diabetic foot exam.    Diagnoses and all orders for this visit:    Type 2 diabetes mellitus without complication, without long-term current use of insulin    Dry skin  -     ammonium lactate 12 % Crea; Apply small amount to feet except for in between toes twice a day      I counseled the patient on his conditions, their implications and medical management.        - Shoe inspection. Diabetic Foot Education. Patient reminded of the importance of good nutrition and blood sugar control to help prevent podiatric complications of diabetes. Patient instructed on proper foot hygeine. We discussed wearing proper shoe gear, daily foot inspections, never walking without protective shoe gear, never putting sharp instruments to feet.   Rx for reyes lactate for dry skin  RTC in 1 year or sooner if any new pedal problems should arise.

## 2019-01-12 DIAGNOSIS — F41.1 GAD (GENERALIZED ANXIETY DISORDER): ICD-10-CM

## 2019-01-12 RX ORDER — BUSPIRONE HYDROCHLORIDE 15 MG/1
TABLET ORAL
Qty: 60 TABLET | Refills: 5 | Status: SHIPPED | OUTPATIENT
Start: 2019-01-12 | End: 2019-07-03 | Stop reason: SDUPTHER

## 2019-01-30 ENCOUNTER — PATIENT MESSAGE (OUTPATIENT)
Dept: INTERNAL MEDICINE | Facility: CLINIC | Age: 49
End: 2019-01-30

## 2019-02-01 ENCOUNTER — OFFICE VISIT (OUTPATIENT)
Dept: OPTOMETRY | Facility: CLINIC | Age: 49
End: 2019-02-01
Payer: COMMERCIAL

## 2019-02-01 DIAGNOSIS — E11.9 DIABETIC EYE EXAM: Primary | ICD-10-CM

## 2019-02-01 DIAGNOSIS — H52.223 REGULAR ASTIGMATISM OF BOTH EYES: ICD-10-CM

## 2019-02-01 DIAGNOSIS — E11.9 TYPE 2 DIABETES MELLITUS WITHOUT RETINOPATHY: ICD-10-CM

## 2019-02-01 DIAGNOSIS — Z13.5 SCREENING FOR EYE CONDITION: ICD-10-CM

## 2019-02-01 DIAGNOSIS — H52.4 PRESBYOPIA OF BOTH EYES: ICD-10-CM

## 2019-02-01 DIAGNOSIS — Z01.00 DIABETIC EYE EXAM: Primary | ICD-10-CM

## 2019-02-01 DIAGNOSIS — I10 ESSENTIAL HYPERTENSION: ICD-10-CM

## 2019-02-01 PROCEDURE — 92015 PR REFRACTION: ICD-10-PCS | Mod: S$GLB,,, | Performed by: OPTOMETRIST

## 2019-02-01 PROCEDURE — 99999 PR PBB SHADOW E&M-EST. PATIENT-LVL III: CPT | Mod: PBBFAC,,, | Performed by: OPTOMETRIST

## 2019-02-01 PROCEDURE — 92014 COMPRE OPH EXAM EST PT 1/>: CPT | Mod: S$GLB,,, | Performed by: OPTOMETRIST

## 2019-02-01 PROCEDURE — 99999 PR PBB SHADOW E&M-EST. PATIENT-LVL III: ICD-10-PCS | Mod: PBBFAC,,, | Performed by: OPTOMETRIST

## 2019-02-01 PROCEDURE — 92015 DETERMINE REFRACTIVE STATE: CPT | Mod: S$GLB,,, | Performed by: OPTOMETRIST

## 2019-02-01 PROCEDURE — 92014 PR EYE EXAM, EST PATIENT,COMPREHESV: ICD-10-PCS | Mod: S$GLB,,, | Performed by: OPTOMETRIST

## 2019-02-01 NOTE — PROGRESS NOTES
"HPI     Concerns About Ocular Health      Additional comments: Recent "diabetic eye blurring" episode.  Blood glucose levels were out of control.  Now "completely managed" - now notes some difficulty with near.  Distance   VA with glasses okay.               Comments     Patient's age: 48 y.o. WM  Approximate date of last eye examination:  11/20/2017  Name of last eye doctor seen:  Dr Fine  City/State:  [facility]  Wears glasses? Yes     If yes, wears  full time  Present glasses are bifocal / S V Distance / S V Reading: Progressive   Approximate age of present glasses:  1+ years   Got new glasses following last exam, or subsequently?: yes   Any problem with VA with glasses?  As noted above  Wears CLs?:  No  Headaches? No  Eye pain/discomfort?  No                                                                                       Flashes?  No  Floaters?  No but has noted two in the past year  Diplopia/Double vision?  No  Patient's Ocular History:         Any eye surgeries? No         Any eye injury?  No         Any treatment for eye disease?  No  Family history of eye disease?  No  Significant patient medical history:         1. Diabetes?  Yes        If yes, IDDM or NIDDM? Currently taking insulin and Metformin    2. HBP?  Yes manage with medication and diet              3. Other (describe):  None   ! OTC eyedrops currently using:  No   ! Prescription eye meds currently using:  No   ! Any history of allergy/adverse reaction to any eye meds used   previously?  No    ! Any history of allergy/adverse reaction to eyedrops used during prior   eye exam(s)? No    ! Any history of allergy/adverse reaction to Novacaine or similar meds?   No   ! Any history of allergy/adverse reaction to Epinephrine or similar meds?   No    ! Patient okay with use of anesthetic eyedrops to check eye pressure?    Yes        ! Patient okay with use of eyedrops to dilate pupils today?  Yes    !  Allergies/Medications/Medical History/Family " "History reviewed today?    Yes        PD =   72/68  Desired reading distance =  16"  Computer distance = 23"                                                                       Last edited by Noman Fine, OD on 2/1/2019  8:15 AM. (History)            Assessment /Plan     For exam results, see Encounter Report.    1. Diabetic eye exam     2. Type 2 diabetes mellitus without retinopathy     3. Regular astigmatism of both eyes     4. Presbyopia of both eyes     5. Essential hypertension     6. Screening for eye condition                  Good ocular health in both eyes.  Type 2 (currently insulin-dependant) type 2 diabetes.   No evidence of diabetic retinal changes in either eye.     Astigmatic refractive error in each eye, with good best - correctable visual acuity in each eye.  Presbyopia consistent with age.  New spectacle lens Rx issued for use as desired.   Recommend full-time wear.  Repeat general eye examination and refraction in one year.            "

## 2019-02-01 NOTE — PATIENT INSTRUCTIONS
Good ocular health in both eyes.  Type 2 (currently insulin-dependant) type 2 diabetes.   No evidence of diabetic retinal changes in either eye.     Astigmatic refractive error in each eye, with good best - correctable visual acuity in each eye.  Presbyopia consistent with age.  New spectacle lens Rx issued for use as desired.   Recommend full-time wear.  Repeat general eye examination and refraction in one year.

## 2019-02-12 DIAGNOSIS — E11.65 UNCONTROLLED TYPE 2 DIABETES MELLITUS WITH HYPERGLYCEMIA, WITHOUT LONG-TERM CURRENT USE OF INSULIN: ICD-10-CM

## 2019-02-12 DIAGNOSIS — I10 ESSENTIAL HYPERTENSION: ICD-10-CM

## 2019-02-12 RX ORDER — HYDROCHLOROTHIAZIDE 25 MG/1
TABLET ORAL
Qty: 30 TABLET | Refills: 5 | OUTPATIENT
Start: 2019-02-12

## 2019-02-12 RX ORDER — INSULIN GLARGINE 100 [IU]/ML
10 INJECTION, SOLUTION SUBCUTANEOUS NIGHTLY
Qty: 1 BOX | Refills: 1 | OUTPATIENT
Start: 2019-02-12

## 2019-02-13 ENCOUNTER — PATIENT MESSAGE (OUTPATIENT)
Dept: INTERNAL MEDICINE | Facility: CLINIC | Age: 49
End: 2019-02-13

## 2019-03-09 DIAGNOSIS — K51.90 ULCERATIVE COLITIS WITHOUT COMPLICATIONS, UNSPECIFIED LOCATION: ICD-10-CM

## 2019-03-10 RX ORDER — MESALAMINE 0.38 G/1
CAPSULE, EXTENDED RELEASE ORAL
Qty: 120 CAPSULE | Refills: 0 | Status: SHIPPED | OUTPATIENT
Start: 2019-03-10 | End: 2019-04-08 | Stop reason: SDUPTHER

## 2019-03-14 ENCOUNTER — OFFICE VISIT (OUTPATIENT)
Dept: INTERNAL MEDICINE | Facility: CLINIC | Age: 49
End: 2019-03-14
Payer: COMMERCIAL

## 2019-03-14 VITALS
BODY MASS INDEX: 35.98 KG/M2 | HEART RATE: 78 BPM | TEMPERATURE: 98 F | WEIGHT: 251.31 LBS | DIASTOLIC BLOOD PRESSURE: 82 MMHG | HEIGHT: 70 IN | SYSTOLIC BLOOD PRESSURE: 114 MMHG

## 2019-03-14 DIAGNOSIS — F41.1 GAD (GENERALIZED ANXIETY DISORDER): ICD-10-CM

## 2019-03-14 DIAGNOSIS — K51.90 ULCERATIVE COLITIS WITHOUT COMPLICATIONS, UNSPECIFIED LOCATION: ICD-10-CM

## 2019-03-14 DIAGNOSIS — E11.9 TYPE 2 DIABETES MELLITUS WITHOUT COMPLICATION, WITHOUT LONG-TERM CURRENT USE OF INSULIN: Primary | ICD-10-CM

## 2019-03-14 DIAGNOSIS — E78.5 HYPERLIPIDEMIA, UNSPECIFIED HYPERLIPIDEMIA TYPE: ICD-10-CM

## 2019-03-14 DIAGNOSIS — N52.9 ERECTILE DYSFUNCTION, UNSPECIFIED ERECTILE DYSFUNCTION TYPE: ICD-10-CM

## 2019-03-14 DIAGNOSIS — E66.01 SEVERE OBESITY (BMI 35.0-39.9) WITH COMORBIDITY: ICD-10-CM

## 2019-03-14 DIAGNOSIS — I10 ESSENTIAL HYPERTENSION: ICD-10-CM

## 2019-03-14 PROCEDURE — 3008F PR BODY MASS INDEX (BMI) DOCUMENTED: ICD-10-PCS | Mod: CPTII,S$GLB,, | Performed by: INTERNAL MEDICINE

## 2019-03-14 PROCEDURE — 3074F PR MOST RECENT SYSTOLIC BLOOD PRESSURE < 130 MM HG: ICD-10-PCS | Mod: CPTII,S$GLB,, | Performed by: INTERNAL MEDICINE

## 2019-03-14 PROCEDURE — 3074F SYST BP LT 130 MM HG: CPT | Mod: CPTII,S$GLB,, | Performed by: INTERNAL MEDICINE

## 2019-03-14 PROCEDURE — 3079F DIAST BP 80-89 MM HG: CPT | Mod: CPTII,S$GLB,, | Performed by: INTERNAL MEDICINE

## 2019-03-14 PROCEDURE — 99999 PR PBB SHADOW E&M-EST. PATIENT-LVL III: CPT | Mod: PBBFAC,,, | Performed by: INTERNAL MEDICINE

## 2019-03-14 PROCEDURE — 3008F BODY MASS INDEX DOCD: CPT | Mod: CPTII,S$GLB,, | Performed by: INTERNAL MEDICINE

## 2019-03-14 PROCEDURE — 99999 PR PBB SHADOW E&M-EST. PATIENT-LVL III: ICD-10-PCS | Mod: PBBFAC,,, | Performed by: INTERNAL MEDICINE

## 2019-03-14 PROCEDURE — 3046F HEMOGLOBIN A1C LEVEL >9.0%: CPT | Mod: CPTII,S$GLB,, | Performed by: INTERNAL MEDICINE

## 2019-03-14 PROCEDURE — 3046F PR MOST RECENT HEMOGLOBIN A1C LEVEL > 9.0%: ICD-10-PCS | Mod: CPTII,S$GLB,, | Performed by: INTERNAL MEDICINE

## 2019-03-14 PROCEDURE — 99214 OFFICE O/P EST MOD 30 MIN: CPT | Mod: S$GLB,,, | Performed by: INTERNAL MEDICINE

## 2019-03-14 PROCEDURE — 3079F PR MOST RECENT DIASTOLIC BLOOD PRESSURE 80-89 MM HG: ICD-10-PCS | Mod: CPTII,S$GLB,, | Performed by: INTERNAL MEDICINE

## 2019-03-14 PROCEDURE — 99214 PR OFFICE/OUTPT VISIT, EST, LEVL IV, 30-39 MIN: ICD-10-PCS | Mod: S$GLB,,, | Performed by: INTERNAL MEDICINE

## 2019-03-14 RX ORDER — SILDENAFIL 50 MG/1
50 TABLET, FILM COATED ORAL DAILY PRN
Qty: 6 TABLET | Refills: 5 | Status: SHIPPED | OUTPATIENT
Start: 2019-03-14 | End: 2020-12-23

## 2019-03-14 NOTE — PROGRESS NOTES
Subjective:       Patient ID: Jl Araiza is a 49 y.o. male.    Chief Complaint: Diabetes    Last seen 3 months ago. Diabetes was out of control off Metformin, HbA1c 12.3%. Resumed Metformin ER at 1,000 mg daily and started Basal Insulin with Lantus 10 units nightly. Also started Pravastatin 20mg for , and increased Losartan from 25 to 50mg daily for /100 and microalbuminuria. Returns for f/u having been compliant with meds as prescribed, but stopped taking Lantus a few weeks ago prior to traveling out of the country out of concern that his glucose was getting low. Log of multiple daily readings reviewed today, Glucose has been consistently 130 or less in recent weeks, and was frequently below 100 two weeks ago with some LO readings when he decided to decrease the Metformin to 500mg daily. He is working hard at diet and exercising and is losing weight. Home BP consistently 120 or less systolic. Feeling well aside from recent URI which is resolving, and non-traumatic low back pain on left side - sacroiliac area, with no radicular symptoms, no bowel or bladder symptoms.     PMH:   Hypertension.   Diabetes type 2 diagnosed 10/17.  Hyperlipidemia.   Ulcerative Colitis, last seen by GI summer 2017.  Iron Def Anemia  Allergic Rhinitis  Depression/Anxiety, last seen by Psychiatry 12/17.  Obesity.   Eye exam 2/19. Podiatry 12/18. PSA normal 1.1 Oct. '17. BMD normal 6/17. Labs 12/18: CBC normal, CMP normal except Glucose 330, TBili 1.6, ALT 52, HbA1c 12.3%, ESR 11, CRP 4.0, TChol 224, , HDL 41, , Urine Microalbumin 54.     PSH: Umbilical Hernia Repair. EGD 6/16. Colonoscopy 8/17.     Social: Non-smoker, rare alcohol.  with a son. .     Hep B vaccine 2015, Prevnar 4/15. Pneumovax 11/15. Tdap 11/15. Flu shot 12/18.    NKDA.    Medications: list reviewed and reconciled.                    Review of Systems   Constitutional: Negative for activity change and unexpected weight  "change.   HENT: Positive for rhinorrhea. Negative for hearing loss and trouble swallowing.    Eyes: Negative for discharge and visual disturbance.   Respiratory: Negative for cough, chest tightness, shortness of breath and wheezing.    Cardiovascular: Negative for chest pain, palpitations and leg swelling.   Gastrointestinal: Negative for blood in stool, constipation, diarrhea and vomiting.   Endocrine: Negative for polydipsia and polyuria.   Genitourinary: Negative for difficulty urinating, hematuria and urgency.        C/O Erectile dysfunction, no psychological factors.    Musculoskeletal: Positive for back pain. Negative for arthralgias, gait problem, joint swelling and neck pain.   Skin: Negative for rash and wound.   Neurological: Negative for weakness and headaches.   Psychiatric/Behavioral: Negative for confusion and dysphoric mood. The patient is not nervous/anxious.        Objective:    /82, Pulse 78, Ht 5' 10", Wt 251 lbs (from 258), BMI=36  Physical Exam   Constitutional: He is oriented to person, place, and time. He appears well-developed and well-nourished. No distress.   HENT:   Nose: Nose normal.   Mouth/Throat: Oropharynx is clear and moist.   Cardiovascular: Normal rate, regular rhythm and normal heart sounds.   Pulmonary/Chest: Effort normal and breath sounds normal. No respiratory distress. He has no wheezes. He has no rales.   Musculoskeletal: Normal range of motion. He exhibits no edema or tenderness.   Neurological: He is alert and oriented to person, place, and time. No cranial nerve deficit.   Skin: Skin is warm and dry.   Psychiatric: He has a normal mood and affect. His behavior is normal.       Assessment:       1. Type 2 diabetes mellitus without complication, without long-term current use of insulin    2. Essential hypertension    3. Ulcerative colitis without complications, unspecified location    4. MYAH (generalized anxiety disorder)        Plan:       Type 2 diabetes mellitus " without complication, without long-term current use of insulin  -     Comprehensive metabolic panel; Future; Expected date: 03/14/2019  -     Hemoglobin A1c; Future; Expected date: 03/14/2019    Essential hypertension well controlled, continue same.     Hyperlipidemia, unspecified hyperlipidemia type  -     Lipid panel; Future; Expected date: 03/14/2019    Ulcerative colitis without complications, unspecified location - Continues on Mesalamine, inflammatory markers normal, f/u with GI encouraged.     MYAH (generalized anxiety disorder) - stable on Buspar.     Erectile dysfunction, unspecified erectile dysfunction type  -     sildenafil (VIAGRA) 50 MG tablet; Take 1 tablet (50 mg total) by mouth daily as needed for Erectile Dysfunction.  Dispense: 6 tablet; Refill: 5    Severe obesity (BMI 35.0-39.9) with comorbidity - continue diet and exercise.

## 2019-03-15 ENCOUNTER — LAB VISIT (OUTPATIENT)
Dept: LAB | Facility: HOSPITAL | Age: 49
End: 2019-03-15
Attending: INTERNAL MEDICINE
Payer: COMMERCIAL

## 2019-03-15 DIAGNOSIS — E11.9 TYPE 2 DIABETES MELLITUS WITHOUT COMPLICATION, WITHOUT LONG-TERM CURRENT USE OF INSULIN: ICD-10-CM

## 2019-03-15 DIAGNOSIS — E78.5 HYPERLIPIDEMIA, UNSPECIFIED HYPERLIPIDEMIA TYPE: ICD-10-CM

## 2019-03-15 LAB
ALBUMIN SERPL BCP-MCNC: 3.7 G/DL
ALP SERPL-CCNC: 76 U/L
ALT SERPL W/O P-5'-P-CCNC: 26 U/L
ANION GAP SERPL CALC-SCNC: 11 MMOL/L
AST SERPL-CCNC: 22 U/L
BILIRUB SERPL-MCNC: 1.2 MG/DL
BUN SERPL-MCNC: 15 MG/DL
CALCIUM SERPL-MCNC: 10 MG/DL
CHLORIDE SERPL-SCNC: 103 MMOL/L
CHOLEST SERPL-MCNC: 172 MG/DL
CHOLEST/HDLC SERPL: 4.2 {RATIO}
CO2 SERPL-SCNC: 26 MMOL/L
CREAT SERPL-MCNC: 1 MG/DL
EST. GFR  (AFRICAN AMERICAN): >60 ML/MIN/1.73 M^2
EST. GFR  (NON AFRICAN AMERICAN): >60 ML/MIN/1.73 M^2
ESTIMATED AVG GLUCOSE: 143 MG/DL
GLUCOSE SERPL-MCNC: 108 MG/DL
HBA1C MFR BLD HPLC: 6.6 %
HDLC SERPL-MCNC: 41 MG/DL
HDLC SERPL: 23.8 %
LDLC SERPL CALC-MCNC: 112 MG/DL
NONHDLC SERPL-MCNC: 131 MG/DL
POTASSIUM SERPL-SCNC: 4 MMOL/L
PROT SERPL-MCNC: 7.5 G/DL
SODIUM SERPL-SCNC: 140 MMOL/L
TRIGL SERPL-MCNC: 95 MG/DL

## 2019-03-15 PROCEDURE — 80053 COMPREHEN METABOLIC PANEL: CPT

## 2019-03-15 PROCEDURE — 83036 HEMOGLOBIN GLYCOSYLATED A1C: CPT

## 2019-03-15 PROCEDURE — 80061 LIPID PANEL: CPT

## 2019-03-15 PROCEDURE — 36415 COLL VENOUS BLD VENIPUNCTURE: CPT

## 2019-03-17 ENCOUNTER — PATIENT MESSAGE (OUTPATIENT)
Dept: INTERNAL MEDICINE | Facility: CLINIC | Age: 49
End: 2019-03-17

## 2019-04-06 DIAGNOSIS — E11.65 TYPE 2 DIABETES MELLITUS WITH HYPERGLYCEMIA, WITHOUT LONG-TERM CURRENT USE OF INSULIN: ICD-10-CM

## 2019-04-06 DIAGNOSIS — E78.5 HYPERLIPIDEMIA, UNSPECIFIED HYPERLIPIDEMIA TYPE: ICD-10-CM

## 2019-04-07 RX ORDER — METFORMIN HYDROCHLORIDE 500 MG/1
TABLET, EXTENDED RELEASE ORAL
Qty: 60 TABLET | Refills: 5 | Status: SHIPPED | OUTPATIENT
Start: 2019-04-07 | End: 2019-11-25 | Stop reason: SDUPTHER

## 2019-04-07 RX ORDER — PRAVASTATIN SODIUM 20 MG/1
TABLET ORAL
Qty: 30 TABLET | Refills: 5 | Status: SHIPPED | OUTPATIENT
Start: 2019-04-07 | End: 2019-09-26 | Stop reason: SDUPTHER

## 2019-04-08 DIAGNOSIS — K51.90 ULCERATIVE COLITIS WITHOUT COMPLICATIONS, UNSPECIFIED LOCATION: ICD-10-CM

## 2019-04-08 RX ORDER — MESALAMINE 0.38 G/1
CAPSULE, EXTENDED RELEASE ORAL
Qty: 120 CAPSULE | Refills: 2 | Status: SHIPPED | OUTPATIENT
Start: 2019-04-08 | End: 2019-06-27 | Stop reason: SDUPTHER

## 2019-06-08 DIAGNOSIS — I10 ESSENTIAL HYPERTENSION: ICD-10-CM

## 2019-06-08 RX ORDER — LOSARTAN POTASSIUM 50 MG/1
TABLET ORAL
Qty: 30 TABLET | Refills: 6 | Status: SHIPPED | OUTPATIENT
Start: 2019-06-08 | End: 2020-01-24

## 2019-06-27 ENCOUNTER — OFFICE VISIT (OUTPATIENT)
Dept: GASTROENTEROLOGY | Facility: CLINIC | Age: 49
End: 2019-06-27
Payer: COMMERCIAL

## 2019-06-27 ENCOUNTER — LAB VISIT (OUTPATIENT)
Dept: LAB | Facility: HOSPITAL | Age: 49
End: 2019-06-27
Payer: COMMERCIAL

## 2019-06-27 VITALS
BODY MASS INDEX: 38.66 KG/M2 | HEART RATE: 85 BPM | HEIGHT: 69 IN | SYSTOLIC BLOOD PRESSURE: 141 MMHG | WEIGHT: 261 LBS | DIASTOLIC BLOOD PRESSURE: 94 MMHG

## 2019-06-27 DIAGNOSIS — K51.80 OTHER ULCERATIVE COLITIS WITHOUT COMPLICATION: Primary | ICD-10-CM

## 2019-06-27 DIAGNOSIS — K51.90 ULCERATIVE COLITIS WITHOUT COMPLICATIONS, UNSPECIFIED LOCATION: ICD-10-CM

## 2019-06-27 DIAGNOSIS — R17 SERUM TOTAL BILIRUBIN ELEVATED: ICD-10-CM

## 2019-06-27 DIAGNOSIS — K51.80 OTHER ULCERATIVE COLITIS WITHOUT COMPLICATION: ICD-10-CM

## 2019-06-27 LAB
ALBUMIN SERPL BCP-MCNC: 4.1 G/DL (ref 3.5–5.2)
ALP SERPL-CCNC: 81 U/L (ref 55–135)
ALT SERPL W/O P-5'-P-CCNC: 26 U/L (ref 10–44)
ANION GAP SERPL CALC-SCNC: 10 MMOL/L (ref 8–16)
AST SERPL-CCNC: 17 U/L (ref 10–40)
BASOPHILS # BLD AUTO: 0.03 K/UL (ref 0–0.2)
BASOPHILS NFR BLD: 0.3 % (ref 0–1.9)
BILIRUB SERPL-MCNC: 0.9 MG/DL (ref 0.1–1)
BUN SERPL-MCNC: 18 MG/DL (ref 6–20)
CALCIUM SERPL-MCNC: 9.8 MG/DL (ref 8.7–10.5)
CHLORIDE SERPL-SCNC: 103 MMOL/L (ref 95–110)
CO2 SERPL-SCNC: 24 MMOL/L (ref 23–29)
CREAT SERPL-MCNC: 0.9 MG/DL (ref 0.5–1.4)
DIFFERENTIAL METHOD: ABNORMAL
EOSINOPHIL # BLD AUTO: 0.1 K/UL (ref 0–0.5)
EOSINOPHIL NFR BLD: 1.2 % (ref 0–8)
ERYTHROCYTE [DISTWIDTH] IN BLOOD BY AUTOMATED COUNT: 13.6 % (ref 11.5–14.5)
EST. GFR  (AFRICAN AMERICAN): >60 ML/MIN/1.73 M^2
EST. GFR  (NON AFRICAN AMERICAN): >60 ML/MIN/1.73 M^2
GLUCOSE SERPL-MCNC: 97 MG/DL (ref 70–110)
HCT VFR BLD AUTO: 49.4 % (ref 40–54)
HGB BLD-MCNC: 16.2 G/DL (ref 14–18)
IMM GRANULOCYTES # BLD AUTO: 0.03 K/UL (ref 0–0.04)
IMM GRANULOCYTES NFR BLD AUTO: 0.3 % (ref 0–0.5)
LYMPHOCYTES # BLD AUTO: 2.2 K/UL (ref 1–4.8)
LYMPHOCYTES NFR BLD: 25.3 % (ref 18–48)
MCH RBC QN AUTO: 27.2 PG (ref 27–31)
MCHC RBC AUTO-ENTMCNC: 32.8 G/DL (ref 32–36)
MCV RBC AUTO: 83 FL (ref 82–98)
MONOCYTES # BLD AUTO: 0.7 K/UL (ref 0.3–1)
MONOCYTES NFR BLD: 8.2 % (ref 4–15)
NEUTROPHILS # BLD AUTO: 5.6 K/UL (ref 1.8–7.7)
NEUTROPHILS NFR BLD: 64.7 % (ref 38–73)
NRBC BLD-RTO: 0 /100 WBC
PLATELET # BLD AUTO: 308 K/UL (ref 150–350)
PMV BLD AUTO: 8.6 FL (ref 9.2–12.9)
POTASSIUM SERPL-SCNC: 3.8 MMOL/L (ref 3.5–5.1)
PROT SERPL-MCNC: 7.7 G/DL (ref 6–8.4)
RBC # BLD AUTO: 5.96 M/UL (ref 4.6–6.2)
SODIUM SERPL-SCNC: 137 MMOL/L (ref 136–145)
WBC # BLD AUTO: 8.68 K/UL (ref 3.9–12.7)

## 2019-06-27 PROCEDURE — 36415 COLL VENOUS BLD VENIPUNCTURE: CPT

## 2019-06-27 PROCEDURE — 99213 PR OFFICE/OUTPT VISIT, EST, LEVL III, 20-29 MIN: ICD-10-PCS | Mod: GC,S$GLB,, | Performed by: INTERNAL MEDICINE

## 2019-06-27 PROCEDURE — 99999 PR PBB SHADOW E&M-EST. PATIENT-LVL IV: CPT | Mod: PBBFAC,,, | Performed by: INTERNAL MEDICINE

## 2019-06-27 PROCEDURE — 86704 HEP B CORE ANTIBODY TOTAL: CPT

## 2019-06-27 PROCEDURE — 80053 COMPREHEN METABOLIC PANEL: CPT

## 2019-06-27 PROCEDURE — 3077F SYST BP >= 140 MM HG: CPT | Mod: CPTII,S$GLB,, | Performed by: INTERNAL MEDICINE

## 2019-06-27 PROCEDURE — 85025 COMPLETE CBC W/AUTO DIFF WBC: CPT

## 2019-06-27 PROCEDURE — 3080F PR MOST RECENT DIASTOLIC BLOOD PRESSURE >= 90 MM HG: ICD-10-PCS | Mod: CPTII,S$GLB,, | Performed by: INTERNAL MEDICINE

## 2019-06-27 PROCEDURE — 86706 HEP B SURFACE ANTIBODY: CPT

## 2019-06-27 PROCEDURE — 99999 PR PBB SHADOW E&M-EST. PATIENT-LVL IV: ICD-10-PCS | Mod: PBBFAC,,, | Performed by: INTERNAL MEDICINE

## 2019-06-27 PROCEDURE — 81479 UNLISTED MOLECULAR PATHOLOGY: CPT

## 2019-06-27 PROCEDURE — 3008F PR BODY MASS INDEX (BMI) DOCUMENTED: ICD-10-PCS | Mod: CPTII,S$GLB,, | Performed by: INTERNAL MEDICINE

## 2019-06-27 PROCEDURE — 99213 OFFICE O/P EST LOW 20 MIN: CPT | Mod: GC,S$GLB,, | Performed by: INTERNAL MEDICINE

## 2019-06-27 PROCEDURE — 87340 HEPATITIS B SURFACE AG IA: CPT

## 2019-06-27 PROCEDURE — 3008F BODY MASS INDEX DOCD: CPT | Mod: CPTII,S$GLB,, | Performed by: INTERNAL MEDICINE

## 2019-06-27 PROCEDURE — 3080F DIAST BP >= 90 MM HG: CPT | Mod: CPTII,S$GLB,, | Performed by: INTERNAL MEDICINE

## 2019-06-27 PROCEDURE — 3077F PR MOST RECENT SYSTOLIC BLOOD PRESSURE >= 140 MM HG: ICD-10-PCS | Mod: CPTII,S$GLB,, | Performed by: INTERNAL MEDICINE

## 2019-06-27 RX ORDER — MESALAMINE 0.38 G/1
CAPSULE, EXTENDED RELEASE ORAL
Qty: 120 CAPSULE | Refills: 5 | Status: SHIPPED | OUTPATIENT
Start: 2019-06-27 | End: 2020-01-26

## 2019-06-27 NOTE — PROGRESS NOTES
[facility]    Reason for visit: There were no encounter diagnoses.  Referring Provider/PCP: Corine Taylor MD    History of Present Illness:  Jl Araiza is a 49 y.o. male with a 2history of Ulcerative Colitis, T2DM, HLD who is presenting for follow-up evaluation of Ulcerative Colitis.    Jl Araiza is a 46 y.o. male with symptoms of bloody diarrhea in 2000 and colonoscopy in 10/2010 and 10/2014 consistent with active left sided ulcerative colitis. Initially diagnosis not discussed and pt was on on medications though ongoing symptoms. When Dr. Deleon met the patient in 4/2015 and started apriso 1.5 g/day with initial improvement and response but worsening symptoms after taking augmentin for ear infection in the summer 2015.  He then tried a course of canasa and rowasa but unable to retain these and they were ineffective.  Stool studies in 6/2016 negative for infection but 10 days of empiric oral vancomycin did improve symptoms with return of symptoms after discontinuing.  The patient then started on uceris foam for 6 weeks without improvement of symptoms.  We felt he needed induction with oral corticosteroids with plans to start uceris but this was not covered by insurance so we proceeded with oral prednisone 40 mg daily on 10/14/16. His symptoms improved thereafter and was able to maintained on apriso 1.5g daily. He was last seen in follow-up 6/27/17 at which time he was feeling well without issues and underwent follow-up colonoscopy  with follow-up colonoscopy 8/4/17 with mild patchy erythema of rectum and sigmoid colon (no active inflammation).    He reports he has been doing well since last appointment. He reports he did not follow-up as his primary care was providing his prescriptions and he was doing well. He notes he has been well without issues. He notes his UC is under good control currently. Denies any abdominal pain, nausea or vomiting. Endorses 1-2 soft BM per  day, non-bloody. Endorses compliance without missed doses of apriso 4 tab/day. Denies any recent steroids (only received steroids on one occasion in 2016) illness or antibiotics. Acknowledges the role stress plays in his disease and has been working to reduce stress - though notes some increased stress at work recently due to issues with one of his contracts. Has been exercising daily and endorses weight loss.    Denies NSAIDs or smoking. No drinking or drug use.  No new family history since last seen. No family history of CRC.    Pertinent Endoscopy/Imaging:  10/25/2010 Colonoscopy: continuous area of nonbleeding ulcerated mucosa with no stigmata of recent bleeding in the rectum, sigmoid, and descending colon; exam otherwise without abnormality (path-sigmoid: chronic active colitis; descending: mild chronic active colitis)  10/14/2014 Colonoscopy: a segmental area of mildly congested, erythematous, inflamed, and ulcerated mucosa found in the rectum, sigmoid, and descending; Pathology: TI, right colon, transverse-no active inflammation; left colon-chronic active colitis and crypt abscess formation; small foci suggestive of granuloma formation; rectum-chronic active colitis; no dysplasia  11/17/2015 Dexa: normal  6/15/2016 EGD: granular mucosa in the papilla; erythematous mucosa in the stomach; granular gastric mucosa; La Grade C reflus esophagitis; Pathology: major papilla-non-neoplastic gastric type mucosa with goblet cell metaplasia, no dysplasia; Stomach-no active inflammation; negative H. Pylori; no dysplasia; distal esophagus-no active inflammation, evidence of specialized epithelium, or dysplasia  6/15/2016 Colonoscopy: normal TI; inflammation characterized by congestion (edema), erythema, granularity, loss of vascularity, and mucous was found in a continuous and circumferential pattern from the anus to the descending colon; the proximal descending colon, mid descending colon, splenic flexure, transverse  colon, hepatic flexure, ascending colon, cecum, appendiceal orifice, IC valve were spared; Pathology: small bowel-no active inflammation, ulceration, or dysplasia; TI, cecum/ascending, transverse, descending-no active inflammation, ulceration, or dysplasia; sigmoid, rectum-marked ulceration and chronic active colitis present; no granulomata or dysplasia  6/16/2016 CXR: normal  8/4/17 Colonoscopy: mild patchy ertyhema of rectum and sigmoid colon (mild architectural distortion, no active inflammation or dysplasia). 3mm descending polyp.    Review of Systems:   Constitutional: no fever, chills or change in weight (weight loss, volitional)  Eyes: no visual changes. Sees eye doctor regularly.  ENT: no sore throat or dysphagia  Respiratory: no cough or shortness of breath   Cardiovascular: no chest pain or palpitations   Gastrointestinal: as per HPI  Hematologic/Lymphatic: no easy bruising or lymphadenopathy   Musculoskeletal: no arthralgias or myalgias   Neurological: no change in mental status  Behavioral/Psych: no change in mood    Medical History:  Past Medical History:   Diagnosis Date    Abdominal hernia     Anxiety     Essential hypertension 11/2/2015    Hyperlipidemia     Obese     UC (ulcerative colitis)        Past Surgical History:   Procedure Laterality Date    COLONOSCOPY N/A 8/4/2017    Performed by [facility] at [facility] ENDO (4TH FLR)    COLONOSCOPY N/A 6/15/2016    Performed by Edmund Marie MD at [facility] ENDO (4TH FLR)    COLONOSCOPY N/A 10/14/2014    Performed by CARMEN Zuniga MD at [facility] ENDO (4TH FLR)    ESOPHAGOGASTRODUODENOSCOPY      ESOPHAGOGASTRODUODENOSCOPY (EGD) N/A 6/15/2016    Performed by Edmund Marie MD at [facility] ENDO (4TH FLR)    HERNIA REPAIR  Umbilical Hernia    REPAIR, HERNIA, UMBILICAL, AGE 5 YEARS OR OLDER N/A 3/11/2014    Performed by Jake Rosario Jr., MD at [facility] OR 2ND FLR    REPAIR-HERNIA-INCISIONAL-INITIAL N/A 3/11/2014    Performed by Jake MONTGOMERY  Abraham Meadows MD at [facility] OR 2ND FLR    VASECTOMY         Family History   Problem Relation Age of Onset    Cancer Mother         Liver    Alcohol abuse Mother     Liver cancer Mother 54        ETOH liver disease    Cirrhosis Mother 54        ETOH liver disease    Alcohol abuse Father     Cushing syndrome Father     No Known Problems Sister     Stroke Paternal Grandfather     No Known Problems Maternal Aunt     Liver cancer Maternal Uncle     No Known Problems Paternal Aunt     No Known Problems Paternal Uncle     No Known Problems Maternal Grandmother     No Known Problems Maternal Grandfather     No Known Problems Paternal Grandmother     Amblyopia Neg Hx     Blindness Neg Hx     Cataracts Neg Hx     Diabetes Neg Hx     Glaucoma Neg Hx     Hypertension Neg Hx     Macular degeneration Neg Hx     Retinal detachment Neg Hx     Strabismus Neg Hx     Thyroid disease Neg Hx     Celiac disease Neg Hx     Colon cancer Neg Hx     Colon polyps Neg Hx     Crohn's disease Neg Hx     Esophageal cancer Neg Hx     Inflammatory bowel disease Neg Hx     Liver disease Neg Hx     Rectal cancer Neg Hx     Stomach cancer Neg Hx     Ulcerative colitis Neg Hx     Cystic fibrosis Neg Hx     Hemochromatosis Neg Hx     Irritable bowel syndrome Neg Hx     Harmeet's disease Neg Hx     Multiple sclerosis Neg Hx     Tuberculosis Neg Hx     Rheum arthritis Neg Hx     Scleroderma Neg Hx     Lymphoma Neg Hx        Social History     Socioeconomic History    Marital status:      Spouse name: Not on file    Number of children: Not on file    Years of education: Not on file    Highest education level: Not on file   Occupational History    Occupation:      Employer: [employer]   Social Needs    Financial resource strain: Not on file    Food insecurity:     Worry: Not on file     Inability: Not on file    Transportation needs:     Medical: Not on file     Non-medical: Not on file    Tobacco Use    Smoking status: Never Smoker    Smokeless tobacco: Never Used   Substance and Sexual Activity    Alcohol use: No     Comment: Infrequently drinks one beer.      Drug use: No    Sexual activity: Yes     Partners: Female     Comment:     Lifestyle    Physical activity:     Days per week: Not on file     Minutes per session: Not on file    Stress: Not on file   Relationships    Social connections:     Talks on phone: Not on file     Gets together: Not on file     Attends Jehovah's witness service: Not on file     Active member of club or organization: Not on file     Attends meetings of clubs or organizations: Not on file     Relationship status: Not on file   Other Topics Concern    Patient feels they ought to cut down on drinking/drug use Not Asked    Patient annoyed by others criticizing their drinking/drug use Not Asked    Patient has felt bad or guilty about drinking/drug use Not Asked    Patient has had a drink/used drugs as an eye opener in the AM Not Asked   Social History Narrative           Current Outpatient Medications on File Prior to Visit   Medication Sig Dispense Refill    ammonium lactate 12 % Crea Apply small amount to feet except for in between toes twice a day 1 Tube 3    blood sugar diagnostic Strp 1 each by Misc.(Non-Drug; Combo Route) route once daily. 50 each 11    busPIRone (BUSPAR) 15 MG tablet TAKE 1 TABLET(15 MG) BY MOUTH TWICE DAILY 60 tablet 5    flash glucose scanning reader (FREESTYLE AMANDEEP 14 DAY READER) Misc 1 each by Misc.(Non-Drug; Combo Route) route once daily. 1 each 0    flash glucose sensor (FREESTYLE AMANDEEP 14 DAY SENSOR) Kit 1 each by Misc.(Non-Drug; Combo Route) route every 14 (fourteen) days. 2 kit 11    FREESTYLE LANCETS 28 gauge lancets TEST ONCE D  3    hydroCHLOROthiazide (HYDRODIURIL) 25 MG tablet TAKE 1 TABLET(25 MG) BY MOUTH EVERY DAY FOR HIGH BLOOD PRESSURE 30 tablet 5    losartan (COZAAR) 50 MG tablet TAKE 1 TABLET BY  "MOUTH ONCE DAILY 30 tablet 6    mesalamine (APRISO) 0.375 gram Cp24 TAKE 4 CAPSULES(1.5 GRAMS) BY MOUTH EVERY  capsule 2    metFORMIN (GLUCOPHAGE-XR) 500 MG 24 hr tablet TAKE TWO TABLETS BY MOUTH ONCE DAILY 60 tablet 5    pen needle, diabetic 32 gauge x 5/16" Ndle 1 Device by Misc.(Non-Drug; Combo Route) route once daily. 50 each 3    pravastatin (PRAVACHOL) 20 MG tablet TAKE 1 TABLET(20 MG) BY MOUTH EVERY DAY FOR CHOLESTEROL 30 tablet 5    sildenafil (VIAGRA) 50 MG tablet Take 1 tablet (50 mg total) by mouth daily as needed for Erectile Dysfunction. 6 tablet 5     No current facility-administered medications on file prior to visit.        Review of patient's allergies indicates:   Allergen Reactions    Cat/feline products Anaphylaxis     Difficulty breathing    Cigarette smoke Shortness Of Breath     Difficulty breathing       Physical Exam:  General: Alert and Oriented x3, no distress. Well dressed and groomed. Appears happy and in no distress.  There were no vitals filed for this visit.  HEENT: Normocephalic, Atraumatic. No scleral icterus.  Lymph: No cervical lymphadenopathy  Resp: Good air entry bilaterally, no adventitious sounds.  Cardiac: S1 and S2 normal.  Abdomen: obese abdomen. Normoactive bowel sounds. Non-distended. Normal tympany. Soft. Non-tender. No peritoneal signs.  Extremities: No peripheral edema. Normal bilateral pedal and radial pulses.  Neurologic: No gross neurological Deficits  Psych: Calm, cooperative. Normal mood and affect.    Laboratory:  Lab Results   Component Value Date     03/15/2019    K 4.0 03/15/2019     03/15/2019    CO2 26 03/15/2019    BUN 15 03/15/2019    CREATININE 1.0 03/15/2019    CALCIUM 10.0 03/15/2019    ANIONGAP 11 03/15/2019    ESTGFRAFRICA >60 03/15/2019    EGFRNONAA >60 03/15/2019       Lab Results   Component Value Date    ALT 26 03/15/2019    AST 22 03/15/2019    ALKPHOS 76 03/15/2019    BILITOT 1.2 (H) 03/15/2019       Lab Results "   Component Value Date    WBC 5.70 12/14/2018    HGB 16.4 12/14/2018    HCT 47.9 12/14/2018    MCV 82 12/14/2018     12/14/2018       Microbiology:  No Pertinent Microbiology      Assessment:  Jl Araiza is a 49 y.o. male with history of left-sided ulcerative colitis on apriso 1.5g/day who is presenting for follow-up evaluation of Ulcerative colitis.    # Left-sided ulcerative colitis. Appears to be in clinical remission on apriso 1.5g daily but has not had colonoscopy since 2017.  - continue apriso 1.5g daily. Stressed importance of complete compliance with this medication. Reviewed risks of the medication.  - drug monitoring: UA and Cr yearly. Will update now.    # Prior elevated isolated Tbili. Likely secondary to Gilbert's syndrome  - update CMP  - check UDP Glucuronsyl transferase    # IBD health maintenance  Colorectal cancer risk:    Risks factors: symptoms since 2000, left sided colitis   - colonoscopy: every 1-2 years once in remission with surveillance biopsies  Opthamologic exam recommended yearly - pt diabetic. Discussed and patient uptodate.  Dermatologic exam recommended yearly. Discussed today.    Bone Health  - not on prednisone/steroids. Only completed one course of steroids (2016)  - Risk of osteopenia/osteoporosis:  Risks factors: none  - Vitamin D: will update today    Vaccines  - will refer to ID clinic for vaccine update  - will update HepB and Quant Gold    Deep Cook MD  Gastroenterology Fellow (PGY IV)  Phone: [phone]    No orders of the defined types were placed in this encounter.

## 2019-06-27 NOTE — PROGRESS NOTES
I was present with Deep Cook MD the fellow during the above evaluation, including history and exam.  I discussed the case with the fellow and agree with the findings and plan as documented in the fellow's note.

## 2019-06-27 NOTE — PATIENT INSTRUCTIONS
1. Lab work today: CBC, CMP, Urine, Hepatitis, Tb test  2. Repeat colonoscopy  3. Continue apriso  4. Infectious disease referral

## 2019-06-28 ENCOUNTER — TELEPHONE (OUTPATIENT)
Dept: GASTROENTEROLOGY | Facility: CLINIC | Age: 49
End: 2019-06-28

## 2019-06-28 DIAGNOSIS — K52.9 IBD (INFLAMMATORY BOWEL DISEASE): Primary | ICD-10-CM

## 2019-06-28 DIAGNOSIS — K51.919 ULCERATIVE COLITIS WITH COMPLICATION, UNSPECIFIED LOCATION: Primary | ICD-10-CM

## 2019-06-28 LAB
HBV CORE AB SERPL QL IA: NEGATIVE
HBV SURFACE AB SER-ACNC: NEGATIVE M[IU]/ML
HBV SURFACE AG SERPL QL IA: NEGATIVE

## 2019-06-28 NOTE — TELEPHONE ENCOUNTER
----- Message from Deep Cook MD sent at 6/27/2019  6:03 PM CDT -----  Hi,  Please let Mr Araiza know his blood count, electrolytes, renal function and liver function are normal.  Thanks,  Dr. Cook

## 2019-07-01 ENCOUNTER — TELEPHONE (OUTPATIENT)
Dept: GASTROENTEROLOGY | Facility: CLINIC | Age: 49
End: 2019-07-01

## 2019-07-01 DIAGNOSIS — Z12.11 SPECIAL SCREENING FOR MALIGNANT NEOPLASMS, COLON: Primary | ICD-10-CM

## 2019-07-01 RX ORDER — POLYETHYLENE GLYCOL 3350, SODIUM SULFATE ANHYDROUS, SODIUM BICARBONATE, SODIUM CHLORIDE, POTASSIUM CHLORIDE 236; 22.74; 6.74; 5.86; 2.97 G/4L; G/4L; G/4L; G/4L; G/4L
4 POWDER, FOR SOLUTION ORAL ONCE
Qty: 4000 ML | Refills: 0 | Status: SHIPPED | OUTPATIENT
Start: 2019-07-01 | End: 2019-07-01

## 2019-07-01 NOTE — TELEPHONE ENCOUNTER
Ma spoke with pt , test results given pt verbalized understanding  Pt states he will  urine kit later

## 2019-07-01 NOTE — TELEPHONE ENCOUNTER
Dr. Marie,  Patient states he had this hep b vaccinations done 5 years ago and once to know how often this needs to be done   Please advise

## 2019-07-01 NOTE — TELEPHONE ENCOUNTER
----- Message from Deep Cook MD sent at 7/1/2019  9:56 AM CDT -----  Hi,    Please let Jl know his Tb test was negative.    Thanks,  Dr. Cook

## 2019-07-01 NOTE — TELEPHONE ENCOUNTER
----- Message from Deep Cook MD sent at 6/28/2019  5:05 PM CDT -----  Done. Sent the encounter to you. Thanks!  ----- Message -----  From: Melody Baeza MA  Sent: 6/28/2019   3:40 PM  To: Deep Cook MD    Can you place the orders for the urine to be home collect so it will show up under orders  ----- Message -----  From: Deep Cook MD  Sent: 6/27/2019   6:04 PM  To: Frank OBREGON Staff    Hi,    Can you see if you can add urine micro or change urinalysis to urinalysis with microscopy to the urinalysis order I placed today?    He has not yet provided sample according to epic.    Thanks,  Grover

## 2019-07-03 ENCOUNTER — TELEPHONE (OUTPATIENT)
Dept: GASTROENTEROLOGY | Facility: CLINIC | Age: 49
End: 2019-07-03

## 2019-07-03 DIAGNOSIS — F41.1 GAD (GENERALIZED ANXIETY DISORDER): ICD-10-CM

## 2019-07-03 LAB
MOL DX INTERP BLD/T QL: NORMAL
REF LAB TEST METHOD: NORMAL
TEST PERFORMANCE INFO SPEC: NORMAL
UGT1A1 ADDITIONAL INFORMATION: NORMAL
UGT1A1 FULL GENE SEQUENCE RESULT: NORMAL
UGT1A1 INTERPRETATION: NORMAL
UGT1A1 REVIEWED BY: NORMAL
UGT1A1 TA REPEAT RESULT: NORMAL

## 2019-07-03 RX ORDER — BUSPIRONE HYDROCHLORIDE 15 MG/1
TABLET ORAL
Qty: 60 TABLET | Refills: 5 | Status: SHIPPED | OUTPATIENT
Start: 2019-07-03 | End: 2019-12-26

## 2019-07-03 NOTE — TELEPHONE ENCOUNTER
----- Message from Deep Cook MD sent at 7/3/2019 12:48 PM CDT -----  Hi,  Please send Mr Araiza uptodate article on Gilbert's Syndrome. I printed it out and placed it on your desk.  Thanks,  Dr. Cook

## 2019-07-24 ENCOUNTER — TELEPHONE (OUTPATIENT)
Dept: INFECTIOUS DISEASES | Facility: CLINIC | Age: 49
End: 2019-07-24

## 2019-07-24 ENCOUNTER — CLINICAL SUPPORT (OUTPATIENT)
Dept: INFECTIOUS DISEASES | Facility: CLINIC | Age: 49
End: 2019-07-24
Payer: COMMERCIAL

## 2019-07-24 DIAGNOSIS — Z23 NEED FOR IMMUNIZATION AGAINST VIRAL HEPATITIS: Primary | ICD-10-CM

## 2019-07-24 PROCEDURE — 90746 HEPATITIS B VACCINE ADULT IM: ICD-10-PCS | Mod: S$GLB,,, | Performed by: INTERNAL MEDICINE

## 2019-07-24 PROCEDURE — 90471 HEPATITIS B VACCINE ADULT IM: ICD-10-PCS | Mod: S$GLB,,, | Performed by: INTERNAL MEDICINE

## 2019-07-24 PROCEDURE — 90471 IMMUNIZATION ADMIN: CPT | Mod: S$GLB,,, | Performed by: INTERNAL MEDICINE

## 2019-07-24 PROCEDURE — 90746 HEPB VACCINE 3 DOSE ADULT IM: CPT | Mod: S$GLB,,, | Performed by: INTERNAL MEDICINE

## 2019-07-24 NOTE — TELEPHONE ENCOUNTER
Spoke w/ pt.  Pt without plans to start on steroids or immunomodulator at this time or near future.  Reports reason for visit is that Hep B sab no longer positive, and wanted more information on this.  Counseled patient.  Will give 2 doses of Heplisav and then reassess for seroconversion.

## 2019-07-25 ENCOUNTER — PATIENT MESSAGE (OUTPATIENT)
Dept: INFECTIOUS DISEASES | Facility: CLINIC | Age: 49
End: 2019-07-25

## 2019-07-26 ENCOUNTER — PATIENT MESSAGE (OUTPATIENT)
Dept: ENDOSCOPY | Facility: HOSPITAL | Age: 49
End: 2019-07-26

## 2019-07-29 DIAGNOSIS — Z12.11 SPECIAL SCREENING FOR MALIGNANT NEOPLASMS, COLON: Primary | ICD-10-CM

## 2019-07-29 RX ORDER — SODIUM, POTASSIUM,MAG SULFATES 17.5-3.13G
1 SOLUTION, RECONSTITUTED, ORAL ORAL ONCE
Qty: 1 BOTTLE | Refills: 0 | Status: SHIPPED | OUTPATIENT
Start: 2019-07-29 | End: 2019-07-29

## 2019-08-22 ENCOUNTER — HOSPITAL ENCOUNTER (OUTPATIENT)
Facility: HOSPITAL | Age: 49
Discharge: HOME | End: 2019-08-22
Attending: INTERNAL MEDICINE | Admitting: INTERNAL MEDICINE
Payer: COMMERCIAL

## 2019-08-22 ENCOUNTER — ANESTHESIA EVENT (OUTPATIENT)
Dept: ENDOSCOPY | Facility: HOSPITAL | Age: 49
End: 2019-08-22
Payer: COMMERCIAL

## 2019-08-22 ENCOUNTER — ANESTHESIA (OUTPATIENT)
Dept: ENDOSCOPY | Facility: HOSPITAL | Age: 49
End: 2019-08-22
Payer: COMMERCIAL

## 2019-08-22 VITALS
TEMPERATURE: 98 F | SYSTOLIC BLOOD PRESSURE: 120 MMHG | RESPIRATION RATE: 15 BRPM | HEIGHT: 69 IN | OXYGEN SATURATION: 95 % | DIASTOLIC BLOOD PRESSURE: 80 MMHG | HEART RATE: 64 BPM | WEIGHT: 255 LBS | BODY MASS INDEX: 37.77 KG/M2

## 2019-08-22 DIAGNOSIS — K51.80 OTHER ULCERATIVE COLITIS WITHOUT COMPLICATION: ICD-10-CM

## 2019-08-22 LAB — POCT GLUCOSE: 100 MG/DL (ref 70–110)

## 2019-08-22 PROCEDURE — 45380 COLONOSCOPY AND BIOPSY: CPT | Mod: 59,,, | Performed by: INTERNAL MEDICINE

## 2019-08-22 PROCEDURE — 37000008 HC ANESTHESIA 1ST 15 MINUTES: Performed by: INTERNAL MEDICINE

## 2019-08-22 PROCEDURE — 82962 GLUCOSE BLOOD TEST: CPT | Performed by: INTERNAL MEDICINE

## 2019-08-22 PROCEDURE — 45385 PR COLONOSCOPY,REMV LESN,SNARE: ICD-10-PCS | Mod: 22,33,, | Performed by: INTERNAL MEDICINE

## 2019-08-22 PROCEDURE — 37000009 HC ANESTHESIA EA ADD 15 MINS: Performed by: INTERNAL MEDICINE

## 2019-08-22 PROCEDURE — 45380 PR COLONOSCOPY,BIOPSY: ICD-10-PCS | Mod: 59,,, | Performed by: INTERNAL MEDICINE

## 2019-08-22 PROCEDURE — E9220 PRA ENDO ANESTHESIA: HCPCS | Mod: 33,,, | Performed by: NURSE ANESTHETIST, CERTIFIED REGISTERED

## 2019-08-22 PROCEDURE — 45385 COLONOSCOPY W/LESION REMOVAL: CPT | Mod: 22,33,, | Performed by: INTERNAL MEDICINE

## 2019-08-22 PROCEDURE — 27201012 HC FORCEPS, HOT/COLD, DISP: Performed by: INTERNAL MEDICINE

## 2019-08-22 PROCEDURE — 27200997: Performed by: INTERNAL MEDICINE

## 2019-08-22 PROCEDURE — 45380 COLONOSCOPY AND BIOPSY: CPT | Performed by: INTERNAL MEDICINE

## 2019-08-22 PROCEDURE — 45385 COLONOSCOPY W/LESION REMOVAL: CPT | Performed by: INTERNAL MEDICINE

## 2019-08-22 PROCEDURE — 88305 TISSUE EXAM BY PATHOLOGIST: CPT | Mod: 26,,, | Performed by: PATHOLOGY

## 2019-08-22 PROCEDURE — 88305 TISSUE EXAM BY PATHOLOGIST: CPT | Performed by: PATHOLOGY

## 2019-08-22 PROCEDURE — E9220 PRA ENDO ANESTHESIA: ICD-10-PCS | Mod: 33,,, | Performed by: NURSE ANESTHETIST, CERTIFIED REGISTERED

## 2019-08-22 PROCEDURE — 63600175 PHARM REV CODE 636 W HCPCS: Performed by: NURSE ANESTHETIST, CERTIFIED REGISTERED

## 2019-08-22 PROCEDURE — 63600175 PHARM REV CODE 636 W HCPCS: Performed by: INTERNAL MEDICINE

## 2019-08-22 PROCEDURE — 27201089 HC SNARE, DISP (ANY): Performed by: INTERNAL MEDICINE

## 2019-08-22 PROCEDURE — 88305 TISSUE SPECIMEN TO PATHOLOGY - SURGERY: ICD-10-PCS | Mod: 26,,, | Performed by: PATHOLOGY

## 2019-08-22 RX ORDER — PROPOFOL 10 MG/ML
VIAL (ML) INTRAVENOUS
Status: DISCONTINUED | OUTPATIENT
Start: 2019-08-22 | End: 2019-08-22

## 2019-08-22 RX ORDER — SODIUM CHLORIDE 9 MG/ML
INJECTION, SOLUTION INTRAVENOUS CONTINUOUS
Status: DISCONTINUED | OUTPATIENT
Start: 2019-08-22 | End: 2019-08-22 | Stop reason: HOSPADM

## 2019-08-22 RX ORDER — LIDOCAINE HCL/PF 100 MG/5ML
SYRINGE (ML) INTRAVENOUS
Status: DISCONTINUED | OUTPATIENT
Start: 2019-08-22 | End: 2019-08-22

## 2019-08-22 RX ORDER — SODIUM CHLORIDE 0.9 % (FLUSH) 0.9 %
10 SYRINGE (ML) INJECTION
Status: DISCONTINUED | OUTPATIENT
Start: 2019-08-22 | End: 2019-08-22 | Stop reason: HOSPADM

## 2019-08-22 RX ORDER — PROPOFOL 10 MG/ML
VIAL (ML) INTRAVENOUS CONTINUOUS PRN
Status: DISCONTINUED | OUTPATIENT
Start: 2019-08-22 | End: 2019-08-22

## 2019-08-22 RX ADMIN — PROPOFOL 200 MCG/KG/MIN: 10 INJECTION, EMULSION INTRAVENOUS at 08:08

## 2019-08-22 RX ADMIN — PROPOFOL 100 MG: 10 INJECTION, EMULSION INTRAVENOUS at 08:08

## 2019-08-22 RX ADMIN — LIDOCAINE HYDROCHLORIDE 60 MG: 20 INJECTION, SOLUTION INTRAVENOUS at 08:08

## 2019-08-22 RX ADMIN — SODIUM CHLORIDE: 0.9 INJECTION, SOLUTION INTRAVENOUS at 08:08

## 2019-08-22 NOTE — TRANSFER OF CARE
"Anesthesia Transfer of Care Note    Patient: Jl Araiza    Procedure(s) Performed: Procedure(s) (LRB):  COLONOSCOPY (N/A)    Patient location: OPS    Anesthesia Type: general    Transport from OR: Transported from OR on room air with adequate spontaneous ventilation    Post pain: adequate analgesia    Post assessment: no apparent anesthetic complications and tolerated procedure well    Post vital signs: stable    Level of consciousness: awake and alert    Nausea/Vomiting: no nausea/vomiting    Complications: none          Last vitals:   Visit Vitals  /74 (BP Location: Left arm, Patient Position: Lying)   Pulse 70   Temp 36.5 °C (97.7 °F) (Temporal)   Resp 16   Ht 5' 9" (1.753 m)   Wt 115.7 kg (255 lb)   SpO2 95%   BMI 37.66 kg/m²     "

## 2019-08-22 NOTE — [employer] PATIENT INSTRUCTIONS
Discharge Summary/Instructions after an Endoscopic Procedure  Patient Name: Jl Robles  Patient MRN: [id number]  Patient YOB: 1970  Thursday, August 22, 2019  Edmund Marie MD  RESTRICTIONS:  During your procedure today, you received medications for sedation.  These   medications may affect your judgment, balance and coordination.  Therefore,   for 24 hours, you have the following restrictions:   - DO NOT drive a car, operate machinery, make legal/financial decisions,   sign important papers or drink alcohol.    ACTIVITY:  Today: no heavy lifting, straining or running due to procedural   sedation/anesthesia.  The following day: return to full activity including work.  DIET:  Eat and drink normally unless instructed otherwise.     TREATMENT FOR COMMON SIDE EFFECTS:  - Mild abdominal pain, nausea, belching, bloating or excessive gas:  rest,   eat lightly and use a heating pad.  - Sore Throat: treat with throat lozenges and/or gargle with warm salt   water.  - Because air was used during the procedure, expelling large amounts of air   from your rectum or belching is normal.  - If a bowel prep was taken, you may not have a bowel movement for 1-3 days.    This is normal.  SYMPTOMS TO WATCH FOR AND REPORT TO YOUR PHYSICIAN:  1. Abdominal pain or bloating, other than gas cramps.  2. Chest pain.  3. Back pain.  4. Signs of infection such as: chills or fever occurring within 24 hours   after the procedure.  5. Rectal bleeding, which would show as bright red, maroon, or black stools.   (A tablespoon of blood from the rectum is not serious, especially if   hemorrhoids are present.)  6. Vomiting.  7. Weakness or dizziness.  GO DIRECTLY TO THE NEAREST EMERGENCY ROOM IF YOU HAVE ANY OF THE FOLLOWING:      Difficulty breathing              Chills and/or fever over 101 F   Persistent vomiting and/or vomiting blood   Severe abdominal pain   Severe chest pain   Black, tarry stools   Bleeding- more than one  tablespoon   Any other symptom or condition that you feel may need urgent attention  Your doctor recommends these additional instructions:  If any biopsies were taken, your doctors clinic will contact you in 1 to 2   weeks with any results.  - Discharge patient to home.   - Resume previous diet.   - Continue present medications.   - Telephone GI clinic for pathology results in 2 weeks.   - No ibuprofen, naproxen, or other non-steroidal anti-inflammatory drugs   unless recommended by your physician.   - Patient has a contact number available for emergencies.  The signs and   symptoms of potential delayed complications were discussed with the   patient.  Return to normal activities tomorrow.  Written discharge   instructions were provided to the patient.   - Consider avoiding all non-steroidal anti-inflammatory drugs (aspirin,   ibuprofen, naproxen, etc.), unless needed for cardiovascular protection.    Recommend you discuss with your prescribing doctor (of your aspirin) to see   if cardiovascular benefits of your aspirin outweigh the risks of GI   bleeding.  - Repeat colonoscopy in 2 years for surveillance based on pathology results.     - The findings and recommendations were discussed with the patient.   - Return to GI clinic at the next available appointment.  For questions, problems or results please call your physician - Edmund Marie MD at Work:  [phone].  [facility], EMERGENCY ROOM PHONE NUMBER: [phone]  IF A COMPLICATION OR EMERGENCY SITUATION ARISES AND YOU ARE UNABLE TO REACH   YOUR PHYSICIAN - GO DIRECTLY TO THE EMERGENCY ROOM.  Edmund Marie MD  8/22/2019 10:14:56 AM  This report has been verified and signed electronically.  [employer]

## 2019-08-22 NOTE — ANESTHESIA PREPROCEDURE EVALUATION
08/22/2019  Jl Araiza is a 49 y.o., male.    Past Medical History:   Diagnosis Date    Abdominal hernia     Anxiety     Essential hypertension 11/2/2015    Hyperlipidemia     Obese     UC (ulcerative colitis)      Past Surgical History:   Procedure Laterality Date    COLONOSCOPY N/A 8/4/2017    Performed by [facility] at [facility] ENDO (4TH FLR)    COLONOSCOPY N/A 6/15/2016    Performed by Edmund [facility] MD at [facility] ENDO (4TH FLR)    COLONOSCOPY N/A 10/14/2014    Performed by CARMEN Zuniga MD at [facility] ENDO (4TH FLR)    ESOPHAGOGASTRODUODENOSCOPY      ESOPHAGOGASTRODUODENOSCOPY (EGD) N/A 6/15/2016    Performed by Edmund Marie MD at [facility] ENDO (4TH FLR)    HERNIA REPAIR  Umbilical Hernia    REPAIR, HERNIA, UMBILICAL, AGE 5 YEARS OR OLDER N/A 3/11/2014    Performed by Jake Rosario Jr., MD at [facility] OR 2ND FLR    REPAIR-HERNIA-INCISIONAL-INITIAL N/A 3/11/2014    Performed by Jake Rosario Jr., MD at [facility] OR 2ND FLR    VASECTOMY         Anesthesia Evaluation    I have reviewed the Patient Summary Reports.     I have reviewed the Medications.     Review of Systems  Anesthesia Hx:  No problems with previous Anesthesia    Social:  Non-Smoker, No Alcohol Use    Hematology/Oncology:  Hematology Normal   Oncology Normal     EENT/Dental:EENT/Dental Normal   Cardiovascular:   Hypertension    Pulmonary:  Pulmonary Normal    Renal/:  Renal/ Normal     Hepatic/GI:   PUD,    Musculoskeletal:  Musculoskeletal Normal    Neurological:  Neurology Normal    Endocrine:   Diabetes    Dermatological:  Skin Normal    Psych:   Psychiatric History          Physical Exam  General:  Obesity    Airway/Jaw/Neck:  Airway Findings: Mouth Opening: Normal Tongue: Normal  General Airway Assessment: Adult  Mallampati: III  Improves to III with phonation.  TM Distance: Normal, at least 6 cm       Dental:  Dental Findings: In tact   Chest/Lungs:  Chest/Lungs Findings: Clear to auscultation     Heart/Vascular:  Heart Findings: Rate: Normal  Rhythm: Regular Rhythm        Mental Status:  Mental Status Findings:  Cooperative, Alert and Oriented         Anesthesia Plan  Type of Anesthesia, risks & benefits discussed:  Anesthesia Type:  general  Patient's Preference: general  Intra-op Monitoring Plan: standard ASA monitors  Intra-op Monitoring Plan Comments:   Post Op Pain Control Plan: IV/PO Opioids PRN  Post Op Pain Control Plan Comments:   Induction:   IV  Beta Blocker:  Patient is not currently on a Beta-Blocker (No further documentation required).       Informed Consent: Patient understands risks and agrees with Anesthesia plan.  Questions answered. Anesthesia consent signed with patient.  ASA Score: 2     Day of Surgery Review of History & Physical:  There are no significant changes.          Ready For Surgery From Anesthesia Perspective.

## 2019-08-22 NOTE — ANESTHESIA POSTPROCEDURE EVALUATION
Anesthesia Post Evaluation    Patient: Jl Araiza    Procedure(s) Performed: Procedure(s) (LRB):  COLONOSCOPY (N/A)    Final Anesthesia Type: general  Patient location during evaluation: GI PACU  Patient participation: Yes- Able to Participate  Level of consciousness: awake and alert  Post-procedure vital signs: reviewed and stable  Pain management: adequate  Airway patency: patent  PONV status at discharge: No PONV  Anesthetic complications: no      Cardiovascular status: stable  Respiratory status: unassisted and spontaneous ventilation  Hydration status: euvolemic  Follow-up not needed.          Vitals Value Taken Time   /80 8/22/2019 10:16 AM   Temp 36.5 °C (97.7 °F) 8/22/2019  9:56 AM   Pulse 64 8/22/2019 10:16 AM   Resp 15 8/22/2019 10:16 AM   SpO2 95 % 8/22/2019 10:16 AM         Event Time     Out of Recovery 10:26:03          Pain/Barbra Score: Barbra Score: 10 (8/22/2019 10:16 AM)

## 2019-08-22 NOTE — DISCHARGE INSTRUCTIONS
Colonoscopy     A camera attached to a flexible tube with a viewing lens is used to take video pictures.     Colonoscopy is a test to view the inside of your lower digestive tract (colon and rectum). Sometimes it can show the last part of the small intestine (ileum). During the test, small pieces of tissue may be removed for testing. This is called a biopsy. Small growths, such as polyps, may also be removed.   Why is colonoscopy done?  The test is done to help look for colon cancer. And it can help find the source of abdominal pain, bleeding, and changes in bowel habits. It may be needed once a year, depending on factors such as your:  · Age  · Health history  · Family health history  · Symptoms  · Results from any prior colonoscopy  Risks and possible complications  These include:  · Bleeding               · A puncture or tear in the colon   · Risks of anesthesia  · A cancer lesion not being seen  Getting ready   To prepare for the test:  · Talk with your healthcare provider about the risks of the test (see below). Also ask your healthcare provider about alternatives to the test.  · Tell your healthcare provider about any medicines you take. Also tell him or her about any health conditions you may have.  · Make sure your rectum and colon are empty for the test. Follow the diet and bowel prep instructions exactly. If you dont, the test may need to be rescheduled.  · Plan for a friend or family member to drive you home after the test.     Colonoscopy provides an inside view of the entire colon.     You may discuss the results with your doctor right away or at a future visit.  During the test   The test is usually done in the hospital on an outpatient basis. This means you go home the same day. The procedure takes about 30 minutes. During that time:  · You are given relaxing (sedating) medicine through an IV line. You may be drowsy, or fully asleep.  · The healthcare provider will first give you a physical exam to  check for anal and rectal problems.  · Then the anus is lubricated and the scope inserted.  · If you are awake, you may have a feeling similar to needing to have a bowel movement. You may also feel pressure as air is pumped into the colon. Its OK to pass gas during the procedure.  · Biopsy, polyp removal, or other treatments may be done during the test.  After the test   You may have gas right after the test. It can help to try to pass it to help prevent later bloating. Your healthcare provider may discuss the results with you right away. Or you may need to schedule a follow-up visit to talk about the results. After the test, you can go back to your normal eating and other activities. You may be tired from the sedation and need to rest for a few hours.  Date Last Reviewed: 11/1/2016 © 2000-2017 [employer] [address] [place] [address], [place] [address]. All rights reserved. This information is not intended as a substitute for professional medical care. Always follow your healthcare professional's instructions.

## 2019-08-22 NOTE — H&P
Short Stay Endoscopy History and Physical    PCP - Corine Taylor MD    Procedure - Colonoscopy  ASA - per anesthesia  Mallampati - per anesthesia  History of Anesthesia problems - no  Family history Anesthesia problems - no   Plan of anesthesia - General    HPI:This is a 49 y.o. male here for evaluation of : ulcerative colitis.    UC onset 2000, diagnosed 2010.  - extent: left sided  - Rx: apriso    Last colon (8/2017) mild patchy erythema of rectum and sigmoid, no active inflammation on pathology.    Feels well, no new complaints since last seen in clinic. Endorses complete compliance with apriso. No abdominal pain, n/v/d. No bloody bm or melena. BM 1-2/day, no nocturnal BM.    No NSAIDs or anticoagulants.  No family history of CRC.    ROS:  Constitutional: No fevers, chills, No weight loss  CV: No chest pain  Pulm: No cough, No shortness of breath  GI: see HPI  Derm: No rash    Medical History:  has a past medical history of Abdominal hernia, Anxiety, Essential hypertension (11/2/2015), Hyperlipidemia, Obese, and UC (ulcerative colitis).    Surgical History:  has a past surgical history that includes Hernia repair (Umbilical Hernia); Colonoscopy (N/A, 6/15/2016); Esophagogastroduodenoscopy; Vasectomy; and Colonoscopy (N/A, 8/4/2017).    Family History: family history includes Alcohol abuse in his father and mother; Cancer in his mother; Cirrhosis (age of onset: 54) in his mother; Cushing syndrome in his father; Liver cancer in his maternal uncle; Liver cancer (age of onset: 54) in his mother; No Known Problems in his maternal aunt, maternal grandfather, maternal grandmother, paternal aunt, paternal grandmother, paternal uncle, and sister; Stroke in his paternal grandfather.. Otherwise no colon cancer, inflammatory bowel disease, or GI malignancies.    Social History:  reports that he has never smoked. He has never used smokeless tobacco. He reports that he does not drink alcohol or use drugs.    Review  of patient's allergies indicates:   Allergen Reactions    Cat/feline products Anaphylaxis     Difficulty breathing    Cigarette smoke Shortness Of Breath     Difficulty breathing       Medications:   Medications Prior to Admission   Medication Sig Dispense Refill Last Dose    busPIRone (BUSPAR) 15 MG tablet TAKE 1 TABLET(15 MG) BY MOUTH TWICE DAILY 60 tablet 5 8/21/2019 at Unknown time    hydroCHLOROthiazide (HYDRODIURIL) 25 MG tablet TAKE 1 TABLET(25 MG) BY MOUTH EVERY DAY FOR HIGH BLOOD PRESSURE 30 tablet 5 8/21/2019 at Unknown time    losartan (COZAAR) 50 MG tablet TAKE 1 TABLET BY MOUTH ONCE DAILY 30 tablet 6 8/21/2019 at Unknown time    mesalamine (APRISO) 0.375 gram Cp24 TAKE 4 CAPSULES(1.5 GRAMS) BY MOUTH EVERY  capsule 5 8/21/2019 at Unknown time    metFORMIN (GLUCOPHAGE-XR) 500 MG 24 hr tablet TAKE TWO TABLETS BY MOUTH ONCE DAILY 60 tablet 5 8/21/2019 at Unknown time    pravastatin (PRAVACHOL) 20 MG tablet TAKE 1 TABLET(20 MG) BY MOUTH EVERY DAY FOR CHOLESTEROL 30 tablet 5 8/21/2019 at Unknown time    flash glucose sensor (FREESTYLE AMANDEEP 14 DAY SENSOR) Kit 1 each by Misc.(Non-Drug; Combo Route) route every 14 (fourteen) days. 2 kit 11 Taking    sildenafil (VIAGRA) 50 MG tablet Take 1 tablet (50 mg total) by mouth daily as needed for Erectile Dysfunction. 6 tablet 5 Taking         Physical Exam:    Vital Signs: There were no vitals filed for this visit.    General Appearance: Well appearing in no acute distress  Eyes:    No scleral icterus  ENT: Neck supple, Lips, mucosa, and tongue normal; teeth and gums normal  Lungs: CTA bilaterally  Heart:  S1, S2 normal, no murmurs heard  Abdomen: Soft, non tender, non distended with positive bowel sounds. No hepatosplenomegaly, ascites, or mass.  Extremities: 2+ pulses, no clubbing, cyanosis or edema  Skin: No rash      Labs:  Lab Results   Component Value Date    WBC 8.68 06/27/2019    HGB 16.2 06/27/2019    HCT 49.4 06/27/2019     06/27/2019     CHOL 172 03/15/2019    TRIG 95 03/15/2019    HDL 41 03/15/2019    ALT 26 06/27/2019    AST 17 06/27/2019     06/27/2019    K 3.8 06/27/2019     06/27/2019    CREATININE 0.9 06/27/2019    BUN 18 06/27/2019    CO2 24 06/27/2019    TSH 1.726 09/03/2016    PSA 1.1 10/13/2017    HGBA1C 6.6 (H) 03/15/2019       I have explained the risks and benefits of endoscopy procedures to the patient including but not limited to bleeding, perforation, infection, and death.  The patient was asked if they understand and allowed to ask any further questions to their satisfaction.    Deep Cook MD

## 2019-08-27 ENCOUNTER — TELEPHONE (OUTPATIENT)
Dept: GASTROENTEROLOGY | Facility: CLINIC | Age: 49
End: 2019-08-27

## 2019-08-27 DIAGNOSIS — I10 ESSENTIAL HYPERTENSION: ICD-10-CM

## 2019-08-27 RX ORDER — HYDROCHLOROTHIAZIDE 25 MG/1
TABLET ORAL
Qty: 30 TABLET | Refills: 3 | Status: SHIPPED | OUTPATIENT
Start: 2019-08-27 | End: 2019-12-26

## 2019-08-27 NOTE — TELEPHONE ENCOUNTER
----- Message from Cortney Calvin MA sent at 8/27/2019  2:43 PM CDT -----      ----- Message -----  From: Deep Cook MD  Sent: 8/27/2019   1:49 PM  To: Edmund Marie MD, [facility] Staff    Please let Mr Araiza know his biopsies have returned. Everything looks good, no active inflammation. Polyps were benign. Continue with plan for 2 year follow-up colonoscopy. Continue your current dosage of apriso without any changes.    1. Colon, proximal 15 mm pedunculated polyp (polypectomy):  Polypoid colonic mucosa  Negative for dysplasia    2. Terminal ileum (biopsy):  Histologically normal small intestine mucosa    3. Colon, cecum and ascending (biopsy):  Histologically normal colonic mucosa  Negative for active inflammation  No granulomas  Negative for dysplasia    4. Colon, transverse (biopsies):  Histologically normal colonic mucosa  Negative for active inflammation  No granulomas  Negative for dysplasia    5. Colon, descending (biopsies):  Histologically normal colonic mucosa  Negative for active inflammation  No granulomas  Negative for dysplasia    6. Colon, sigmoid (biopsies):  Colonic mucosa with mild architectural distortion  Negative for active inflammation  No granulomas  Negative for dysplasia    7. Colon, sigmoid (biopsies):  Histologically normal colonic mucosa  Negative for active inflammation  No granulomas  Negative for dysplasia    8. Colon, sigmoid distal polyps (biopsy):  Inflammatory polyp  Chronic inactive inflammation  Negative for dysplasia    9. Colon, rectum (biopsy):  Hyperplastic colonic mucosa  Negative for dysplasia

## 2019-08-29 ENCOUNTER — TELEPHONE (OUTPATIENT)
Dept: ENDOSCOPY | Facility: HOSPITAL | Age: 49
End: 2019-08-29

## 2019-09-15 ENCOUNTER — PATIENT MESSAGE (OUTPATIENT)
Dept: GASTROENTEROLOGY | Facility: CLINIC | Age: 49
End: 2019-09-15

## 2019-09-26 DIAGNOSIS — E78.5 HYPERLIPIDEMIA, UNSPECIFIED HYPERLIPIDEMIA TYPE: ICD-10-CM

## 2019-09-26 RX ORDER — PRAVASTATIN SODIUM 20 MG/1
TABLET ORAL
Qty: 30 TABLET | Refills: 5 | Status: SHIPPED | OUTPATIENT
Start: 2019-09-26 | End: 2020-05-24

## 2019-11-25 ENCOUNTER — TELEPHONE (OUTPATIENT)
Dept: PRIMARY CARE CLINIC | Facility: CLINIC | Age: 49
End: 2019-11-25

## 2019-11-25 DIAGNOSIS — E11.65 TYPE 2 DIABETES MELLITUS WITH HYPERGLYCEMIA, WITHOUT LONG-TERM CURRENT USE OF INSULIN: ICD-10-CM

## 2019-11-25 DIAGNOSIS — E11.9 TYPE 2 DIABETES MELLITUS WITHOUT COMPLICATION, WITHOUT LONG-TERM CURRENT USE OF INSULIN: Primary | ICD-10-CM

## 2019-11-25 RX ORDER — METFORMIN HYDROCHLORIDE 500 MG/1
TABLET, EXTENDED RELEASE ORAL
Qty: 60 TABLET | Refills: 1 | Status: SHIPPED | OUTPATIENT
Start: 2019-11-25 | End: 2020-05-27 | Stop reason: SDUPTHER

## 2019-12-25 DIAGNOSIS — I10 ESSENTIAL HYPERTENSION: ICD-10-CM

## 2019-12-25 DIAGNOSIS — F41.1 GAD (GENERALIZED ANXIETY DISORDER): ICD-10-CM

## 2019-12-26 RX ORDER — BUSPIRONE HYDROCHLORIDE 15 MG/1
TABLET ORAL
Qty: 60 TABLET | Refills: 0 | Status: SHIPPED | OUTPATIENT
Start: 2019-12-26 | End: 2020-07-31 | Stop reason: SDUPTHER

## 2019-12-26 RX ORDER — HYDROCHLOROTHIAZIDE 25 MG/1
TABLET ORAL
Qty: 30 TABLET | Refills: 0 | Status: SHIPPED | OUTPATIENT
Start: 2019-12-26 | End: 2020-07-31

## 2019-12-26 NOTE — TELEPHONE ENCOUNTER
Message left on patient's voicemail to please call the clinic to schedule a lab appointment before the end of the year for his BMP and HgbA1c as instructed by Dr. Taylor when she last refilled his Metformin.  This can be done at any [facility] lab location.

## 2020-01-24 DIAGNOSIS — K51.90 ULCERATIVE COLITIS WITHOUT COMPLICATIONS, UNSPECIFIED LOCATION: ICD-10-CM

## 2020-01-24 DIAGNOSIS — I10 ESSENTIAL HYPERTENSION: ICD-10-CM

## 2020-01-24 RX ORDER — LOSARTAN POTASSIUM 50 MG/1
TABLET ORAL
Qty: 30 TABLET | Refills: 1 | Status: SHIPPED | OUTPATIENT
Start: 2020-01-24 | End: 2020-03-22

## 2020-01-26 RX ORDER — MESALAMINE 0.38 G/1
CAPSULE, EXTENDED RELEASE ORAL
Qty: 120 CAPSULE | Refills: 5 | Status: SHIPPED | OUTPATIENT
Start: 2020-01-26 | End: 2020-08-21

## 2020-02-22 DIAGNOSIS — I10 ESSENTIAL HYPERTENSION: ICD-10-CM

## 2020-02-22 DIAGNOSIS — E11.65 TYPE 2 DIABETES MELLITUS WITH HYPERGLYCEMIA, WITHOUT LONG-TERM CURRENT USE OF INSULIN: ICD-10-CM

## 2020-02-23 RX ORDER — LOSARTAN POTASSIUM 50 MG/1
TABLET ORAL
Qty: 30 TABLET | Refills: 1 | OUTPATIENT
Start: 2020-02-23

## 2020-02-23 RX ORDER — METFORMIN HYDROCHLORIDE 500 MG/1
TABLET, EXTENDED RELEASE ORAL
Qty: 60 TABLET | Refills: 1 | OUTPATIENT
Start: 2020-02-23

## 2020-03-22 DIAGNOSIS — I10 ESSENTIAL HYPERTENSION: ICD-10-CM

## 2020-03-22 RX ORDER — LOSARTAN POTASSIUM 50 MG/1
TABLET ORAL
Qty: 30 TABLET | Refills: 3 | Status: SHIPPED | OUTPATIENT
Start: 2020-03-22 | End: 2020-07-22

## 2020-05-23 DIAGNOSIS — E78.5 HYPERLIPIDEMIA, UNSPECIFIED HYPERLIPIDEMIA TYPE: ICD-10-CM

## 2020-05-24 RX ORDER — PRAVASTATIN SODIUM 20 MG/1
TABLET ORAL
Qty: 30 TABLET | Refills: 0 | Status: SHIPPED | OUTPATIENT
Start: 2020-05-24 | End: 2020-05-27 | Stop reason: SDUPTHER

## 2020-05-27 DIAGNOSIS — E78.5 HYPERLIPIDEMIA, UNSPECIFIED HYPERLIPIDEMIA TYPE: ICD-10-CM

## 2020-05-27 DIAGNOSIS — E11.65 TYPE 2 DIABETES MELLITUS WITH HYPERGLYCEMIA, WITHOUT LONG-TERM CURRENT USE OF INSULIN: ICD-10-CM

## 2020-05-27 DIAGNOSIS — Z00.00 ROUTINE MEDICAL EXAM: Primary | ICD-10-CM

## 2020-05-27 DIAGNOSIS — Z12.5 PROSTATE CANCER SCREENING: ICD-10-CM

## 2020-05-27 RX ORDER — PRAVASTATIN SODIUM 20 MG/1
TABLET ORAL
Qty: 30 TABLET | Refills: 1 | Status: SHIPPED | OUTPATIENT
Start: 2020-05-27 | End: 2020-07-31 | Stop reason: SDUPTHER

## 2020-05-27 RX ORDER — METFORMIN HYDROCHLORIDE 500 MG/1
1000 TABLET, EXTENDED RELEASE ORAL DAILY
Qty: 60 TABLET | Refills: 2 | Status: SHIPPED | OUTPATIENT
Start: 2020-05-27 | End: 2020-07-31 | Stop reason: SDUPTHER

## 2020-05-27 RX ORDER — METFORMIN HYDROCHLORIDE 500 MG/1
TABLET, EXTENDED RELEASE ORAL
Qty: 60 TABLET | Refills: 1 | OUTPATIENT
Start: 2020-05-27

## 2020-05-27 NOTE — TELEPHONE ENCOUNTER
Last seen March 2019 on [facility], no other visits in Primary Care or HbA1c since then. Please call and ask if he plans on ever coming to [facility]? He has not responded to portal messages.

## 2020-07-17 ENCOUNTER — PATIENT OUTREACH (OUTPATIENT)
Dept: ADMINISTRATIVE | Facility: HOSPITAL | Age: 50
End: 2020-07-17

## 2020-07-17 ENCOUNTER — LAB VISIT (OUTPATIENT)
Dept: LAB | Facility: HOSPITAL | Age: 50
End: 2020-07-17
Attending: INTERNAL MEDICINE
Payer: COMMERCIAL

## 2020-07-17 DIAGNOSIS — Z12.5 PROSTATE CANCER SCREENING: ICD-10-CM

## 2020-07-17 DIAGNOSIS — E11.65 TYPE 2 DIABETES MELLITUS WITH HYPERGLYCEMIA, WITHOUT LONG-TERM CURRENT USE OF INSULIN: ICD-10-CM

## 2020-07-17 DIAGNOSIS — Z00.00 ROUTINE MEDICAL EXAM: ICD-10-CM

## 2020-07-17 LAB
BASOPHILS # BLD AUTO: 0.06 K/UL (ref 0–0.2)
BASOPHILS NFR BLD: 0.7 % (ref 0–1.9)
DIFFERENTIAL METHOD: ABNORMAL
EOSINOPHIL # BLD AUTO: 0.2 K/UL (ref 0–0.5)
EOSINOPHIL NFR BLD: 2.6 % (ref 0–8)
ERYTHROCYTE [DISTWIDTH] IN BLOOD BY AUTOMATED COUNT: 13.1 % (ref 11.5–14.5)
ESTIMATED AVG GLUCOSE: 169 MG/DL (ref 68–131)
HBA1C MFR BLD HPLC: 7.5 % (ref 4–5.6)
HCT VFR BLD AUTO: 51.3 % (ref 40–54)
HGB BLD-MCNC: 17 G/DL (ref 14–18)
IMM GRANULOCYTES # BLD AUTO: 0.04 K/UL (ref 0–0.04)
IMM GRANULOCYTES NFR BLD AUTO: 0.5 % (ref 0–0.5)
LYMPHOCYTES # BLD AUTO: 2.6 K/UL (ref 1–4.8)
LYMPHOCYTES NFR BLD: 29.7 % (ref 18–48)
MCH RBC QN AUTO: 28.1 PG (ref 27–31)
MCHC RBC AUTO-ENTMCNC: 33.1 G/DL (ref 32–36)
MCV RBC AUTO: 85 FL (ref 82–98)
MONOCYTES # BLD AUTO: 0.9 K/UL (ref 0.3–1)
MONOCYTES NFR BLD: 10.1 % (ref 4–15)
NEUTROPHILS # BLD AUTO: 4.9 K/UL (ref 1.8–7.7)
NEUTROPHILS NFR BLD: 56.4 % (ref 38–73)
NRBC BLD-RTO: 0 /100 WBC
PLATELET # BLD AUTO: 353 K/UL (ref 150–350)
PMV BLD AUTO: 8.9 FL (ref 9.2–12.9)
RBC # BLD AUTO: 6.06 M/UL (ref 4.6–6.2)
WBC # BLD AUTO: 8.74 K/UL (ref 3.9–12.7)

## 2020-07-17 PROCEDURE — 80061 LIPID PANEL: CPT

## 2020-07-17 PROCEDURE — 80053 COMPREHEN METABOLIC PANEL: CPT

## 2020-07-17 PROCEDURE — 83036 HEMOGLOBIN GLYCOSYLATED A1C: CPT

## 2020-07-17 PROCEDURE — 85025 COMPLETE CBC W/AUTO DIFF WBC: CPT

## 2020-07-17 PROCEDURE — 84153 ASSAY OF PSA TOTAL: CPT

## 2020-07-17 PROCEDURE — 36415 COLL VENOUS BLD VENIPUNCTURE: CPT

## 2020-07-17 NOTE — PROGRESS NOTES
Pre-visit chart review completed.  Immunizations reviewed and updated.    Patient due for the following    HIV Screening     Shingles Vaccine (1 of 2)    Hemoglobin A1c     Foot Exam     Eye Exam     Lipid Panel

## 2020-07-18 LAB
ALBUMIN SERPL BCP-MCNC: 4.3 G/DL (ref 3.5–5.2)
ALP SERPL-CCNC: 88 U/L (ref 55–135)
ALT SERPL W/O P-5'-P-CCNC: 70 U/L (ref 10–44)
ANION GAP SERPL CALC-SCNC: 9 MMOL/L (ref 8–16)
AST SERPL-CCNC: 58 U/L (ref 10–40)
BILIRUB SERPL-MCNC: 1.1 MG/DL (ref 0.1–1)
BUN SERPL-MCNC: 13 MG/DL (ref 6–20)
CALCIUM SERPL-MCNC: 9.8 MG/DL (ref 8.7–10.5)
CHLORIDE SERPL-SCNC: 103 MMOL/L (ref 95–110)
CHOLEST SERPL-MCNC: 201 MG/DL (ref 120–199)
CHOLEST/HDLC SERPL: 4 {RATIO} (ref 2–5)
CO2 SERPL-SCNC: 25 MMOL/L (ref 23–29)
COMPLEXED PSA SERPL-MCNC: 0.84 NG/ML (ref 0–4)
CREAT SERPL-MCNC: 1.1 MG/DL (ref 0.5–1.4)
EST. GFR  (AFRICAN AMERICAN): >60 ML/MIN/1.73 M^2
EST. GFR  (NON AFRICAN AMERICAN): >60 ML/MIN/1.73 M^2
GLUCOSE SERPL-MCNC: 121 MG/DL (ref 70–110)
HDLC SERPL-MCNC: 50 MG/DL (ref 40–75)
HDLC SERPL: 24.9 % (ref 20–50)
LDLC SERPL CALC-MCNC: 117 MG/DL (ref 63–159)
NONHDLC SERPL-MCNC: 151 MG/DL
POTASSIUM SERPL-SCNC: 4.5 MMOL/L (ref 3.5–5.1)
PROT SERPL-MCNC: 8 G/DL (ref 6–8.4)
SODIUM SERPL-SCNC: 137 MMOL/L (ref 136–145)
TRIGL SERPL-MCNC: 170 MG/DL (ref 30–150)

## 2020-07-31 ENCOUNTER — OFFICE VISIT (OUTPATIENT)
Dept: PRIMARY CARE CLINIC | Facility: CLINIC | Age: 50
End: 2020-07-31
Payer: COMMERCIAL

## 2020-07-31 VITALS
OXYGEN SATURATION: 99 % | WEIGHT: 272.5 LBS | BODY MASS INDEX: 40.36 KG/M2 | RESPIRATION RATE: 18 BRPM | SYSTOLIC BLOOD PRESSURE: 134 MMHG | TEMPERATURE: 98 F | HEIGHT: 69 IN | DIASTOLIC BLOOD PRESSURE: 78 MMHG | HEART RATE: 87 BPM

## 2020-07-31 DIAGNOSIS — F41.1 GAD (GENERALIZED ANXIETY DISORDER): ICD-10-CM

## 2020-07-31 DIAGNOSIS — I10 ESSENTIAL HYPERTENSION: ICD-10-CM

## 2020-07-31 DIAGNOSIS — S89.91XD INJURY OF RIGHT KNEE, SUBSEQUENT ENCOUNTER: ICD-10-CM
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
## 2020-07-31 DIAGNOSIS — E11.9 TYPE 2 DIABETES MELLITUS WITHOUT COMPLICATION, WITHOUT LONG-TERM CURRENT USE OF INSULIN: Primary | ICD-10-CM

## 2020-07-31 DIAGNOSIS — E66.01 MORBID OBESITY WITH BMI OF 40.0-44.9, ADULT: ICD-10-CM

## 2020-07-31 DIAGNOSIS — R74.8 ELEVATED LIVER ENZYMES: ICD-10-CM

## 2020-07-31 DIAGNOSIS — M54.41 ACUTE RIGHT-SIDED LOW BACK PAIN WITH RIGHT-SIDED SCIATICA: ICD-10-CM

## 2020-07-31 DIAGNOSIS — K51.90 ULCERATIVE COLITIS WITHOUT COMPLICATIONS, UNSPECIFIED LOCATION: ICD-10-CM

## 2020-07-31 DIAGNOSIS — F33.1 MODERATE EPISODE OF RECURRENT MAJOR DEPRESSIVE DISORDER: ICD-10-CM

## 2020-07-31 DIAGNOSIS — M54.9 DORSALGIA, UNSPECIFIED: ICD-10-CM

## 2020-07-31 DIAGNOSIS — E78.5 HYPERLIPIDEMIA, UNSPECIFIED HYPERLIPIDEMIA TYPE: ICD-10-CM

## 2020-07-31 PROCEDURE — 3008F PR BODY MASS INDEX (BMI) DOCUMENTED: ICD-10-PCS | Mod: CPTII,S$GLB,, | Performed by: INTERNAL MEDICINE

## 2020-07-31 PROCEDURE — 3051F HG A1C>EQUAL 7.0%<8.0%: CPT | Mod: CPTII,S$GLB,, | Performed by: INTERNAL MEDICINE

## 2020-07-31 PROCEDURE — 3078F PR MOST RECENT DIASTOLIC BLOOD PRESSURE < 80 MM HG: ICD-10-PCS | Mod: CPTII,S$GLB,, | Performed by: INTERNAL MEDICINE

## 2020-07-31 PROCEDURE — 99999 PR PBB SHADOW E&M-EST. PATIENT-LVL V: ICD-10-PCS | Mod: PBBFAC,,, | Performed by: INTERNAL MEDICINE

## 2020-07-31 PROCEDURE — 3051F PR MOST RECENT HEMOGLOBIN A1C LEVEL 7.0 - < 8.0%: ICD-10-PCS | Mod: CPTII,S$GLB,, | Performed by: INTERNAL MEDICINE

## 2020-07-31 PROCEDURE — 3078F DIAST BP <80 MM HG: CPT | Mod: CPTII,S$GLB,, | Performed by: INTERNAL MEDICINE

## 2020-07-31 PROCEDURE — 99215 OFFICE O/P EST HI 40 MIN: CPT | Mod: S$GLB,,, | Performed by: INTERNAL MEDICINE

## 2020-07-31 PROCEDURE — 3075F SYST BP GE 130 - 139MM HG: CPT | Mod: CPTII,S$GLB,, | Performed by: INTERNAL MEDICINE

## 2020-07-31 PROCEDURE — 99215 PR OFFICE/OUTPT VISIT, EST, LEVL V, 40-54 MIN: ICD-10-PCS | Mod: S$GLB,,, | Performed by: INTERNAL MEDICINE

## 2020-07-31 PROCEDURE — 3008F BODY MASS INDEX DOCD: CPT | Mod: CPTII,S$GLB,, | Performed by: INTERNAL MEDICINE

## 2020-07-31 PROCEDURE — 3075F PR MOST RECENT SYSTOLIC BLOOD PRESS GE 130-139MM HG: ICD-10-PCS | Mod: CPTII,S$GLB,, | Performed by: INTERNAL MEDICINE

## 2020-07-31 PROCEDURE — 99999 PR PBB SHADOW E&M-EST. PATIENT-LVL V: CPT | Mod: PBBFAC,,, | Performed by: INTERNAL MEDICINE

## 2020-07-31 RX ORDER — TRAMADOL HYDROCHLORIDE 50 MG/1
50 TABLET ORAL EVERY 8 HOURS PRN
Qty: 21 TABLET | Refills: 0 | Status: SHIPPED | OUTPATIENT
Start: 2020-07-31 | End: 2020-08-20 | Stop reason: SDUPTHER

## 2020-07-31 RX ORDER — LOSARTAN POTASSIUM 50 MG/1
50 TABLET ORAL DAILY
Qty: 30 TABLET | Refills: 5 | Status: SHIPPED | OUTPATIENT
Start: 2020-07-31 | End: 2020-09-18 | Stop reason: DRUGHIGH

## 2020-07-31 RX ORDER — BUSPIRONE HYDROCHLORIDE 15 MG/1
15 TABLET ORAL 2 TIMES DAILY
Qty: 60 TABLET | Refills: 5 | Status: SHIPPED | OUTPATIENT
Start: 2020-07-31 | End: 2021-02-01 | Stop reason: SDUPTHER

## 2020-07-31 RX ORDER — METFORMIN HYDROCHLORIDE 500 MG/1
1000 TABLET, EXTENDED RELEASE ORAL DAILY
Qty: 60 TABLET | Refills: 5 | Status: SHIPPED | OUTPATIENT
Start: 2020-07-31 | End: 2020-10-26 | Stop reason: DRUGHIGH

## 2020-07-31 RX ORDER — PRAVASTATIN SODIUM 20 MG/1
TABLET ORAL
Qty: 30 TABLET | Refills: 5 | Status: SHIPPED | OUTPATIENT
Start: 2020-07-31 | End: 2021-02-28

## 2020-07-31 RX ORDER — BUPROPION HYDROCHLORIDE 150 MG/1
150 TABLET ORAL DAILY
Qty: 30 TABLET | Refills: 5 | Status: SHIPPED | OUTPATIENT
Start: 2020-07-31 | End: 2021-02-01 | Stop reason: SDUPTHER

## 2020-07-31 NOTE — PROGRESS NOTES
Subjective:       Patient ID: Jl Araiza is a 50 y.o. male.    Chief Complaint: Annual Exam    Last seen 16 months ago, Diabetes was well controlled. Returns for follow up much later than planned, for annual physical, chronic disease management and urgent new complaints. Labs in advance show glucose elevated again. He reports much stress over the past year, a job transition and other current events have resulted in him going off his diet and gaining weight. Had decreased Metformin from 1,000 to 500mg last visit due to hypoglycemia, and he continued this low dose with rising blood sugars until one week ago when he increased it back to 1,000 mg daily. Home glucose has decreased from mid-100's to low-100's in this time.     Acute complaints include exacerbation of depression and anxiety off meds, requesting to resume Wellbutrin and Buspar. And acute musculoskeletal injuries sustained in a fall at home 6/7/20 - slipped on wet patio outside in the rain. Sudden onset right low back pain with right sided sciatica - pain radiating down the leg with numbness - has not yet resolved; and acute right knee injury with swelling that is giving out when he walks. Using a lot of Tylenol and an occasional dose of Aleve with little relief.     PMH:   Hypertension.   Diabetes type 2, HbA1c 6.6% March '19.  Hyperlipidemia.   Ulcerative Colitis, last seen by GI 6/19.  Iron Def Anemia  Allergic Rhinitis  Depression/Anxiety, last seen by Psychiatry 12/17.  Obesity.   Eye exam 2/19. Podiatry 12/18. EGD 6/16. Colonoscopy 8/19. BMD normal 6/17.     PSH: Umbilical Hernia Repair.      Social: Non-smoker, rare alcohol.  with a son. , now working at [employer].     Hep B vaccine 2015, Prevnar 4/15. Pneumovax 11/15. Tdap 11/15. Flu shot 9/19.    NKDA.    Medications: list reviewed and reconciled. Off Buspar 6 months, off Wellbutrin 3 yrs.                  Review of Systems   Constitutional: Positive for activity change and  "unexpected weight change. Negative for appetite change, chills, diaphoresis, fatigue and fever.   HENT: Negative for nasal congestion, ear pain, hearing loss, postnasal drip, rhinorrhea, sore throat, trouble swallowing and voice change.    Eyes: Negative for pain, discharge and visual disturbance.   Respiratory: Negative for apnea, cough, chest tightness, shortness of breath and wheezing.    Cardiovascular: Positive for leg swelling. Negative for chest pain and palpitations.        Has noticed fluid settling at right ankle with no ankle joint pain.    Gastrointestinal: Negative for abdominal pain, blood in stool, constipation, diarrhea, nausea and vomiting.        No acute bowel symptoms at this time.    Endocrine: Negative for polydipsia and polyuria.   Genitourinary: Negative for difficulty urinating, dysuria, frequency, hematuria, scrotal swelling and urgency.   Musculoskeletal: Positive for arthralgias, back pain, joint swelling and leg pain. Negative for gait problem, myalgias, neck pain and neck stiffness.   Integumentary:  Negative for color change and rash.   Neurological: Negative for dizziness, seizures, syncope, weakness, numbness and headaches.   Hematological: Negative for adenopathy. Does not bruise/bleed easily.   Psychiatric/Behavioral: Positive for dysphoric mood. Negative for agitation, confusion, sleep disturbance and suicidal ideas. The patient is nervous/anxious.          Objective:    /78, Pulse 87, Temp 98.3, O2 Sat 99%, Ht 5' 9", Wt 272.6 lbs (from 251), BMI=40  Physical Exam  Constitutional:       General: He is not in acute distress.     Appearance: He is well-developed. He is obese. He is not diaphoretic.   HENT:      Head: Normocephalic and atraumatic.      Right Ear: Tympanic membrane, ear canal and external ear normal.      Left Ear: Tympanic membrane, ear canal and external ear normal.   Eyes:      General: No scleral icterus.     Extraocular Movements: Extraocular movements " intact.      Conjunctiva/sclera: Conjunctivae normal.      Pupils: Pupils are equal, round, and reactive to light.   Neck:      Musculoskeletal: Normal range of motion and neck supple.      Thyroid: No thyromegaly.      Vascular: No carotid bruit or JVD.   Cardiovascular:      Rate and Rhythm: Normal rate and regular rhythm.      Pulses: Normal pulses.      Heart sounds: Normal heart sounds. No murmur. No friction rub. No gallop.    Pulmonary:      Effort: Pulmonary effort is normal. No respiratory distress.      Breath sounds: Normal breath sounds. No wheezing, rhonchi or rales.   Abdominal:      General: Bowel sounds are normal. There is no distension.      Palpations: Abdomen is soft. There is no mass.      Tenderness: There is no abdominal tenderness.      Hernia: No hernia is present.   Musculoskeletal:      Comments: No point tenderness along the spine, no spinal deformity, low back is tender in right sacroiliac area. Right knee without deformity, but asymmetrical soft tissue mass noted in distal anterior thigh with crepitus and pain on range of motion. Trace dependent edema in right lower leg, likely settling down from the knee, none in left. No calf tenderness.    Lymphadenopathy:      Cervical: No cervical adenopathy.   Skin:     General: Skin is warm and dry.      Findings: No rash.   Neurological:      General: No focal deficit present.      Mental Status: He is alert and oriented to person, place, and time.      Cranial Nerves: No cranial nerve deficit.      Motor: No abnormal muscle tone.      Coordination: Coordination normal.      Deep Tendon Reflexes: Reflexes are normal and symmetric.   Psychiatric:         Attention and Perception: Attention normal.         Mood and Affect: Mood is depressed.         Speech: Speech normal.         Behavior: Behavior normal.         Thought Content: Thought content normal.         Judgment: Judgment normal.      7/17/20: CBC normal, CMP normal except Fasting Glucose  121, TBili 1.1, AST 58, ALT 70, HbA1c 7.5%, PSA 0.84, TChol 201, , HDL 50, , Urine Microalbumin normal.    Assessment:       1. Routine medical exam    2. Type 2 diabetes mellitus without complication, without long-term current use of insulin    3. Essential hypertension    4. Hyperlipidemia, unspecified hyperlipidemia type    5. Morbid obesity with BMI of 40.0-44.9, adult    6. Fall at home, subsequent encounter    7. Acute right-sided low back pain with right-sided sciatica    8. Injury of right knee, subsequent encounter    9. Dorsalgia, unspecified    10. MYAH (generalized anxiety disorder)    11. Mild recurrent major depression    12. Ulcerative colitis without complications, unspecified location        Plan:       Type 2 diabetes mellitus without complication, without long-term current use of insulin  -     metFORMIN (GLUCOPHAGE-XR) 500 MG XR 24hr tablet; Take 2 tablets (1,000 mg total) by mouth once daily.  Dispense: 60 tablet; Refill: 5  -     Comprehensive metabolic panel; Future  -     Hemoglobin A1C; Future - prior to return in 3 months.   -     Resume diet.   -     Eye exam to be scheduled by patient.    Essential hypertension - near goal, continue same.  -     losartan (COZAAR) 50 MG tablet; Take 1 tablet (50 mg total) by mouth once daily.  Dispense: 30 tablet; Refill: 5    Hyperlipidemia, unspecified hyperlipidemia type - near goal, med not increased due to elevated liver enzymes.  -     pravastatin (PRAVACHOL) 20 MG tablet; TAKE 1 TABLET(20 MG) BY MOUTH EVERY DAY FOR CHOLESTEROL  Dispense: 30 tablet; Refill: 5    Morbid obesity with BMI of 40.0-44.9, adult        -     Resume diet for weight reduction, glucose control and cardiovascular health.     Fall at home, subsequent encounter  -     MRI Lumbar Spine Without Contrast; Future; Expected date: 07/31/2020    Acute right-sided low back pain with right-sided sciatica  -     MRI Lumbar Spine Without Contrast; Future; Expected date:  07/31/2020  -     traMADoL (ULTRAM) 50 mg tablet; Take 1 tablet (50 mg total) by mouth every 8 (eight) hours as needed for Pain.  Dispense: 21 tablet; Refill: 0    Injury of right knee, subsequent encounter  -     Ambulatory referral/consult to Orthopedics; Future; Expected date: 08/07/2020  -     traMADoL (ULTRAM) 50 mg tablet; Take 1 tablet (50 mg total) by mouth every 8 (eight) hours as needed for Pain.  Dispense: 21 tablet; Refill: 0    Dorsalgia, unspecified  -     MRI Lumbar Spine Without Contrast; Future; Expected date: 07/31/2020    MYAH (generalized anxiety disorder)  -     Resume BusPIRone (BUSPAR) 15 MG tablet; Take 1 tablet (15 mg total) by mouth 2 (two) times daily.  Dispense: 60 tablet; Refill: 5    Moderate episode of recurrent major depressive disorder  -     Start BuPROPion (WELLBUTRIN XL) 150 MG TB24 tablet; Take 1 tablet (150 mg total) by mouth once daily.  Dispense: 30 tablet; Refill: 5    Ulcerative colitis without complications, unspecified location         -     Stable on Mesalamine.     Elevated liver enzymes         -     Decrease use of Tylenol, repeat labs as scheduled, ultrasound if elevations persist, suspect fatty liver.     Flu shot when available. Shingrix recommended.

## 2020-08-05 ENCOUNTER — HOSPITAL ENCOUNTER (OUTPATIENT)
Dept: RADIOLOGY | Facility: HOSPITAL | Age: 50
Discharge: HOME | End: 2020-08-05
Attending: INTERNAL MEDICINE
Payer: COMMERCIAL

## 2020-08-05 ENCOUNTER — TELEPHONE (OUTPATIENT)
Dept: PRIMARY CARE CLINIC | Facility: CLINIC | Age: 50
End: 2020-08-05

## 2020-08-05 DIAGNOSIS — M54.9 DORSALGIA, UNSPECIFIED: ICD-10-CM

## 2020-08-05 DIAGNOSIS — M51.16 LUMBAR DISC DISEASE WITH RADICULOPATHY: Primary | ICD-10-CM
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
## 2020-08-05 DIAGNOSIS — M54.41 ACUTE RIGHT-SIDED LOW BACK PAIN WITH RIGHT-SIDED SCIATICA: ICD-10-CM
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
## 2020-08-05 PROCEDURE — 72148 MRI LUMBAR SPINE WITHOUT CONTRAST: ICD-10-PCS | Mod: 26,,, | Performed by: RADIOLOGY

## 2020-08-05 PROCEDURE — 72148 MRI LUMBAR SPINE W/O DYE: CPT | Mod: 26,,, | Performed by: RADIOLOGY

## 2020-08-05 PROCEDURE — 72148 MRI LUMBAR SPINE W/O DYE: CPT | Mod: TC

## 2020-08-06 NOTE — TELEPHONE ENCOUNTER
Lumbar spine MRI results and recommendations posted. Pain Management consult recommended, I think he would prefer to come to [facility] for this.

## 2020-08-06 NOTE — PROGRESS NOTES
[facility] New Patient Evaluation    Referred by: Dr Corine Taylor  Reason for referral: back pain    CC:   Chief Complaint   Patient presents with    Back Pain    Leg Pain     right     Last 3 PDI Scores 8/7/2020   Pain Disability Index (PDI) 21       HPI:   Jl Araiza is a 50 y.o. male who complains of back pain shooting into the LLE.  He reports chronic low back pain for many years with episodic flare up, but this episode is worse that before and is associated with new symptoms of pain shooting into the right lateral, buttock, thigh, and leg terminating in the toes.  Pain also shoots into the anterior thigh and he reports several episodes of brief weakness where his knee gave out.    Location: back pain   Onset: 2 weeks ago  Current Pain Score: 3/10  Daily Pain of Range: 3-8/10  Quality: Dull, Throbbing, Tingling, Numb and Sharp  Radiation: right leg   Worsened by: lying down, sitting  Improved by: nothing    Previous Therapies:  PT/OT: Denies  HEP: Endorses intermittent exercise.  Interventions: Denies  Surgery: Denies back surgery  Medications:   - NSAIDS:   - MSK Relaxants:   - TCAs:   - SNRIs:   - Topicals:   - Anticonvulsants:  - Opioids:     Current Pain Medications:  1. Tramadol     Review of Systems:  Review of Systems   Constitutional: Negative for chills and fever.   HENT: Negative for nosebleeds.    Eyes: Negative for blurred vision.   Respiratory: Negative for hemoptysis.    Cardiovascular: Negative for chest pain and palpitations.   Gastrointestinal: Negative for heartburn and vomiting.   Genitourinary: Negative for hematuria.   Musculoskeletal: Positive for falls, joint pain and neck pain. Negative for myalgias.   Skin: Negative for rash.   Neurological: Positive for tingling (RLE) and weakness (RLE). Negative for seizures and loss of consciousness.   Endo/Heme/Allergies: Does not bruise/bleed easily.   Psychiatric/Behavioral: Negative for hallucinations. The patient has  insomnia (shooting pain in leg is worse at night).        History:    Current Outpatient Medications:     buPROPion (WELLBUTRIN XL) 150 MG TB24 tablet, Take 1 tablet (150 mg total) by mouth once daily., Disp: 30 tablet, Rfl: 5    busPIRone (BUSPAR) 15 MG tablet, Take 1 tablet (15 mg total) by mouth 2 (two) times daily., Disp: 60 tablet, Rfl: 5    flash glucose sensor (FREESTYLE AMANDEEP 14 DAY SENSOR) Kit, 1 each by Misc.(Non-Drug; Combo Route) route every 14 (fourteen) days., Disp: 2 kit, Rfl: 11    losartan (COZAAR) 50 MG tablet, Take 1 tablet (50 mg total) by mouth once daily., Disp: 30 tablet, Rfl: 5    mesalamine (APRISO) 0.375 gram Cp24, TAKE 4 CAPSULES(1.5 GRAMS) BY MOUTH EVERY DAY, Disp: 120 capsule, Rfl: 5    metFORMIN (GLUCOPHAGE-XR) 500 MG XR 24hr tablet, Take 2 tablets (1,000 mg total) by mouth once daily., Disp: 60 tablet, Rfl: 5    pravastatin (PRAVACHOL) 20 MG tablet, TAKE 1 TABLET(20 MG) BY MOUTH EVERY DAY FOR CHOLESTEROL, Disp: 30 tablet, Rfl: 5    sildenafil (VIAGRA) 50 MG tablet, Take 1 tablet (50 mg total) by mouth daily as needed for Erectile Dysfunction., Disp: 6 tablet, Rfl: 5    traMADoL (ULTRAM) 50 mg tablet, Take 1 tablet (50 mg total) by mouth every 8 (eight) hours as needed for Pain., Disp: 21 tablet, Rfl: 0    gabapentin (NEURONTIN) 300 MG capsule, Take 1 capsule (300 mg total) by mouth 3 (three) times daily., Disp: 90 capsule, Rfl: 0    tiZANidine (ZANAFLEX) 4 MG tablet, Take 1 tablet (4 mg total) by mouth 2 (two) times daily as needed., Disp: 30 tablet, Rfl: 0    Past Medical History:   Diagnosis Date    Abdominal hernia     Anxiety     Essential hypertension 11/2/2015    Hyperlipidemia     Obese     UC (ulcerative colitis)        Past Surgical History:   Procedure Laterality Date    COLONOSCOPY N/A 6/15/2016    Procedure: COLONOSCOPY;  Surgeon: Edmund Marie MD;  Location: [address];  Service: Endoscopy;  Laterality: N/A;  Patient reports that during his  last Colonoscopy, he did not fall asleep.    COLONOSCOPY N/A 8/4/2017    Procedure: COLONOSCOPY;  Surgeon: Mya Booth MD;  Location: [facility] ([address] [place] [address]);  Service: Endoscopy;  Laterality: N/A;  schedule as 45 minute case    COLONOSCOPY N/A 8/22/2019    Procedure: COLONOSCOPY;  Surgeon: Edmund Marie MD;  Location: [facility] ([address] [place] [address]);  Service: Endoscopy;  Laterality: N/A;    ESOPHAGOGASTRODUODENOSCOPY      HERNIA REPAIR  Umbilical Hernia    VASECTOMY         Family History   Problem Relation Age of Onset    Cancer Mother         Liver    Alcohol abuse Mother     Liver cancer Mother 54        ETOH liver disease    Cirrhosis Mother 54        ETOH liver disease    Alcohol abuse Father     Cushing syndrome Father     No Known Problems Sister     Stroke Paternal Grandfather     No Known Problems Maternal Aunt     Liver cancer Maternal Uncle     No Known Problems Paternal Aunt     No Known Problems Paternal Uncle     No Known Problems Maternal Grandmother     No Known Problems Maternal Grandfather     No Known Problems Paternal Grandmother     Amblyopia Neg Hx     Blindness Neg Hx     Cataracts Neg Hx     Diabetes Neg Hx     Glaucoma Neg Hx     Hypertension Neg Hx     Macular degeneration Neg Hx     Retinal detachment Neg Hx     Strabismus Neg Hx     Thyroid disease Neg Hx     Celiac disease Neg Hx     Colon cancer Neg Hx     Colon polyps Neg Hx     Crohn's disease Neg Hx     Esophageal cancer Neg Hx     Inflammatory bowel disease Neg Hx     Liver disease Neg Hx     Rectal cancer Neg Hx     Stomach cancer Neg Hx     Ulcerative colitis Neg Hx     Cystic fibrosis Neg Hx     Hemochromatosis Neg Hx     Irritable bowel syndrome Neg Hx     Harmeet's disease Neg Hx     Multiple sclerosis Neg Hx     Tuberculosis Neg Hx     Rheum arthritis Neg Hx     Scleroderma Neg Hx     Lymphoma Neg Hx        Social History     Socioeconomic History    Marital status:       Spouse name: Not on file    Number of children: Not on file    Years of education: Not on file    Highest education level: Not on file   Occupational History    Occupation:      Employer: [employer]   Social Needs    Financial resource strain: Not very hard    Food insecurity     Worry: Never true     Inability: Never true    Transportation needs     Medical: No     Non-medical: No   Tobacco Use    Smoking status: Never Smoker    Smokeless tobacco: Never Used   Substance and Sexual Activity    Alcohol use: No     Frequency: Never     Drinks per session: Patient refused     Binge frequency: Never     Comment: Infrequently drinks one beer.      Drug use: No    Sexual activity: Yes     Partners: Female     Comment:     Lifestyle    Physical activity     Days per week: 5 days     Minutes per session: 30 min    Stress: Rather much   Relationships    Social connections     Talks on phone: Twice a week     Gets together: Never     Attends Shinto service: Not on file     Active member of club or organization: No     Attends meetings of clubs or organizations: Never     Relationship status:    Other Topics Concern    Patient feels they ought to cut down on drinking/drug use Not Asked    Patient annoyed by others criticizing their drinking/drug use Not Asked    Patient has felt bad or guilty about drinking/drug use Not Asked    Patient has had a drink/used drugs as an eye opener in the AM Not Asked   Social History Narrative           Review of patient's allergies indicates:   Allergen Reactions    Cat/feline products Anaphylaxis     Difficulty breathing    Cigarette smoke Shortness Of Breath     Difficulty breathing       Physical Exam:  Vitals:    08/07/20 0827   BP: (!) 132/90   Weight: 123.1 kg (271 lb 6.2 oz)   PainSc:   3     General    Nursing note and vitals reviewed.  Constitutional: He is oriented to person, place, and time. He appears well-developed and  well-nourished. No distress.   HENT:   Head: Normocephalic and atraumatic.   Nose: Nose normal.   Eyes: Conjunctivae and EOM are normal. Pupils are equal, round, and reactive to light. Right eye exhibits no discharge. Left eye exhibits no discharge. No scleral icterus.   Neck: No JVD present.   Cardiovascular: Intact distal pulses.    Pulmonary/Chest: Effort normal. No respiratory distress.   Abdominal: He exhibits no distension.   Neurological: He is alert and oriented to person, place, and time. Coordination normal.   Psychiatric: He has a normal mood and affect. His behavior is normal. Judgment and thought content normal.     General Musculoskeletal Exam   Gait: normal and antalgic     Back (L-Spine & T-Spine) / Neck (C-Spine) Exam     Tenderness Right paramedian tenderness of the Lower L-Spine. Left paramedian tenderness of the Lower L-Spine.     Back (L-Spine & T-Spine) Range of Motion   Back extension: facet loading is positive and exacerabtes/reproduces the patient's typical low back pain    Back flexion: limited ROM but partial relief of low back pain noted.     Spinal Sensation   Right Side Sensation  L-Spine Level: normal  Left Side Sensation  L-Spine Level: normal    Other He has no scoliosis .    Comments:  + SLR      Muscle Strength   Right Lower Extremity   Hip Flexion: 5/5   Hip Extensors: 5/5  Quadriceps:  5/5   Hamstrin/5   Gastrocsoleus:  5/5/5  Left Lower Extremity   Hip Flexion: 5/5   Hip Extensors: 5/5  Quadriceps:  5/5   Hamstrin/5   Gastrocsoleus:  5/5/5    Reflexes     Left Side  Quadriceps:  2+  Achilles:  2+    Right Side   Quadriceps:  2+  Achilles:  2+      Imaging:  MRI Lumbar Spine Without Contrast 2020  FINDINGS:Alignment: Normal.   Vertebrae: Normal marrow signal. No fracture.   Discs: Mild intervertebral disc space narrowing at level of T12-L1 and L1-L2.   Cord: Normal.  Conus terminates at L1   Degenerative findings:   L1-L2: Unremarkable.   L2-L3: Unremarkable.    L3-L4: Left-sided ligamentum flavum hypertrophy with facet joint spurring (axial series 9, image 24) abutting the left posterior aspect of the thecal sac.  No spinal canal stenosis or neural foraminal narrowing.   L4-L5: Mild diffuse disc bulging with annular fissure and no spinal canal stenosis.  Bilateral facet joint hypertrophy resulting in mild bilateral neural foraminal narrowing.   L5-S1: Bilateral facet joint hypertrophy, left more than right, with left-sided 4 mm synovial cyst abutting the exiting nerve root of L5 (axial series 9, image 36).  Mild left-sided neural foraminal narrowing.  No spinal canal stenosis.   Upper sacrum and sacroiliac joints are unremarkable.   Paraspinal muscles & soft tissues: Unremarkable.     Impression:     1. Multilevel spondylosis with no significant spinal canal stenosis, as above.  2. Annular fissure is identified at the level of L4-L5.  3. Synovial cyst is identified at the level L5-S1 abutting the exiting nerve root of L5 with mild left-sided neural foraminal narrowing.         Labs:  BMP  Lab Results   Component Value Date     07/17/2020    K 4.5 07/17/2020     07/17/2020    CO2 25 07/17/2020    BUN 13 07/17/2020    CREATININE 1.1 07/17/2020    CALCIUM 9.8 07/17/2020    ANIONGAP 9 07/17/2020    ESTGFRAFRICA >60.0 07/17/2020    EGFRNONAA >60.0 07/17/2020     Lab Results   Component Value Date    ALT 70 (H) 07/17/2020    AST 58 (H) 07/17/2020    ALKPHOS 88 07/17/2020    BILITOT 1.1 (H) 07/17/2020       Assessment:  Problem List Items Addressed This Visit     Annular tear of lumbar disc    Lumbar disc disease with radiculopathy - Primary    Relevant Medications    gabapentin (NEURONTIN) 300 MG capsule    tiZANidine (ZANAFLEX) 4 MG tablet          8/7/20 - Jl MAGAÑA Teodora is a 50 y.o. male with chronic low back pain with intermittent flare-ups now presenting with new radicular symptoms affecting the right lower extremity.  MRI of the lumbar spine shows  degenerative disc disease at multiple levels; however, there is an annular tear at L4-5 with a paracentral disc bulge which is likely contacting the descending L5 nerve root, hence the radiating pattern in the L5 dermatome on the right leg.  He is also describing symptoms affecting the L4 dermatome, which is likely due to extrusion of the disc contents at that level causing chemical neuritis.  Patient was advised of these MRI findings and educated on the underlying pathology producing his symptoms.  I have recommended a combination of home exercise, epidural steroid injection, and non opioid medications.  Medications will include gabapentin and tizanidine, both of which will be use primarily at night when he experiences the majority of his symptoms.  We also discussed the role of weight loss and reducing lumbar disc strain.    : Reviewed and consistent with medication use as prescribed.    Treatment Plan:   Procedures: s/f Right TFESI @ L4-5.  PT/OT/HEP: Discussed core strengthening HE program  Medications:    - Tizanidine 4 mg BID PRN   - Gabapentin 300 mg PO TID but to start QHS only  Labs: reviewed and medications are appropriately dosed for current hepatorenal function.  Imaging: No additional recommended at this time.    Follow Up: RTC 2-3 weeks via telemed for post-procedure f/u    Fernando Cabezas Jr, MD  Interventional Pain Medicine / Anesthesiology    Disclaimer: This note was partly generated using dictation software which may occasionally result in transcription errors.

## 2020-08-06 NOTE — H&P (VIEW-ONLY)
[facility] New Patient Evaluation    Referred by: Dr Corine Taylor  Reason for referral: back pain    CC:   Chief Complaint   Patient presents with    Back Pain    Leg Pain     right     Last 3 PDI Scores 8/7/2020   Pain Disability Index (PDI) 21       HPI:   Jl Araiza is a 50 y.o. male who complains of back pain shooting into the LLE.  He reports chronic low back pain for many years with episodic flare up, but this episode is worse that before and is associated with new symptoms of pain shooting into the right lateral, buttock, thigh, and leg terminating in the toes.  Pain also shoots into the anterior thigh and he reports several episodes of brief weakness where his knee gave out.    Location: back pain   Onset: 2 weeks ago  Current Pain Score: 3/10  Daily Pain of Range: 3-8/10  Quality: Dull, Throbbing, Tingling, Numb and Sharp  Radiation: right leg   Worsened by: lying down, sitting  Improved by: nothing    Previous Therapies:  PT/OT: Denies  HEP: Endorses intermittent exercise.  Interventions: Denies  Surgery: Denies back surgery  Medications:   - NSAIDS:   - MSK Relaxants:   - TCAs:   - SNRIs:   - Topicals:   - Anticonvulsants:  - Opioids:     Current Pain Medications:  1. Tramadol     Review of Systems:  Review of Systems   Constitutional: Negative for chills and fever.   HENT: Negative for nosebleeds.    Eyes: Negative for blurred vision.   Respiratory: Negative for hemoptysis.    Cardiovascular: Negative for chest pain and palpitations.   Gastrointestinal: Negative for heartburn and vomiting.   Genitourinary: Negative for hematuria.   Musculoskeletal: Positive for falls, joint pain and neck pain. Negative for myalgias.   Skin: Negative for rash.   Neurological: Positive for tingling (RLE) and weakness (RLE). Negative for seizures and loss of consciousness.   Endo/Heme/Allergies: Does not bruise/bleed easily.   Psychiatric/Behavioral: Negative for hallucinations. The patient has  insomnia (shooting pain in leg is worse at night).        History:    Current Outpatient Medications:     buPROPion (WELLBUTRIN XL) 150 MG TB24 tablet, Take 1 tablet (150 mg total) by mouth once daily., Disp: 30 tablet, Rfl: 5    busPIRone (BUSPAR) 15 MG tablet, Take 1 tablet (15 mg total) by mouth 2 (two) times daily., Disp: 60 tablet, Rfl: 5    flash glucose sensor (FREESTYLE AMANDEEP 14 DAY SENSOR) Kit, 1 each by Misc.(Non-Drug; Combo Route) route every 14 (fourteen) days., Disp: 2 kit, Rfl: 11    losartan (COZAAR) 50 MG tablet, Take 1 tablet (50 mg total) by mouth once daily., Disp: 30 tablet, Rfl: 5    mesalamine (APRISO) 0.375 gram Cp24, TAKE 4 CAPSULES(1.5 GRAMS) BY MOUTH EVERY DAY, Disp: 120 capsule, Rfl: 5    metFORMIN (GLUCOPHAGE-XR) 500 MG XR 24hr tablet, Take 2 tablets (1,000 mg total) by mouth once daily., Disp: 60 tablet, Rfl: 5    pravastatin (PRAVACHOL) 20 MG tablet, TAKE 1 TABLET(20 MG) BY MOUTH EVERY DAY FOR CHOLESTEROL, Disp: 30 tablet, Rfl: 5    sildenafil (VIAGRA) 50 MG tablet, Take 1 tablet (50 mg total) by mouth daily as needed for Erectile Dysfunction., Disp: 6 tablet, Rfl: 5    traMADoL (ULTRAM) 50 mg tablet, Take 1 tablet (50 mg total) by mouth every 8 (eight) hours as needed for Pain., Disp: 21 tablet, Rfl: 0    gabapentin (NEURONTIN) 300 MG capsule, Take 1 capsule (300 mg total) by mouth 3 (three) times daily., Disp: 90 capsule, Rfl: 0    tiZANidine (ZANAFLEX) 4 MG tablet, Take 1 tablet (4 mg total) by mouth 2 (two) times daily as needed., Disp: 30 tablet, Rfl: 0    Past Medical History:   Diagnosis Date    Abdominal hernia     Anxiety     Essential hypertension 11/2/2015    Hyperlipidemia     Obese     UC (ulcerative colitis)        Past Surgical History:   Procedure Laterality Date    COLONOSCOPY N/A 6/15/2016    Procedure: COLONOSCOPY;  Surgeon: Edmund Marie MD;  Location: [address];  Service: Endoscopy;  Laterality: N/A;  Patient reports that during his  last Colonoscopy, he did not fall asleep.    COLONOSCOPY N/A 8/4/2017    Procedure: COLONOSCOPY;  Surgeon: Mya Booth MD;  Location: [facility] ([address] [place] [address]);  Service: Endoscopy;  Laterality: N/A;  schedule as 45 minute case    COLONOSCOPY N/A 8/22/2019    Procedure: COLONOSCOPY;  Surgeon: Edmund Marie MD;  Location: [facility] ([address] [place] [address]);  Service: Endoscopy;  Laterality: N/A;    ESOPHAGOGASTRODUODENOSCOPY      HERNIA REPAIR  Umbilical Hernia    VASECTOMY         Family History   Problem Relation Age of Onset    Cancer Mother         Liver    Alcohol abuse Mother     Liver cancer Mother 54        ETOH liver disease    Cirrhosis Mother 54        ETOH liver disease    Alcohol abuse Father     Cushing syndrome Father     No Known Problems Sister     Stroke Paternal Grandfather     No Known Problems Maternal Aunt     Liver cancer Maternal Uncle     No Known Problems Paternal Aunt     No Known Problems Paternal Uncle     No Known Problems Maternal Grandmother     No Known Problems Maternal Grandfather     No Known Problems Paternal Grandmother     Amblyopia Neg Hx     Blindness Neg Hx     Cataracts Neg Hx     Diabetes Neg Hx     Glaucoma Neg Hx     Hypertension Neg Hx     Macular degeneration Neg Hx     Retinal detachment Neg Hx     Strabismus Neg Hx     Thyroid disease Neg Hx     Celiac disease Neg Hx     Colon cancer Neg Hx     Colon polyps Neg Hx     Crohn's disease Neg Hx     Esophageal cancer Neg Hx     Inflammatory bowel disease Neg Hx     Liver disease Neg Hx     Rectal cancer Neg Hx     Stomach cancer Neg Hx     Ulcerative colitis Neg Hx     Cystic fibrosis Neg Hx     Hemochromatosis Neg Hx     Irritable bowel syndrome Neg Hx     Harmeet's disease Neg Hx     Multiple sclerosis Neg Hx     Tuberculosis Neg Hx     Rheum arthritis Neg Hx     Scleroderma Neg Hx     Lymphoma Neg Hx        Social History     Socioeconomic History    Marital status:       Spouse name: Not on file    Number of children: Not on file    Years of education: Not on file    Highest education level: Not on file   Occupational History    Occupation:      Employer: [employer]   Social Needs    Financial resource strain: Not very hard    Food insecurity     Worry: Never true     Inability: Never true    Transportation needs     Medical: No     Non-medical: No   Tobacco Use    Smoking status: Never Smoker    Smokeless tobacco: Never Used   Substance and Sexual Activity    Alcohol use: No     Frequency: Never     Drinks per session: Patient refused     Binge frequency: Never     Comment: Infrequently drinks one beer.      Drug use: No    Sexual activity: Yes     Partners: Female     Comment:     Lifestyle    Physical activity     Days per week: 5 days     Minutes per session: 30 min    Stress: Rather much   Relationships    Social connections     Talks on phone: Twice a week     Gets together: Never     Attends Anabaptist service: Not on file     Active member of club or organization: No     Attends meetings of clubs or organizations: Never     Relationship status:    Other Topics Concern    Patient feels they ought to cut down on drinking/drug use Not Asked    Patient annoyed by others criticizing their drinking/drug use Not Asked    Patient has felt bad or guilty about drinking/drug use Not Asked    Patient has had a drink/used drugs as an eye opener in the AM Not Asked   Social History Narrative           Review of patient's allergies indicates:   Allergen Reactions    Cat/feline products Anaphylaxis     Difficulty breathing    Cigarette smoke Shortness Of Breath     Difficulty breathing       Physical Exam:  Vitals:    08/07/20 0827   BP: (!) 132/90   Weight: 123.1 kg (271 lb 6.2 oz)   PainSc:   3     General    Nursing note and vitals reviewed.  Constitutional: He is oriented to person, place, and time. He appears well-developed and  well-nourished. No distress.   HENT:   Head: Normocephalic and atraumatic.   Nose: Nose normal.   Eyes: Conjunctivae and EOM are normal. Pupils are equal, round, and reactive to light. Right eye exhibits no discharge. Left eye exhibits no discharge. No scleral icterus.   Neck: No JVD present.   Cardiovascular: Intact distal pulses.    Pulmonary/Chest: Effort normal. No respiratory distress.   Abdominal: He exhibits no distension.   Neurological: He is alert and oriented to person, place, and time. Coordination normal.   Psychiatric: He has a normal mood and affect. His behavior is normal. Judgment and thought content normal.     General Musculoskeletal Exam   Gait: normal and antalgic     Back (L-Spine & T-Spine) / Neck (C-Spine) Exam     Tenderness Right paramedian tenderness of the Lower L-Spine. Left paramedian tenderness of the Lower L-Spine.     Back (L-Spine & T-Spine) Range of Motion   Back extension: facet loading is positive and exacerabtes/reproduces the patient's typical low back pain    Back flexion: limited ROM but partial relief of low back pain noted.     Spinal Sensation   Right Side Sensation  L-Spine Level: normal  Left Side Sensation  L-Spine Level: normal    Other He has no scoliosis .    Comments:  + SLR      Muscle Strength   Right Lower Extremity   Hip Flexion: 5/5   Hip Extensors: 5/5  Quadriceps:  5/5   Hamstrin/5   Gastrocsoleus:  5/5/5  Left Lower Extremity   Hip Flexion: 5/5   Hip Extensors: 5/5  Quadriceps:  5/5   Hamstrin/5   Gastrocsoleus:  5/5/5    Reflexes     Left Side  Quadriceps:  2+  Achilles:  2+    Right Side   Quadriceps:  2+  Achilles:  2+      Imaging:  MRI Lumbar Spine Without Contrast 2020  FINDINGS:Alignment: Normal.   Vertebrae: Normal marrow signal. No fracture.   Discs: Mild intervertebral disc space narrowing at level of T12-L1 and L1-L2.   Cord: Normal.  Conus terminates at L1   Degenerative findings:   L1-L2: Unremarkable.   L2-L3: Unremarkable.    L3-L4: Left-sided ligamentum flavum hypertrophy with facet joint spurring (axial series 9, image 24) abutting the left posterior aspect of the thecal sac.  No spinal canal stenosis or neural foraminal narrowing.   L4-L5: Mild diffuse disc bulging with annular fissure and no spinal canal stenosis.  Bilateral facet joint hypertrophy resulting in mild bilateral neural foraminal narrowing.   L5-S1: Bilateral facet joint hypertrophy, left more than right, with left-sided 4 mm synovial cyst abutting the exiting nerve root of L5 (axial series 9, image 36).  Mild left-sided neural foraminal narrowing.  No spinal canal stenosis.   Upper sacrum and sacroiliac joints are unremarkable.   Paraspinal muscles & soft tissues: Unremarkable.     Impression:     1. Multilevel spondylosis with no significant spinal canal stenosis, as above.  2. Annular fissure is identified at the level of L4-L5.  3. Synovial cyst is identified at the level L5-S1 abutting the exiting nerve root of L5 with mild left-sided neural foraminal narrowing.         Labs:  BMP  Lab Results   Component Value Date     07/17/2020    K 4.5 07/17/2020     07/17/2020    CO2 25 07/17/2020    BUN 13 07/17/2020    CREATININE 1.1 07/17/2020    CALCIUM 9.8 07/17/2020    ANIONGAP 9 07/17/2020    ESTGFRAFRICA >60.0 07/17/2020    EGFRNONAA >60.0 07/17/2020     Lab Results   Component Value Date    ALT 70 (H) 07/17/2020    AST 58 (H) 07/17/2020    ALKPHOS 88 07/17/2020    BILITOT 1.1 (H) 07/17/2020       Assessment:  Problem List Items Addressed This Visit     Annular tear of lumbar disc    Lumbar disc disease with radiculopathy - Primary    Relevant Medications    gabapentin (NEURONTIN) 300 MG capsule    tiZANidine (ZANAFLEX) 4 MG tablet          8/7/20 - Jl MAGAÑA Teodora is a 50 y.o. male with chronic low back pain with intermittent flare-ups now presenting with new radicular symptoms affecting the right lower extremity.  MRI of the lumbar spine shows  degenerative disc disease at multiple levels; however, there is an annular tear at L4-5 with a paracentral disc bulge which is likely contacting the descending L5 nerve root, hence the radiating pattern in the L5 dermatome on the right leg.  He is also describing symptoms affecting the L4 dermatome, which is likely due to extrusion of the disc contents at that level causing chemical neuritis.  Patient was advised of these MRI findings and educated on the underlying pathology producing his symptoms.  I have recommended a combination of home exercise, epidural steroid injection, and non opioid medications.  Medications will include gabapentin and tizanidine, both of which will be use primarily at night when he experiences the majority of his symptoms.  We also discussed the role of weight loss and reducing lumbar disc strain.    : Reviewed and consistent with medication use as prescribed.    Treatment Plan:   Procedures: s/f Right TFESI @ L4-5.  PT/OT/HEP: Discussed core strengthening HE program  Medications:    - Tizanidine 4 mg BID PRN   - Gabapentin 300 mg PO TID but to start QHS only  Labs: reviewed and medications are appropriately dosed for current hepatorenal function.  Imaging: No additional recommended at this time.    Follow Up: RTC 2-3 weeks via telemed for post-procedure f/u    Fernando Cabezas Jr, MD  Interventional Pain Medicine / Anesthesiology    Disclaimer: This note was partly generated using dictation software which may occasionally result in transcription errors.

## 2020-08-07 ENCOUNTER — TELEPHONE (OUTPATIENT)
Dept: PAIN MEDICINE | Facility: CLINIC | Age: 50
End: 2020-08-07

## 2020-08-07 ENCOUNTER — OFFICE VISIT (OUTPATIENT)
Dept: PAIN MEDICINE | Facility: CLINIC | Age: 50
End: 2020-08-07
Payer: COMMERCIAL

## 2020-08-07 VITALS
WEIGHT: 271.38 LBS | BODY MASS INDEX: 40.08 KG/M2 | DIASTOLIC BLOOD PRESSURE: 90 MMHG | SYSTOLIC BLOOD PRESSURE: 132 MMHG

## 2020-08-07 DIAGNOSIS — M51.16 LUMBAR DISC DISEASE WITH RADICULOPATHY: Primary | ICD-10-CM

## 2020-08-07 DIAGNOSIS — M54.16 LUMBAR RADICULOPATHY: Primary | ICD-10-CM

## 2020-08-07 DIAGNOSIS — M51.36 ANNULAR TEAR OF LUMBAR DISC: ICD-10-CM

## 2020-08-07 PROBLEM — M51.369 ANNULAR TEAR OF LUMBAR DISC: Status: ACTIVE | Noted: 2020-08-07

## 2020-08-07 PROCEDURE — 99244 PR OFFICE CONSULTATION,LEVEL IV: ICD-10-PCS | Mod: S$GLB,,, | Performed by: PAIN MEDICINE

## 2020-08-07 PROCEDURE — 99999 PR PBB SHADOW E&M-EST. PATIENT-LVL III: CPT | Mod: PBBFAC,,, | Performed by: PAIN MEDICINE

## 2020-08-07 PROCEDURE — 99999 PR PBB SHADOW E&M-EST. PATIENT-LVL III: ICD-10-PCS | Mod: PBBFAC,,, | Performed by: PAIN MEDICINE

## 2020-08-07 PROCEDURE — 99244 OFF/OP CNSLTJ NEW/EST MOD 40: CPT | Mod: S$GLB,,, | Performed by: PAIN MEDICINE

## 2020-08-07 RX ORDER — GABAPENTIN 300 MG/1
300 CAPSULE ORAL 3 TIMES DAILY
Qty: 90 CAPSULE | Refills: 0 | Status: SHIPPED | OUTPATIENT
Start: 2020-08-07 | End: 2020-08-20

## 2020-08-07 RX ORDER — TIZANIDINE 4 MG/1
4 TABLET ORAL 2 TIMES DAILY PRN
Qty: 30 TABLET | Refills: 0 | Status: SHIPPED | OUTPATIENT
Start: 2020-08-07 | End: 2020-09-29

## 2020-08-07 NOTE — LETTER
August 7, 2020      Corine Taylor MD  [address]  [facility] [address]           [facility]  [address]  [facility] [address]  Phone: [phone]  Fax: [phone]          Patient: Jl Araiza   MR Number: [id number]   YOB: 1970   Date of Visit: 8/7/2020       Dear Dr. Corine Taylor:    Thank you for referring Jl Araiza to me for evaluation. Attached you will find relevant portions of my assessment and plan of care.    If you have questions, please do not hesitate to call me. I look forward to following Jl Araiza along with you.    Sincerely,    Fernando Cabezas Jr., MD    Enclosure  CC:  No Recipients    If you would like to receive this communication electronically, please contact [email or web address] or [phone] to request more information on [employer] Link access.    For providers and/or their staff who would like to refer a patient to [facility], please contact us through our one-stop-shop provider referral line, [facility], at [phone].    If you feel you have received this communication in error or would no longer like to receive these types of communications, please e-mail [email or web address]

## 2020-08-07 NOTE — TELEPHONE ENCOUNTER
Pt scheduled for 8/18/20 at 1100 am for Right TFESI. Pt aware to check in at registration desk on the first floor of the hospital for 1000 am.

## 2020-08-11 ENCOUNTER — PATIENT OUTREACH (OUTPATIENT)
Dept: ADMINISTRATIVE | Facility: OTHER | Age: 50
End: 2020-08-11

## 2020-08-11 DIAGNOSIS — M25.561 RIGHT KNEE PAIN, UNSPECIFIED CHRONICITY: Primary | ICD-10-CM

## 2020-08-11 NOTE — PROGRESS NOTES
Chart reviewed.   Immunizations: updated  Orders placed: n/a  Upcoming appts to satisfy PHYLLIS topics: Optometry 9/29

## 2020-08-12 ENCOUNTER — HOSPITAL ENCOUNTER (OUTPATIENT)
Dept: RADIOLOGY | Facility: HOSPITAL | Age: 50
Discharge: HOME | End: 2020-08-12
Attending: NURSE PRACTITIONER
Payer: COMMERCIAL

## 2020-08-12 ENCOUNTER — OFFICE VISIT (OUTPATIENT)
Dept: ORTHOPEDICS | Facility: CLINIC | Age: 50
End: 2020-08-12
Payer: COMMERCIAL

## 2020-08-12 VITALS — HEIGHT: 69 IN | WEIGHT: 271.38 LBS | BODY MASS INDEX: 40.2 KG/M2

## 2020-08-12 DIAGNOSIS — S89.91XD INJURY OF RIGHT KNEE, SUBSEQUENT ENCOUNTER: ICD-10-CM

## 2020-08-12 DIAGNOSIS — M25.571 RIGHT ANKLE PAIN, UNSPECIFIED CHRONICITY: ICD-10-CM

## 2020-08-12 DIAGNOSIS — M79.671 RIGHT FOOT PAIN: ICD-10-CM

## 2020-08-12 DIAGNOSIS — M25.561 RIGHT KNEE PAIN, UNSPECIFIED CHRONICITY: Primary | ICD-10-CM

## 2020-08-12 DIAGNOSIS — M25.561 RIGHT KNEE PAIN, UNSPECIFIED CHRONICITY: ICD-10-CM

## 2020-08-12 PROCEDURE — 73562 X-RAY EXAM OF KNEE 3: CPT | Mod: TC,LT

## 2020-08-12 PROCEDURE — 99999 PR PBB SHADOW E&M-EST. PATIENT-LVL IV: CPT | Mod: PBBFAC,,, | Performed by: NURSE PRACTITIONER

## 2020-08-12 PROCEDURE — 99203 PR OFFICE/OUTPT VISIT, NEW, LEVL III, 30-44 MIN: ICD-10-PCS | Mod: S$GLB,,, | Performed by: NURSE PRACTITIONER

## 2020-08-12 PROCEDURE — 99999 PR PBB SHADOW E&M-EST. PATIENT-LVL IV: ICD-10-PCS | Mod: PBBFAC,,, | Performed by: NURSE PRACTITIONER

## 2020-08-12 PROCEDURE — 73562 X-RAY EXAM OF KNEE 3: CPT | Mod: 26,LT,, | Performed by: RADIOLOGY

## 2020-08-12 PROCEDURE — 73562 XR KNEE ORTHO RIGHT WITH FLEXION: ICD-10-PCS | Mod: 26,LT,, | Performed by: RADIOLOGY

## 2020-08-12 PROCEDURE — 3008F BODY MASS INDEX DOCD: CPT | Mod: CPTII,S$GLB,, | Performed by: NURSE PRACTITIONER

## 2020-08-12 PROCEDURE — 73564 X-RAY EXAM KNEE 4 OR MORE: CPT | Mod: 26,RT,, | Performed by: RADIOLOGY

## 2020-08-12 PROCEDURE — 73564 XR KNEE ORTHO RIGHT WITH FLEXION: ICD-10-PCS | Mod: 26,RT,, | Performed by: RADIOLOGY

## 2020-08-12 PROCEDURE — 3008F PR BODY MASS INDEX (BMI) DOCUMENTED: ICD-10-PCS | Mod: CPTII,S$GLB,, | Performed by: NURSE PRACTITIONER

## 2020-08-12 PROCEDURE — 73630 XR FOOT COMPLETE 3 VIEW RIGHT: ICD-10-PCS | Mod: 26,RT,, | Performed by: RADIOLOGY

## 2020-08-12 PROCEDURE — 99203 OFFICE O/P NEW LOW 30 MIN: CPT | Mod: S$GLB,,, | Performed by: NURSE PRACTITIONER

## 2020-08-12 PROCEDURE — 73610 XR ANKLE COMPLETE 3 VIEW RIGHT: ICD-10-PCS | Mod: 26,RT,, | Performed by: RADIOLOGY

## 2020-08-12 PROCEDURE — 73630 X-RAY EXAM OF FOOT: CPT | Mod: TC,RT

## 2020-08-12 PROCEDURE — 73610 X-RAY EXAM OF ANKLE: CPT | Mod: TC,RT

## 2020-08-12 PROCEDURE — 73630 X-RAY EXAM OF FOOT: CPT | Mod: 26,RT,, | Performed by: RADIOLOGY

## 2020-08-12 PROCEDURE — 73610 X-RAY EXAM OF ANKLE: CPT | Mod: 26,RT,, | Performed by: RADIOLOGY

## 2020-08-12 NOTE — PROGRESS NOTES
CC: Pain of the Right Knee      HPI: Pt with c/o right knee, right ankle and right foot pain for the past several months. He had two falls over the past several months, but wasn't seen due to covid concerns. Since then he has had pain in the right lateral knee, the lateral right ankle, and pain and swelling of the 2nd toe on the right foot. He is also being followed by Dr. Aguayo for chronic pain of his back. He has an ERNESTO scheduled later this month with Brandon Michaels. The pain in his knee is aching and burning and worse with increased activity. The ankle pain is aching and worse with going up and down stairs and the toe swelling and pain happen intermittently and it isn't as much pain as the swelling that he's concerned about. He hasn't been taking anything for the pain. He is ambulating without assistive device. There is not a limp.    ROS  General: denies fever and chills  Resp: no c/o sob  CVS: no c/o cp  MSK: c/o right knee, right ankle, and right 2nd toe pain    PE  General: AAOx3, pleasant and cooperative  Resp: respirations even and unlabored  MSK: right knee exam  0 degrees extension  120 degrees flexion  No warmth or erythema   - effusion  Mild crepitus    Right ankle exam  Full range of motion  Minimal swelling and tenderness over the lateral ankle    Right foot exam  Mild swelling to the 2nd toe  No erythema or bruising  No pain with movement    Xray:  Reviewed foot and ankle with Dr. Beard and by me with patient: knee: Some medial compartment tibiofemoral joint space narrowing is observed on both sides.  Allowing for positioning on the merchant's projection, I doubt that a significant degree of patellofemoral narrowing is present on either side.  Osseous structures demonstrate no evidence of recent or healing fracture, lytic destructive process, or osteochondral defect.  Lateral projection of the right knee demonstrates some superior patellar spurring and a sizable joint effusion.    Ankle: Mild DJD.   Small bony spur seen arising from the calcaneus.  No fracture or dislocation.  No bone destruction identified    Foot: Three views right: There is DJD and spurs on the calcaneus.  There is an os tibialis externum.  No fracture dislocation bone destruction seen    Assessment:  Knee pain  Right ankle sprain- resolving  Right toe arthritis    Plan:  voltaren gel prn toe, ankle, and knee pain  ERNESTO as scheduled 8/25  Consider PT after ERNESTO for weakness if needed

## 2020-08-12 NOTE — LETTER
August 14, 2020      Corine Taylor MD  [address] [facility] [address]           [facility]  [address]  [facility] [address]  Phone: [phone]          Patient: Jl Araiza   MR Number: [id number]   YOB: 1970   Date of Visit: 8/12/2020       Dear Dr. Corine Taylor:    Thank you for referring Jl Araiza to me for evaluation. Attached you will find relevant portions of my assessment and plan of care.    If you have questions, please do not hesitate to call me. I look forward to following Jl Araiza along with you.    Sincerely,    Twila Bowie, NP    Enclosure  CC:  No Recipients    If you would like to receive this communication electronically, please contact [email or web address] or [phone] to request more information on [employer] Link access.    For providers and/or their staff who would like to refer a patient to [facility], please contact us through our one-stop-shop provider referral line, [facility], at [phone].    If you feel you have received this communication in error or would no longer like to receive these types of communications, please e-mail [email or web address]

## 2020-08-17 ENCOUNTER — TELEPHONE (OUTPATIENT)
Dept: PAIN MEDICINE | Facility: CLINIC | Age: 50
End: 2020-08-17

## 2020-08-17 NOTE — DISCHARGE INSTRUCTIONS
Home Care Instructions Pain Management:    1.  DIET:    You may resume your normal diet today.    2.  BATHING:    You may shower with luke warm water.    3.  DRESSING:    You may remove your bandage today.    4.  ACTIVITY LEVEL:      You may resume your normal activities 24 hours after your procedure.    5.  MEDICATIONS:    You may resume your normal medications today.    6.  SPECIAL INSTRUCTIONS:    No heat to the injection site for 24 hours including bath or shower, heating pad, moist heat or hot tubs.    Use an ice pack to the injection site for any pain or discomfort.  Apply ice packs for 20 minute intervals as needed.    If you have received any sedatives by mouth today, you can not drive for 12 hours.    If you have received sedation through an IV, you can not drive for 24 hours.    PLEASE CALL YOUR DOCTOR FOR THE FOLLOWIN.  Redness or swelling around the injection site.  2.  Fever of 101 degrees.  3.  Drainage (pus) from the injection site.  4.  For any continuous bleeding (some dried blood over the incision is normal.)    FOR EMERGENCIES:    If any unusual problems or difficulties occur during clinic hours, call [phone] or dial [phone].    Follow up with with your physician in 2-3 weeks.

## 2020-08-17 NOTE — TELEPHONE ENCOUNTER
----- Message from Jay Ledbetter sent at 8/17/2020  9:27 AM CDT -----  Contact: Patient  Pt called and would like to have someone call him     This is regarding his injection tomorrow    Pt can be reached at [phone]

## 2020-08-18 ENCOUNTER — HOSPITAL ENCOUNTER (OUTPATIENT)
Facility: HOSPITAL | Age: 50
Discharge: HOME | End: 2020-08-18
Attending: PAIN MEDICINE | Admitting: PAIN MEDICINE
Payer: COMMERCIAL

## 2020-08-18 VITALS
HEART RATE: 95 BPM | HEIGHT: 69 IN | WEIGHT: 270 LBS | TEMPERATURE: 98 F | DIASTOLIC BLOOD PRESSURE: 98 MMHG | RESPIRATION RATE: 15 BRPM | SYSTOLIC BLOOD PRESSURE: 165 MMHG | OXYGEN SATURATION: 97 % | BODY MASS INDEX: 39.99 KG/M2

## 2020-08-18 DIAGNOSIS — M54.16 LUMBAR RADICULOPATHY: Primary | ICD-10-CM

## 2020-08-18 DIAGNOSIS — G89.29 CHRONIC PAIN: ICD-10-CM

## 2020-08-18 LAB — POCT GLUCOSE: 101 MG/DL (ref 70–110)

## 2020-08-18 PROCEDURE — 64483 PR EPIDURAL INJ, ANES/STEROID, TRANSFORAMINAL, LUMB/SACR, SNGL LEVL: ICD-10-PCS | Mod: RT,,, | Performed by: PAIN MEDICINE

## 2020-08-18 PROCEDURE — 63600175 PHARM REV CODE 636 W HCPCS: Performed by: PAIN MEDICINE

## 2020-08-18 PROCEDURE — 64483 NJX AA&/STRD TFRM EPI L/S 1: CPT | Mod: RT,,, | Performed by: PAIN MEDICINE

## 2020-08-18 PROCEDURE — 25000003 PHARM REV CODE 250: Performed by: PAIN MEDICINE

## 2020-08-18 PROCEDURE — 25500020 PHARM REV CODE 255: Performed by: PAIN MEDICINE

## 2020-08-18 PROCEDURE — 64483 NJX AA&/STRD TFRM EPI L/S 1: CPT | Performed by: PAIN MEDICINE

## 2020-08-18 RX ORDER — LIDOCAINE HYDROCHLORIDE 10 MG/ML
INJECTION INFILTRATION; PERINEURAL
Status: DISCONTINUED | OUTPATIENT
Start: 2020-08-18 | End: 2020-08-18 | Stop reason: HOSPADM

## 2020-08-18 RX ORDER — BUPIVACAINE HYDROCHLORIDE 2.5 MG/ML
INJECTION, SOLUTION EPIDURAL; INFILTRATION; INTRACAUDAL
Status: DISCONTINUED | OUTPATIENT
Start: 2020-08-18 | End: 2020-08-18 | Stop reason: HOSPADM

## 2020-08-18 RX ORDER — DEXAMETHASONE SODIUM PHOSPHATE 10 MG/ML
INJECTION INTRAMUSCULAR; INTRAVENOUS
Status: DISCONTINUED | OUTPATIENT
Start: 2020-08-18 | End: 2020-08-18 | Stop reason: HOSPADM

## 2020-08-18 RX ORDER — SODIUM BICARBONATE 1 MEQ/ML
SYRINGE (ML) INTRAVENOUS
Status: DISCONTINUED | OUTPATIENT
Start: 2020-08-18 | End: 2020-08-18 | Stop reason: HOSPADM

## 2020-08-18 NOTE — OP NOTE
"Procedure Note    Pre-operative Diagnosis: Lumbar Radiculopathy  Post-operative Diagnosis: Lumbar Radiculopathy  Procedure Date: 08/18/2020  Procedure:  (1) Lumbar Transforaminal Epidural Steroid Injection, Right L4-5    (2) Intraoperative fluoroscopy    Indications: To alleviate pain and suffering, and reduce functional impairment associated with lumbar radiculopathy.      The patients history and physical exam were reviewed. The risks, benefits and alternatives to the procedure were discussed, and all questions were answered to the patients satisfaction. The patient agreed to proceed, and written informed consent was verified.    Procedure in Detail: Using a fenestrated drape and Chloro-prep, the skin was prepared and draped in sterile fashion. The Right L4-5 neural foramen was identified by fluoroscopy by Right lateral oblique angle. Lidocaine 1% 3-5 mL was used to anesthetize the skin at the skin entry point and subcutaneous tissue with 25G 1 1/2 in needle. Then a 22G 5" spinal needle was advanced under intermittent fluoroscopy until Kambin's triangle was entered anterolateral to the superior articular process. Fluoroscopy was then inspected from lateral and AP view and needle adjusted appropriately. There was no paresthesia with needle placement. Aspiration was negative for blood and CSF. Omnipaque contrast was injected live in an AP fluoroscopic view demonstrating appropriate needle position with contrast spread into the nerve root sheath and medially into the epidural space without intravascular or intrathecal spread. Next,a mixture 1 mL MPF Bupivacaine 0.25% and 10 mg dexamethasone (total 2 mL) was injected slowly and incrementally. The patient tolerated the procedure without complaint and was transported to the recovery room in stable condition.     Disposition: The patient tolerated the procedure well, and there were no apparent complications. Vital signs remained stable throughout the procedure. The " patient was taken to the recovery area where written discharge instructions for the procedure were given.     Follow-up: RTC as scheduled      Fernando Cabezas Jr, MD  Interventional Pain Medicine / Anesthesiology

## 2020-08-18 NOTE — PLAN OF CARE
Patient is discharged home.  Discharge instructions on diet, activity, post-procedure care and follow-up visits are given to the patient and patient verbalized complete understanding per [place].  Patient prefers to ambulate to the Exit and accompanies per Pain Management staff.  Instructed to call PCP about his elevated blood pressure and patient verbalized understanding.  Voiced no concerns.  Safety maintained.

## 2020-08-18 NOTE — PLAN OF CARE
Patient is Recovery.  Patient is awake and alert.  Drinking apple juice at this time.  Denies pain. Bandaid X1 to left back is clean, dry and intact.  Safety is maintained with stretcher low, wheels locked and side rails up.  Will continue to monitor.

## 2020-08-18 NOTE — DISCHARGE SUMMARY
[facility]  Discharge Note  Short Stay     Admit Date: 8/18/2020    Discharge Date: 8/18/2020     Attending Physician: Fernando Cabezas Jr, MD    Diagnoses:  Active Hospital Problems    Diagnosis  POA    Chronic pain [G89.29]  Yes      Resolved Hospital Problems   No resolved problems to display.     Discharged Condition: Good     Hospital Course: Patient was admitted for an outpatient interventional pain management procedure and tolerated the procedure well with no complications.     Final Diagnoses: Same as principal problem.     Disposition: Home or Self Care     Follow up/Patient Instructions:    Follow-up Information     Fernando Cabezas Jr, MD. Go in 2 weeks.    Specialty: Pain Medicine  Why: Post-procedural Follow Up As Scheduled  Contact information:  [address]  [address]  [place] [address] [phone]                   Reconciled Medications:     Medication List      CONTINUE taking these medications    buPROPion 150 MG TB24 tablet  Commonly known as: WELLBUTRIN XL  Take 1 tablet (150 mg total) by mouth once daily.     busPIRone 15 MG tablet  Commonly known as: BUSPAR  Take 1 tablet (15 mg total) by mouth 2 (two) times daily.     flash glucose sensor Kit  Commonly known as: FREESTYLE AMANDEEP 14 DAY SENSOR  1 each by Misc.(Non-Drug; Combo Route) route every 14 (fourteen) days.     gabapentin 300 MG capsule  Commonly known as: NEURONTIN  Take 1 capsule (300 mg total) by mouth 3 (three) times daily.     losartan 50 MG tablet  Commonly known as: COZAAR  Take 1 tablet (50 mg total) by mouth once daily.     mesalamine 0.375 gram Cp24  Commonly known as: APRISO  TAKE 4 CAPSULES(1.5 GRAMS) BY MOUTH EVERY DAY     metFORMIN 500 MG ER 24hr tablet  Commonly known as: GLUCOPHAGE-XR  Take 2 tablets (1,000 mg total) by mouth once daily.     pravastatin 20 MG tablet  Commonly known as: PRAVACHOL  TAKE 1 TABLET(20 MG) BY MOUTH EVERY DAY FOR CHOLESTEROL     sildenafiL 50 MG tablet  Commonly known as:  VIAGRA  Take 1 tablet (50 mg total) by mouth daily as needed for Erectile Dysfunction.     tiZANidine 4 MG tablet  Commonly known as: ZANAFLEX  Take 1 tablet (4 mg total) by mouth 2 (two) times daily as needed.     traMADoL 50 mg tablet  Commonly known as: ULTRAM  Take 1 tablet (50 mg total) by mouth every 8 (eight) hours as needed for Pain.           Discharge Procedure Orders (must include Diet, Follow-up, Activity)   Call MD for:  temperature >100.4     Call MD for:  severe uncontrolled pain     Call MD for:  redness, tenderness, or signs of infection (pain, swelling, redness, odor or green/yellow discharge around incision site)     Call MD for:  difficulty breathing or increased cough     Call MD for:  severe persistent headache     Call MD for:  worsening rash     Remove dressing in 24 hours       Fernando Cabezas Jr, MD  Interventional Pain Medicine / Anesthesiology

## 2020-08-18 NOTE — INTERVAL H&P NOTE
No significant changes were noted from the H&P or last clinic note.    The risks and benefits of this intervention, and alternative therapies were discussed with the patient.  The discussion of risks included infection, bleeding, need for additional procedures or surgery, nerve damage, paralysis, adverse medication reaction(s), stroke, and/or death.  Questions regarding the procedure, risks, expected outcome, and possible side effects were solicited and answered to the patient's satisfaction.  Jl Teodora wishes to proceed with the injection or procedure.  Written consent was obtained.    Fernando Cabezas Jr, MD  Interventional Pain Medicine / Anesthesiology

## 2020-08-20 ENCOUNTER — PATIENT MESSAGE (OUTPATIENT)
Dept: PAIN MEDICINE | Facility: CLINIC | Age: 50
End: 2020-08-20

## 2020-08-20 DIAGNOSIS — S89.91XD INJURY OF RIGHT KNEE, SUBSEQUENT ENCOUNTER: ICD-10-CM

## 2020-08-20 DIAGNOSIS — M54.41 ACUTE RIGHT-SIDED LOW BACK PAIN WITH RIGHT-SIDED SCIATICA: Primary | ICD-10-CM

## 2020-08-20 RX ORDER — GABAPENTIN 400 MG/1
400 CAPSULE ORAL 3 TIMES DAILY
Qty: 90 CAPSULE | Refills: 1 | Status: SHIPPED | OUTPATIENT
Start: 2020-08-20 | End: 2020-11-02 | Stop reason: SDUPTHER

## 2020-08-20 RX ORDER — ACETAMINOPHEN 500 MG
1000 TABLET ORAL 3 TIMES DAILY
Refills: 0 | COMMUNITY
Start: 2020-08-20 | End: 2020-09-29

## 2020-08-20 RX ORDER — TRAMADOL HYDROCHLORIDE 50 MG/1
50 TABLET ORAL EVERY 12 HOURS PRN
Qty: 14 TABLET | Refills: 0 | Status: SHIPPED | OUTPATIENT
Start: 2020-08-20 | End: 2021-08-09

## 2020-08-20 RX ORDER — NAPROXEN SODIUM 220 MG
440 TABLET ORAL 2 TIMES DAILY WITH MEALS
COMMUNITY
Start: 2020-08-20 | End: 2020-09-29

## 2020-08-20 NOTE — TELEPHONE ENCOUNTER
Patient reporting symptoms consistent with possible post-steroid flare up or advancing DDD causing greater nerve compression.  He is 2 days s/p TFESI at L4 and describing symptoms in the L5 dermatome.    Patient offered temporary med regiment to control pain.    Fernando Cabezas Jr, MD  Interventional Pain Medicine / Anesthesiology

## 2020-08-20 NOTE — TELEPHONE ENCOUNTER
Patient advised to use the followin) Take 2 extra strength tylenol, three times per day  2) Take 2 Aleve in the morning and 2 at night  3) Take Gabapentin 400 mg three times per day (I'll send in the new prescription)  4) Take Tramadol 50 mg twice daily as needed for pain worse than 7/10 not controlled by the above medications    Fernando Cabezas Jr, MD  Interventional Pain Medicine / Anesthesiology

## 2020-08-25 ENCOUNTER — OFFICE VISIT (OUTPATIENT)
Dept: PAIN MEDICINE | Facility: CLINIC | Age: 50
End: 2020-08-25
Payer: COMMERCIAL

## 2020-08-25 DIAGNOSIS — G89.4 CHRONIC PAIN SYNDROME: ICD-10-CM

## 2020-08-25 DIAGNOSIS — M54.16 LUMBAR RADICULOPATHY: Primary | ICD-10-CM

## 2020-08-25 DIAGNOSIS — M51.36 ANNULAR TEAR OF LUMBAR DISC: ICD-10-CM

## 2020-08-25 DIAGNOSIS — M54.41 ACUTE RIGHT-SIDED LOW BACK PAIN WITH RIGHT-SIDED SCIATICA: ICD-10-CM

## 2020-08-25 DIAGNOSIS — M51.36 DDD (DEGENERATIVE DISC DISEASE), LUMBAR: ICD-10-CM

## 2020-08-25 DIAGNOSIS — M51.16 LUMBAR DISC DISEASE WITH RADICULOPATHY: ICD-10-CM

## 2020-08-25 PROCEDURE — 99213 OFFICE O/P EST LOW 20 MIN: CPT | Mod: 95,,, | Performed by: NURSE PRACTITIONER

## 2020-08-25 PROCEDURE — 99213 PR OFFICE/OUTPT VISIT, EST, LEVL III, 20-29 MIN: ICD-10-PCS | Mod: 95,,, | Performed by: NURSE PRACTITIONER

## 2020-08-25 NOTE — H&P (VIEW-ONLY)
[facility] established Patient Evaluation  telemedicine Encounter    Telemedicine Bundle:  This visit was completed during the Coronavirus Crisis to enhance patient safety.  The patient location is: patient's home  The chief complaint leading to consultation is:  Continued right knee, calf and right foot pain.  Visit type: Virtual visit with synchronous audio and video  Total time spent with patient: 20 minutes  Each patient to whom he or she provides medical services by telemedicine is:    (1) informed of the relationship between the physician and patient and the respective role of any other health care provider with respect to management of the patient  (2) notified that he or she may decline to receive medical services by telemedicine and may withdraw from such care at any time.      Referred by: Dr Corine Taylor  Reason for referral: back pain    CC:   No chief complaint on file.    Last 3 PDI Scores 8/7/2020   Pain Disability Index (PDI) 21       HPI:   Jl Araiza is a 50 y.o. male who complains of back pain shooting into the LLE.  He reports chronic low back pain for many years with episodic flare up, but this episode is worse that before and is associated with new symptoms of pain shooting into the right lateral, buttock, thigh, and leg terminating in the toes.  Pain also shoots into the anterior thigh and he reports several episodes of brief weakness where his knee gave out.    Interval updates    08/25/[id number]-cfmt-old male presents virtually he is  status post right  TFESI @ L4-5 patient reported 50% relief at this point patient states he continues to have right knee, right calf and right foot pain which actually feels worse his pain is constant described as spasms, burning and shooting.    Location: back pain   Onset: 2 weeks ago  Current Pain Score: 3/10  Daily Pain of Range: 3-8/10  Quality: Dull, Throbbing, Tingling, Numb and Sharp  Radiation: right leg   Worsened by: lying  down, sitting  Improved by: nothing    Previous Therapies:  PT/OT: Denies  HEP: Endorses intermittent exercise.  Interventions: Denies  Surgery: Denies back surgery  Medications:   - NSAIDS:   - MSK Relaxants:   - TCAs:   - SNRIs:   - Topicals:   - Anticonvulsants:  - Opioids:     Current Pain Medications:  1. Tramadol     Review of Systems:  Review of Systems   Constitutional: Negative for chills and fever.   HENT: Negative for nosebleeds.    Eyes: Negative for blurred vision.   Respiratory: Negative for hemoptysis.    Cardiovascular: Negative for chest pain and palpitations.   Gastrointestinal: Negative for heartburn and vomiting.   Genitourinary: Negative for hematuria.   Musculoskeletal: Positive for falls, joint pain and neck pain. Negative for myalgias.   Skin: Negative for rash.   Neurological: Positive for tingling (RLE) and weakness (RLE). Negative for seizures and loss of consciousness.   Endo/Heme/Allergies: Does not bruise/bleed easily.   Psychiatric/Behavioral: Negative for hallucinations. The patient has insomnia (shooting pain in leg is worse at night).        History:    Current Outpatient Medications:     acetaminophen (TYLENOL) 500 MG tablet, Take 2 tablets (1,000 mg total) by mouth 3 (three) times daily., Disp: , Rfl: 0    buPROPion (WELLBUTRIN XL) 150 MG TB24 tablet, Take 1 tablet (150 mg total) by mouth once daily., Disp: 30 tablet, Rfl: 5    busPIRone (BUSPAR) 15 MG tablet, Take 1 tablet (15 mg total) by mouth 2 (two) times daily., Disp: 60 tablet, Rfl: 5    flash glucose sensor (FREESTYLE AMANDEEP 14 DAY SENSOR) Kit, 1 each by Misc.(Non-Drug; Combo Route) route every 14 (fourteen) days., Disp: 2 kit, Rfl: 11    gabapentin (NEURONTIN) 400 MG capsule, Take 1 capsule (400 mg total) by mouth 3 (three) times daily., Disp: 90 capsule, Rfl: 1    losartan (COZAAR) 50 MG tablet, Take 1 tablet (50 mg total) by mouth once daily., Disp: 30 tablet, Rfl: 5    mesalamine (APRISO) 0.375 gram Cp24, TAKE 4  CAPSULES(1.5 GRAMS) BY MOUTH EVERY DAY, Disp: 120 capsule, Rfl: 0    metFORMIN (GLUCOPHAGE-XR) 500 MG XR 24hr tablet, Take 2 tablets (1,000 mg total) by mouth once daily., Disp: 60 tablet, Rfl: 5    naproxen sodium (ALEVE) 220 MG tablet, Take 2 tablets (440 mg total) by mouth 2 (two) times daily with meals., Disp: , Rfl:     pravastatin (PRAVACHOL) 20 MG tablet, TAKE 1 TABLET(20 MG) BY MOUTH EVERY DAY FOR CHOLESTEROL, Disp: 30 tablet, Rfl: 5    sildenafil (VIAGRA) 50 MG tablet, Take 1 tablet (50 mg total) by mouth daily as needed for Erectile Dysfunction., Disp: 6 tablet, Rfl: 5    tiZANidine (ZANAFLEX) 4 MG tablet, Take 1 tablet (4 mg total) by mouth 2 (two) times daily as needed., Disp: 30 tablet, Rfl: 0    traMADoL (ULTRAM) 50 mg tablet, Take 1 tablet (50 mg total) by mouth every 12 (twelve) hours as needed for Pain., Disp: 14 tablet, Rfl: 0    Past Medical History:   Diagnosis Date    Abdominal hernia     Anxiety     Essential hypertension 11/2/2015    Hyperlipidemia     Obese     UC (ulcerative colitis)        Past Surgical History:   Procedure Laterality Date    COLONOSCOPY N/A 6/15/2016    Procedure: COLONOSCOPY;  Surgeon: Edmund Marie MD;  Location: [address]);  Service: Endoscopy;  Laterality: N/A;  Patient reports that during his last Colonoscopy, he did not fall asleep.    COLONOSCOPY N/A 8/4/2017    Procedure: COLONOSCOPY;  Surgeon: Mya Booth MD;  Location: [address]);  Service: Endoscopy;  Laterality: N/A;  schedule as 45 minute case    COLONOSCOPY N/A 8/22/2019    Procedure: COLONOSCOPY;  Surgeon: Edmund Marie MD;  Location: [address]);  Service: Endoscopy;  Laterality: N/A;    ESOPHAGOGASTRODUODENOSCOPY      HERNIA REPAIR  Umbilical Hernia    TRANSFORAMINAL EPIDURAL INJECTION OF STEROID Right 8/18/2020    Procedure: Injection,steroid,epidural,transforaminal approach--Right L4-5;  Surgeon: Fernando Cabezas Jr., MD;  Location[facility];   Service: Pain Management;  Laterality: Right;    VASECTOMY         Family History   Problem Relation Age of Onset    Cancer Mother         Liver    Alcohol abuse Mother     Liver cancer Mother 54        ETOH liver disease    Cirrhosis Mother 54        ETOH liver disease    Alcohol abuse Father     Cushing syndrome Father     No Known Problems Sister     Stroke Paternal Grandfather     No Known Problems Maternal Aunt     Liver cancer Maternal Uncle     No Known Problems Paternal Aunt     No Known Problems Paternal Uncle     No Known Problems Maternal Grandmother     No Known Problems Maternal Grandfather     No Known Problems Paternal Grandmother     Amblyopia Neg Hx     Blindness Neg Hx     Cataracts Neg Hx     Diabetes Neg Hx     Glaucoma Neg Hx     Hypertension Neg Hx     Macular degeneration Neg Hx     Retinal detachment Neg Hx     Strabismus Neg Hx     Thyroid disease Neg Hx     Celiac disease Neg Hx     Colon cancer Neg Hx     Colon polyps Neg Hx     Crohn's disease Neg Hx     Esophageal cancer Neg Hx     Inflammatory bowel disease Neg Hx     Liver disease Neg Hx     Rectal cancer Neg Hx     Stomach cancer Neg Hx     Ulcerative colitis Neg Hx     Cystic fibrosis Neg Hx     Hemochromatosis Neg Hx     Irritable bowel syndrome Neg Hx     Harmeet's disease Neg Hx     Multiple sclerosis Neg Hx     Tuberculosis Neg Hx     Rheum arthritis Neg Hx     Scleroderma Neg Hx     Lymphoma Neg Hx        Social History     Socioeconomic History    Marital status:      Spouse name: Not on file    Number of children: Not on file    Years of education: Not on file    Highest education level: Not on file   Occupational History    Occupation:      Employer: [employer]   Social Needs    Financial resource strain: Not very hard    Food insecurity     Worry: Never true     Inability: Never true    Transportation needs     Medical: No     Non-medical: No   Tobacco  Use    Smoking status: Never Smoker    Smokeless tobacco: Never Used   Substance and Sexual Activity    Alcohol use: No     Frequency: Never     Drinks per session: Patient refused     Binge frequency: Never     Comment: Infrequently drinks one beer.      Drug use: No    Sexual activity: Yes     Partners: Female     Comment:     Lifestyle    Physical activity     Days per week: 5 days     Minutes per session: 30 min    Stress: Rather much   Relationships    Social connections     Talks on phone: Twice a week     Gets together: Never     Attends [employer] service: Not on file     Active member of club or organization: No     Attends meetings of clubs or organizations: Never     Relationship status:    Other Topics Concern    Patient feels they ought to cut down on drinking/drug use Not Asked    Patient annoyed by others criticizing their drinking/drug use Not Asked    Patient has felt bad or guilty about drinking/drug use Not Asked    Patient has had a drink/used drugs as an eye opener in the AM Not Asked   Social History Narrative           Review of patient's allergies indicates:   Allergen Reactions    Cat/feline products Anaphylaxis     Difficulty breathing    Cigarette smoke Shortness Of Breath     Difficulty breathing       Physical Exam:  There were no vitals filed for this visit.  There was no physical exam done for this virtual visit    Imaging:  MRI Lumbar Spine Without Contrast 08/05/2020  FINDINGS:Alignment: Normal.   Vertebrae: Normal marrow signal. No fracture.   Discs: Mild intervertebral disc space narrowing at level of T12-L1 and L1-L2.   Cord: Normal.  Conus terminates at L1   Degenerative findings:   L1-L2: Unremarkable.   L2-L3: Unremarkable.   L3-L4: Left-sided ligamentum flavum hypertrophy with facet joint spurring (axial series 9, image 24) abutting the left posterior aspect of the thecal sac.  No spinal canal stenosis or neural foraminal narrowing.    L4-L5: Mild diffuse disc bulging with annular fissure and no spinal canal stenosis.  Bilateral facet joint hypertrophy resulting in mild bilateral neural foraminal narrowing.   L5-S1: Bilateral facet joint hypertrophy, left more than right, with left-sided 4 mm synovial cyst abutting the exiting nerve root of L5 (axial series 9, image 36).  Mild left-sided neural foraminal narrowing.  No spinal canal stenosis.   Upper sacrum and sacroiliac joints are unremarkable.   Paraspinal muscles & soft tissues: Unremarkable.     Impression:     1. Multilevel spondylosis with no significant spinal canal stenosis, as above.  2. Annular fissure is identified at the level of L4-L5.  3. Synovial cyst is identified at the level L5-S1 abutting the exiting nerve root of L5 with mild left-sided neural foraminal narrowing.         Labs:  BMP  Lab Results   Component Value Date     07/17/2020    K 4.5 07/17/2020     07/17/2020    CO2 25 07/17/2020    BUN 13 07/17/2020    CREATININE 1.1 07/17/2020    CALCIUM 9.8 07/17/2020    ANIONGAP 9 07/17/2020    ESTGFRAFRICA >60.0 07/17/2020    EGFRNONAA >60.0 07/17/2020     Lab Results   Component Value Date    ALT 70 (H) 07/17/2020    AST 58 (H) 07/17/2020    ALKPHOS 88 07/17/2020    BILITOT 1.1 (H) 07/17/2020       Assessment:  Problem List Items Addressed This Visit        Neuro    Annular tear of lumbar disc    Lumbar disc disease with radiculopathy    Chronic pain      Other Visit Diagnoses     Lumbar radiculopathy    -  Primary    Acute right-sided low back pain with right-sided sciatica        DDD (degenerative disc disease), lumbar              8/7/20 - Jl MAGAÑA Teodora is a 50 y.o. male with chronic low back pain with intermittent flare-ups now presenting with new radicular symptoms affecting the right lower extremity.  MRI of the lumbar spine shows degenerative disc disease at multiple levels; however, there is an annular tear at L4-5 with a paracentral disc bulge which is  likely contacting the descending L5 nerve root, hence the radiating pattern in the L5 dermatome on the right leg.  He is also describing symptoms affecting the L4 dermatome, which is likely due to extrusion of the disc contents at that level causing chemical neuritis.  Patient was advised of these MRI findings and educated on the underlying pathology producing his symptoms.  I have recommended a combination of home exercise, epidural steroid injection, and non opioid medications.  Medications will include gabapentin and tizanidine, both of which will be use primarily at night when he experiences the majority of his symptoms.  We also discussed the role of weight loss and reducing lumbar disc strain.    08/25/[id number]-bjwh-old male presents status post right TF ERNESTO targeting L4-5 patient reported 50% relief with this point.  Patient  continues to have right knee, right calf and right foot pain which actually feels worse his pain is constant described as spasms, burning and shooting.  Is currently taking gabapentin 400 mg t.i.d., and tizanidine 4 mg b.i.d. p.r.n..  He and I discussed that considering he is only 7 days postop this procedure that I recommend giving injection more time to be effective.  Also discussed that we may add the L5-S1 level if we considered  repeating the injection MRI does show L5-S1 bilateral facet joint hypertrophy, left more than right with left-sided for mm synovial cyst abutting the existing nerve root of L5. The dermatome distribution of where he describes his pain would cover L5-S1 ie calf, and foot.     : Reviewed and consistent with medication use as prescribed.    Treatment Plan:   Procedures: none at this time considering repeating injection including adding L5-S1 level. Previous injection was L4-5   PT/OT/HEP: Discussed core strengthening HE program  Medications:    - continue Tizanidine 4 mg BID PRN   - continue Gabapentin 400 mg PO QID   Labs: reviewed and medications are  appropriately dosed for current hepatorenal function.  Imaging: No additional recommended at this time.    Follow Up: Patient to leave me a message in 7 days  via his my chart with % of relief and pain score, at that we will determine plan of care.     Brandon Michaels NP-C  Interventional Pain Management    Disclaimer: This note was partly generated using dictation software which may occasionally result in transcription errors.

## 2020-08-25 NOTE — PROGRESS NOTES
[facility] established Patient Evaluation  telemedicine Encounter    Telemedicine Bundle:  This visit was completed during the Coronavirus Crisis to enhance patient safety.  The patient location is: patient's home  The chief complaint leading to consultation is:  Continued right knee, calf and right foot pain.  Visit type: Virtual visit with synchronous audio and video  Total time spent with patient: 20 minutes  Each patient to whom he or she provides medical services by telemedicine is:    (1) informed of the relationship between the physician and patient and the respective role of any other health care provider with respect to management of the patient  (2) notified that he or she may decline to receive medical services by telemedicine and may withdraw from such care at any time.      Referred by: Dr Corine Taylor  Reason for referral: back pain    CC:   No chief complaint on file.    Last 3 PDI Scores 8/7/2020   Pain Disability Index (PDI) 21       HPI:   Jl Araiza is a 50 y.o. male who complains of back pain shooting into the LLE.  He reports chronic low back pain for many years with episodic flare up, but this episode is worse that before and is associated with new symptoms of pain shooting into the right lateral, buttock, thigh, and leg terminating in the toes.  Pain also shoots into the anterior thigh and he reports several episodes of brief weakness where his knee gave out.    Interval updates    08/25/[id number]-doyd-old male presents virtually he is  status post right  TFESI @ L4-5 patient reported 50% relief at this point patient states he continues to have right knee, right calf and right foot pain which actually feels worse his pain is constant described as spasms, burning and shooting.    Location: back pain   Onset: 2 weeks ago  Current Pain Score: 3/10  Daily Pain of Range: 3-8/10  Quality: Dull, Throbbing, Tingling, Numb and Sharp  Radiation: right leg   Worsened by: lying  down, sitting  Improved by: nothing    Previous Therapies:  PT/OT: Denies  HEP: Endorses intermittent exercise.  Interventions: Denies  Surgery: Denies back surgery  Medications:   - NSAIDS:   - MSK Relaxants:   - TCAs:   - SNRIs:   - Topicals:   - Anticonvulsants:  - Opioids:     Current Pain Medications:  1. Tramadol     Review of Systems:  Review of Systems   Constitutional: Negative for chills and fever.   HENT: Negative for nosebleeds.    Eyes: Negative for blurred vision.   Respiratory: Negative for hemoptysis.    Cardiovascular: Negative for chest pain and palpitations.   Gastrointestinal: Negative for heartburn and vomiting.   Genitourinary: Negative for hematuria.   Musculoskeletal: Positive for falls, joint pain and neck pain. Negative for myalgias.   Skin: Negative for rash.   Neurological: Positive for tingling (RLE) and weakness (RLE). Negative for seizures and loss of consciousness.   Endo/Heme/Allergies: Does not bruise/bleed easily.   Psychiatric/Behavioral: Negative for hallucinations. The patient has insomnia (shooting pain in leg is worse at night).        History:    Current Outpatient Medications:     acetaminophen (TYLENOL) 500 MG tablet, Take 2 tablets (1,000 mg total) by mouth 3 (three) times daily., Disp: , Rfl: 0    buPROPion (WELLBUTRIN XL) 150 MG TB24 tablet, Take 1 tablet (150 mg total) by mouth once daily., Disp: 30 tablet, Rfl: 5    busPIRone (BUSPAR) 15 MG tablet, Take 1 tablet (15 mg total) by mouth 2 (two) times daily., Disp: 60 tablet, Rfl: 5    flash glucose sensor (FREESTYLE AMANDEEP 14 DAY SENSOR) Kit, 1 each by Misc.(Non-Drug; Combo Route) route every 14 (fourteen) days., Disp: 2 kit, Rfl: 11    gabapentin (NEURONTIN) 400 MG capsule, Take 1 capsule (400 mg total) by mouth 3 (three) times daily., Disp: 90 capsule, Rfl: 1    losartan (COZAAR) 50 MG tablet, Take 1 tablet (50 mg total) by mouth once daily., Disp: 30 tablet, Rfl: 5    mesalamine (APRISO) 0.375 gram Cp24, TAKE 4  CAPSULES(1.5 GRAMS) BY MOUTH EVERY DAY, Disp: 120 capsule, Rfl: 0    metFORMIN (GLUCOPHAGE-XR) 500 MG XR 24hr tablet, Take 2 tablets (1,000 mg total) by mouth once daily., Disp: 60 tablet, Rfl: 5    naproxen sodium (ALEVE) 220 MG tablet, Take 2 tablets (440 mg total) by mouth 2 (two) times daily with meals., Disp: , Rfl:     pravastatin (PRAVACHOL) 20 MG tablet, TAKE 1 TABLET(20 MG) BY MOUTH EVERY DAY FOR CHOLESTEROL, Disp: 30 tablet, Rfl: 5    sildenafil (VIAGRA) 50 MG tablet, Take 1 tablet (50 mg total) by mouth daily as needed for Erectile Dysfunction., Disp: 6 tablet, Rfl: 5    tiZANidine (ZANAFLEX) 4 MG tablet, Take 1 tablet (4 mg total) by mouth 2 (two) times daily as needed., Disp: 30 tablet, Rfl: 0    traMADoL (ULTRAM) 50 mg tablet, Take 1 tablet (50 mg total) by mouth every 12 (twelve) hours as needed for Pain., Disp: 14 tablet, Rfl: 0    Past Medical History:   Diagnosis Date    Abdominal hernia     Anxiety     Essential hypertension 11/2/2015    Hyperlipidemia     Obese     UC (ulcerative colitis)        Past Surgical History:   Procedure Laterality Date    COLONOSCOPY N/A 6/15/2016    Procedure: COLONOSCOPY;  Surgeon: Edmund Marie MD;  Location: [address]);  Service: Endoscopy;  Laterality: N/A;  Patient reports that during his last Colonoscopy, he did not fall asleep.    COLONOSCOPY N/A 8/4/2017    Procedure: COLONOSCOPY;  Surgeon: Mya Booth MD;  Location: [address]);  Service: Endoscopy;  Laterality: N/A;  schedule as 45 minute case    COLONOSCOPY N/A 8/22/2019    Procedure: COLONOSCOPY;  Surgeon: Edmund Marie MD;  Location: [address]);  Service: Endoscopy;  Laterality: N/A;    ESOPHAGOGASTRODUODENOSCOPY      HERNIA REPAIR  Umbilical Hernia    TRANSFORAMINAL EPIDURAL INJECTION OF STEROID Right 8/18/2020    Procedure: Injection,steroid,epidural,transforaminal approach--Right L4-5;  Surgeon: Fernando Cabezas Jr., MD;  Location: [facility];   Service: Pain Management;  Laterality: Right;    VASECTOMY         Family History   Problem Relation Age of Onset    Cancer Mother         Liver    Alcohol abuse Mother     Liver cancer Mother 54        ETOH liver disease    Cirrhosis Mother 54        ETOH liver disease    Alcohol abuse Father     Cushing syndrome Father     No Known Problems Sister     Stroke Paternal Grandfather     No Known Problems Maternal Aunt     Liver cancer Maternal Uncle     No Known Problems Paternal Aunt     No Known Problems Paternal Uncle     No Known Problems Maternal Grandmother     No Known Problems Maternal Grandfather     No Known Problems Paternal Grandmother     Amblyopia Neg Hx     Blindness Neg Hx     Cataracts Neg Hx     Diabetes Neg Hx     Glaucoma Neg Hx     Hypertension Neg Hx     Macular degeneration Neg Hx     Retinal detachment Neg Hx     Strabismus Neg Hx     Thyroid disease Neg Hx     Celiac disease Neg Hx     Colon cancer Neg Hx     Colon polyps Neg Hx     Crohn's disease Neg Hx     Esophageal cancer Neg Hx     Inflammatory bowel disease Neg Hx     Liver disease Neg Hx     Rectal cancer Neg Hx     Stomach cancer Neg Hx     Ulcerative colitis Neg Hx     Cystic fibrosis Neg Hx     Hemochromatosis Neg Hx     Irritable bowel syndrome Neg Hx     Harmeet's disease Neg Hx     Multiple sclerosis Neg Hx     Tuberculosis Neg Hx     Rheum arthritis Neg Hx     Scleroderma Neg Hx     Lymphoma Neg Hx        Social History     Socioeconomic History    Marital status:      Spouse name: Not on file    Number of children: Not on file    Years of education: Not on file    Highest education level: Not on file   Occupational History    Occupation:      Employer: [employer]   Social Needs    Financial resource strain: Not very hard    Food insecurity     Worry: Never true     Inability: Never true    Transportation needs     Medical: No     Non-medical: No   Tobacco  Use    Smoking status: Never Smoker    Smokeless tobacco: Never Used   Substance and Sexual Activity    Alcohol use: No     Frequency: Never     Drinks per session: Patient refused     Binge frequency: Never     Comment: Infrequently drinks one beer.      Drug use: No    Sexual activity: Yes     Partners: Female     Comment:     Lifestyle    Physical activity     Days per week: 5 days     Minutes per session: 30 min    Stress: Rather much   Relationships    Social connections     Talks on phone: Twice a week     Gets together: Never     Attends Yazdanism service: Not on file     Active member of club or organization: No     Attends meetings of clubs or organizations: Never     Relationship status:    Other Topics Concern    Patient feels they ought to cut down on drinking/drug use Not Asked    Patient annoyed by others criticizing their drinking/drug use Not Asked    Patient has felt bad or guilty about drinking/drug use Not Asked    Patient has had a drink/used drugs as an eye opener in the AM Not Asked   Social History Narrative           Review of patient's allergies indicates:   Allergen Reactions    Cat/feline products Anaphylaxis     Difficulty breathing    Cigarette smoke Shortness Of Breath     Difficulty breathing       Physical Exam:  There were no vitals filed for this visit.  There was no physical exam done for this virtual visit    Imaging:  MRI Lumbar Spine Without Contrast 08/05/2020  FINDINGS:Alignment: Normal.   Vertebrae: Normal marrow signal. No fracture.   Discs: Mild intervertebral disc space narrowing at level of T12-L1 and L1-L2.   Cord: Normal.  Conus terminates at L1   Degenerative findings:   L1-L2: Unremarkable.   L2-L3: Unremarkable.   L3-L4: Left-sided ligamentum flavum hypertrophy with facet joint spurring (axial series 9, image 24) abutting the left posterior aspect of the thecal sac.  No spinal canal stenosis or neural foraminal narrowing.    L4-L5: Mild diffuse disc bulging with annular fissure and no spinal canal stenosis.  Bilateral facet joint hypertrophy resulting in mild bilateral neural foraminal narrowing.   L5-S1: Bilateral facet joint hypertrophy, left more than right, with left-sided 4 mm synovial cyst abutting the exiting nerve root of L5 (axial series 9, image 36).  Mild left-sided neural foraminal narrowing.  No spinal canal stenosis.   Upper sacrum and sacroiliac joints are unremarkable.   Paraspinal muscles & soft tissues: Unremarkable.     Impression:     1. Multilevel spondylosis with no significant spinal canal stenosis, as above.  2. Annular fissure is identified at the level of L4-L5.  3. Synovial cyst is identified at the level L5-S1 abutting the exiting nerve root of L5 with mild left-sided neural foraminal narrowing.         Labs:  BMP  Lab Results   Component Value Date     07/17/2020    K 4.5 07/17/2020     07/17/2020    CO2 25 07/17/2020    BUN 13 07/17/2020    CREATININE 1.1 07/17/2020    CALCIUM 9.8 07/17/2020    ANIONGAP 9 07/17/2020    ESTGFRAFRICA >60.0 07/17/2020    EGFRNONAA >60.0 07/17/2020     Lab Results   Component Value Date    ALT 70 (H) 07/17/2020    AST 58 (H) 07/17/2020    ALKPHOS 88 07/17/2020    BILITOT 1.1 (H) 07/17/2020       Assessment:  Problem List Items Addressed This Visit        Neuro    Annular tear of lumbar disc    Lumbar disc disease with radiculopathy    Chronic pain      Other Visit Diagnoses     Lumbar radiculopathy    -  Primary    Acute right-sided low back pain with right-sided sciatica        DDD (degenerative disc disease), lumbar              8/7/20 - Jl MAGAÑA Teodora is a 50 y.o. male with chronic low back pain with intermittent flare-ups now presenting with new radicular symptoms affecting the right lower extremity.  MRI of the lumbar spine shows degenerative disc disease at multiple levels; however, there is an annular tear at L4-5 with a paracentral disc bulge which is  likely contacting the descending L5 nerve root, hence the radiating pattern in the L5 dermatome on the right leg.  He is also describing symptoms affecting the L4 dermatome, which is likely due to extrusion of the disc contents at that level causing chemical neuritis.  Patient was advised of these MRI findings and educated on the underlying pathology producing his symptoms.  I have recommended a combination of home exercise, epidural steroid injection, and non opioid medications.  Medications will include gabapentin and tizanidine, both of which will be use primarily at night when he experiences the majority of his symptoms.  We also discussed the role of weight loss and reducing lumbar disc strain.    08/25/[id number]-cbdf-old male presents status post right TF ERNESTO targeting L4-5 patient reported 50% relief with this point.  Patient  continues to have right knee, right calf and right foot pain which actually feels worse his pain is constant described as spasms, burning and shooting.  Is currently taking gabapentin 400 mg t.i.d., and tizanidine 4 mg b.i.d. p.r.n..  He and I discussed that considering he is only 7 days postop this procedure that I recommend giving injection more time to be effective.  Also discussed that we may add the L5-S1 level if we considered  repeating the injection MRI does show L5-S1 bilateral facet joint hypertrophy, left more than right with left-sided for mm synovial cyst abutting the existing nerve root of L5. The dermatome distribution of where he describes his pain would cover L5-S1 ie calf, and foot.     : Reviewed and consistent with medication use as prescribed.    Treatment Plan:   Procedures: none at this time considering repeating injection including adding L5-S1 level. Previous injection was L4-5   PT/OT/HEP: Discussed core strengthening HE program  Medications:    - continue Tizanidine 4 mg BID PRN   - continue Gabapentin 400 mg PO QID   Labs: reviewed and medications are  appropriately dosed for current hepatorenal function.  Imaging: No additional recommended at this time.    Follow Up: Patient to leave me a message in 7 days  via his my chart with % of relief and pain score, at that we will determine plan of care.     Brandon Michaels NP-C  Interventional Pain Management    Disclaimer: This note was partly generated using dictation software which may occasionally result in transcription errors.

## 2020-09-02 ENCOUNTER — TELEPHONE (OUTPATIENT)
Dept: PAIN MEDICINE | Facility: CLINIC | Age: 50
End: 2020-09-02

## 2020-09-02 DIAGNOSIS — M54.16 LUMBAR RADICULOPATHY: Primary | ICD-10-CM

## 2020-09-02 NOTE — TELEPHONE ENCOUNTER
----- Message from MAXINE Matos sent at 9/1/2020 11:29 AM CDT -----  Regarding: Procedure   Please scheduled for a repeat Right L4-5  and  L5-S1 TF ERNESTO.  I added an additional level to this procedure.      Thank you

## 2020-09-02 NOTE — TELEPHONE ENCOUNTER
Pt scheduled for 9/8/20 at 0800 am for Right TFESI. Pt aware to check in at registration desk on the first floor of the hospital for 0700 am. Pt denied taking blood thinners and is not diabetic.

## 2020-09-02 NOTE — DISCHARGE INSTRUCTIONS
Home Care Instructions Pain Management:    1.  DIET:    You may resume your normal diet today.    2.  BATHING:    You may shower with luke warm water.    3.  DRESSING:    You may remove your bandage today.    4.  ACTIVITY LEVEL:      You may resume your normal activities 24 hours after your procedure.    5.  MEDICATIONS:    You may resume your normal medications today.    6.  SPECIAL INSTRUCTIONS:    No heat to the injection site for 24 hours including bath or shower, heating pad, moist heat or hot tubs.    Use an ice pack to the injection site for any pain or discomfort.  Apply ice packs for 20 minute intervals as needed.    If you have received any sedatives by mouth today, you can not drive for 12 hours.    If you have received sedation through an IV, you can not drive for 24 hours.    PLEASE CALL YOUR DOCTOR FOR THE FOLLOWIN.  Redness or swelling around the injection site.  2.  Fever of 101 degrees.  3.  Drainage (pus) from the injection site.  4.  For any continuous bleeding (some dried blood over the incision is normal.)    FOR EMERGENCIES:    If any unusual problems or difficulties occur during clinic hours, call [phone] or dial [phone].    Follow up with with your physician in 2-3 weeks.

## 2020-09-08 ENCOUNTER — HOSPITAL ENCOUNTER (OUTPATIENT)
Facility: HOSPITAL | Age: 50
Discharge: HOME | End: 2020-09-08
Attending: PAIN MEDICINE | Admitting: PAIN MEDICINE
Payer: COMMERCIAL

## 2020-09-08 VITALS
HEIGHT: 69 IN | HEART RATE: 87 BPM | RESPIRATION RATE: 16 BRPM | SYSTOLIC BLOOD PRESSURE: 160 MMHG | OXYGEN SATURATION: 96 % | DIASTOLIC BLOOD PRESSURE: 105 MMHG | WEIGHT: 270 LBS | TEMPERATURE: 98 F | BODY MASS INDEX: 39.99 KG/M2

## 2020-09-08 DIAGNOSIS — G89.29 CHRONIC PAIN: ICD-10-CM

## 2020-09-08 DIAGNOSIS — M51.16 LUMBAR DISC DISEASE WITH RADICULOPATHY: Primary | ICD-10-CM

## 2020-09-08 LAB — POCT GLUCOSE: 131 MG/DL (ref 70–110)

## 2020-09-08 PROCEDURE — 64483 NJX AA&/STRD TFRM EPI L/S 1: CPT | Performed by: PAIN MEDICINE

## 2020-09-08 PROCEDURE — 64484 NJX AA&/STRD TFRM EPI L/S EA: CPT | Performed by: PAIN MEDICINE

## 2020-09-08 PROCEDURE — 25500020 PHARM REV CODE 255: Performed by: PAIN MEDICINE

## 2020-09-08 PROCEDURE — 64483 NJX AA&/STRD TFRM EPI L/S 1: CPT | Mod: RT,,, | Performed by: PAIN MEDICINE

## 2020-09-08 PROCEDURE — 64483 PR EPIDURAL INJ, ANES/STEROID, TRANSFORAMINAL, LUMB/SACR, SNGL LEVL: ICD-10-PCS | Mod: RT,,, | Performed by: PAIN MEDICINE

## 2020-09-08 PROCEDURE — 63600175 PHARM REV CODE 636 W HCPCS: Performed by: PAIN MEDICINE

## 2020-09-08 PROCEDURE — 25000003 PHARM REV CODE 250: Performed by: PAIN MEDICINE

## 2020-09-08 RX ORDER — SODIUM BICARBONATE 1 MEQ/ML
SYRINGE (ML) INTRAVENOUS
Status: DISCONTINUED | OUTPATIENT
Start: 2020-09-08 | End: 2020-09-08 | Stop reason: HOSPADM

## 2020-09-08 RX ORDER — DEXAMETHASONE SODIUM PHOSPHATE 10 MG/ML
INJECTION INTRAMUSCULAR; INTRAVENOUS
Status: DISCONTINUED | OUTPATIENT
Start: 2020-09-08 | End: 2020-09-08 | Stop reason: HOSPADM

## 2020-09-08 RX ORDER — BUPIVACAINE HYDROCHLORIDE 2.5 MG/ML
INJECTION, SOLUTION EPIDURAL; INFILTRATION; INTRACAUDAL
Status: DISCONTINUED | OUTPATIENT
Start: 2020-09-08 | End: 2020-09-08 | Stop reason: HOSPADM

## 2020-09-08 RX ORDER — LIDOCAINE HYDROCHLORIDE 10 MG/ML
INJECTION INFILTRATION; PERINEURAL
Status: DISCONTINUED | OUTPATIENT
Start: 2020-09-08 | End: 2020-09-08 | Stop reason: HOSPADM

## 2020-09-08 NOTE — DISCHARGE SUMMARY
[facility]  Discharge Note  Short Stay     Admit Date: 9/8/2020    Discharge Date: 9/8/2020     Attending Physician: Fernando Cabezas Jr, MD    Diagnoses:  Active Hospital Problems    Diagnosis  POA    Chronic pain [G89.29]  Yes      Resolved Hospital Problems   No resolved problems to display.     Discharged Condition: Good     Hospital Course: Patient was admitted for an outpatient interventional pain management procedure and tolerated the procedure well with no complications.     Final Diagnoses: Same as principal problem.     Disposition: Home or Self Care     Follow up/Patient Instructions:    Follow-up Information     Fernando Cabezas Jr, MD. Go in 2 weeks.    Specialty: Pain Medicine  Why: Post-procedural Follow Up As Scheduled, Call to make an appointment if you do not have one  Contact information:  [address]  [address]  [place] [id number] [phone]                   Reconciled Medications:     Medication List      CONTINUE taking these medications    acetaminophen 500 MG tablet  Commonly known as: TYLENOL  Take 2 tablets (1,000 mg total) by mouth 3 (three) times daily.     buPROPion 150 MG TB24 tablet  Commonly known as: WELLBUTRIN XL  Take 1 tablet (150 mg total) by mouth once daily.     busPIRone 15 MG tablet  Commonly known as: BUSPAR  Take 1 tablet (15 mg total) by mouth 2 (two) times daily.     flash glucose sensor Kit  Commonly known as: FREESTYLE AMANDEEP 14 DAY SENSOR  1 each by Misc.(Non-Drug; Combo Route) route every 14 (fourteen) days.     gabapentin 400 MG capsule  Commonly known as: NEURONTIN  Take 1 capsule (400 mg total) by mouth 3 (three) times daily.     losartan 50 MG tablet  Commonly known as: COZAAR  Take 1 tablet (50 mg total) by mouth once daily.     mesalamine 0.375 gram Cp24  Commonly known as: APRISO  TAKE 4 CAPSULES(1.5 GRAMS) BY MOUTH EVERY DAY     metFORMIN 500 MG ER 24hr tablet  Commonly known as: GLUCOPHAGE-XR  Take 2 tablets (1,000 mg total) by mouth  once daily.     naproxen sodium 220 MG tablet  Commonly known as: ALEVE  Take 2 tablets (440 mg total) by mouth 2 (two) times daily with meals.     pravastatin 20 MG tablet  Commonly known as: PRAVACHOL  TAKE 1 TABLET(20 MG) BY MOUTH EVERY DAY FOR CHOLESTEROL     sildenafiL 50 MG tablet  Commonly known as: VIAGRA  Take 1 tablet (50 mg total) by mouth daily as needed for Erectile Dysfunction.     tiZANidine 4 MG tablet  Commonly known as: ZANAFLEX  Take 1 tablet (4 mg total) by mouth 2 (two) times daily as needed.     traMADoL 50 mg tablet  Commonly known as: ULTRAM  Take 1 tablet (50 mg total) by mouth every 12 (twelve) hours as needed for Pain.           Discharge Procedure Orders (must include Diet, Follow-up, Activity)   Call MD for:  temperature >100.4     Call MD for:  severe uncontrolled pain     Call MD for:  redness, tenderness, or signs of infection (pain, swelling, redness, odor or green/yellow discharge around incision site)     Call MD for:  difficulty breathing or increased cough     Call MD for:  severe persistent headache     Call MD for:  worsening rash     Remove dressing in 24 hours       Fernando Cabezas Jr, MD  Interventional Pain Medicine / Anesthesiology

## 2020-09-08 NOTE — PROGRESS NOTES
Patients vital signs normal. D/C instructions reviewed with patient. Patient verbalized understanding.

## 2020-09-08 NOTE — OP NOTE
"Procedure Note    Pre-operative Diagnosis: Lumbar Radiculopathy  Post-operative Diagnosis: Lumbar Radiculopathy  Procedure Date: 09/08/2020  Procedure:  (1) Lumbar Transforaminal Epidural Steroid Injection, Two Levels    (2) Intraoperative fluoroscopy          Indications: To alleviate pain and suffering, and reduce functional impairment associated with Lumbar radiculopathy.      The patients history and physical exam were reviewed. The risks, benefits and alternatives to the procedure were discussed, and all questions were answered to the patients satisfaction. The patient agreed to proceed, and written informed consent was verified.    Procedure in Detail: Using a fenestrated drape and Chloro-prep, the skin was prepared and draped in sterile fashion. The Right L5-S1 and S1 neural foramena were identified by fluoroscopy by Right lateral oblique angle. A mixture of Lidocaine 1% 4 mL + Sodium Bicarbonate 1 mL was used to anesthetize the skin at the skin entry point and subcutaneous tissue with 25G 1.5" in needle. Then a 22G 5" spinal needle was advanced under intermittent fluoroscopy toward each target point. Fluoroscopy was then inspected from lateral and AP view and needle adjusted appropriately. There was no paresthesia with needle placement. Aspiration was negative for blood and CSF. Omnipaque contrast was injected live in an AP fluoroscopic view at each level demonstrating appropriate needle position with contrast spread into the nerve root sheath and medially into the epidural space without intravascular or intrathecal spread. Next, a mixture 1.5 mL PF Bupivacaine 0.25% and 15 mg dexamethasone (total 3 mL) was divided evenly between the two levels and injected slowly and incrementally. The patient tolerated the procedure without complaint and was transported to the recovery room in stable condition.     Disposition: The patient tolerated the procedure well, and there were no apparent complications. Vital " signs remained stable throughout the procedure. The patient was taken to the recovery area where written discharge instructions for the procedure were given.     Follow-up: RTC as scheduled      Fernando Cabezas Jr, MD  Interventional Pain Medicine / Anesthesiology

## 2020-09-08 NOTE — INTERVAL H&P NOTE
No significant changes were noted from the H&P or last clinic note though, after consultation with the patient this morning, we have elected to move the injections levels down from L4-5 and L5-S1 to L5-S1 and S1 to better cover pain in the foot likely being transmitted along the L5 and S1 nerve roots.  Patient understands that the risks are unchanged.    The risks and benefits of this intervention, and alternative therapies were discussed with the patient.  The discussion of risks included infection, bleeding, need for additional procedures or surgery, nerve damage, paralysis, adverse medication reaction(s), stroke, and/or death.  Questions regarding the procedure, risks, expected outcome, and possible side effects were solicited and answered to the patient's satisfaction.  Jl Araiza wishes to proceed with the injection or procedure.  Written consent was obtained.    Fernando Cabezas Jr, MD  Interventional Pain Medicine / Anesthesiology

## 2020-09-14 ENCOUNTER — PATIENT OUTREACH (OUTPATIENT)
Dept: ADMINISTRATIVE | Facility: OTHER | Age: 50
End: 2020-09-14

## 2020-09-14 NOTE — PROGRESS NOTES
Health Maintenance Due   Topic Date Due    HIV Screening  03/01/1985    Shingles Vaccine (1 of 2) 03/01/2019    Foot Exam  12/14/2019    Influenza Vaccine (1) 08/01/2020     Updates were requested from care everywhere.  Chart was reviewed for overdue Proactive [facility] ([facility]) topics (CRS, Breast Cancer Screening, Eye exam)  Health Maintenance has been updated.  LINKS immunization registry triggered.  Immunizations were reconciled.

## 2020-09-15 ENCOUNTER — OFFICE VISIT (OUTPATIENT)
Dept: PAIN MEDICINE | Facility: CLINIC | Age: 50
End: 2020-09-15
Payer: COMMERCIAL

## 2020-09-15 DIAGNOSIS — M51.16 LUMBAR DISC DISEASE WITH RADICULOPATHY: Primary | ICD-10-CM

## 2020-09-15 DIAGNOSIS — G89.4 CHRONIC PAIN SYNDROME: ICD-10-CM

## 2020-09-15 DIAGNOSIS — M54.41 ACUTE RIGHT-SIDED LOW BACK PAIN WITH RIGHT-SIDED SCIATICA: ICD-10-CM

## 2020-09-15 DIAGNOSIS — M51.36 ANNULAR TEAR OF LUMBAR DISC: ICD-10-CM

## 2020-09-15 PROCEDURE — 99214 PR OFFICE/OUTPT VISIT, EST, LEVL IV, 30-39 MIN: ICD-10-PCS | Mod: 95,,, | Performed by: NURSE PRACTITIONER

## 2020-09-15 PROCEDURE — 99214 OFFICE O/P EST MOD 30 MIN: CPT | Mod: 95,,, | Performed by: NURSE PRACTITIONER

## 2020-09-15 NOTE — PROGRESS NOTES
[facility] established Patient Evaluation  telemedicine Encounter    Telemedicine Bundle:  This visit was completed during the Coronavirus Crisis to enhance patient safety.  The patient location is: patient's home  The chief complaint leading to consultation is:  Continued right knee, calf and right foot pain.  Visit type: Virtual visit with synchronous audio and video  Total time spent with patient: 20 minutes  Each patient to whom he or she provides medical services by telemedicine is:    (1) informed of the relationship between the physician and patient and the respective role of any other health care provider with respect to management of the patient  (2) notified that he or she may decline to receive medical services by telemedicine and may withdraw from such care at any time.      Referred by: Dr Corine Taylor  Reason for referral: back pain    CC:   Right calf and foot pain  Last 3 PDI Scores 8/7/2020   Pain Disability Index (PDI) 21       HPI:   Jl Araiza is a 50 y.o. male who complains of back pain shooting into the LLE.  He reports chronic low back pain for many years with episodic flare up, but this episode is worse that before and is associated with new symptoms of pain shooting into the right lateral, buttock, thigh, and leg terminating in the toes.  Pain also shoots into the anterior thigh and he reports several episodes of brief weakness where his knee gave out.    Interval updates    09/15/2937-53-uehi-old male presents status post  Right L5-S1 and S1 TF ERNESTO patient reporting 75% relief, however he continues to report mild pain in his right calf and intermittent pain in his lateral and bottom of his right foot also reports numbness in his toes.  He states he is able to sleep better at night continues to take the gabapentin 100 mg t.i.d. and Tylenol 500 to a 1000 as needed for pain he reports Aleve due to history of colitis states that his pain is at a constant 2/3/10 but at  times can get to a 5/10.  He is also experiencing weakness in his right leg and ankle.     08/25/[id number]-ldlg-old male presents virtually he is  status post right  TFESI @ L4-5 patient reported 50% relief at this point patient states he continues to have right knee, right calf and right foot pain which actually feels worse his pain is constant described as spasms, burning and shooting.    Location: back pain   Onset: 2 weeks ago  Current Pain Score: 3/10  Daily Pain of Range: 3-8/10  Quality: Dull, Throbbing, Tingling, Numb and Sharp  Radiation: right leg   Worsened by: lying down, sitting  Improved by: nothing    Previous Therapies:  PT/OT: Denies  HEP: Endorses intermittent exercise.  Interventions: Denies  Surgery: Denies back surgery  Medications:   - NSAIDS:   - MSK Relaxants:   - TCAs:   - SNRIs:   - Topicals:   - Anticonvulsants:  - Opioids:     Current Pain Medications:  1. Tramadol     Review of Systems:  Review of Systems   Constitutional: Negative for chills and fever.   HENT: Negative for nosebleeds.    Eyes: Negative for blurred vision.   Respiratory: Negative for hemoptysis.    Cardiovascular: Negative for chest pain and palpitations.   Gastrointestinal: Negative for heartburn and vomiting.   Genitourinary: Negative for hematuria.   Musculoskeletal: Positive for falls, joint pain and neck pain. Negative for myalgias.   Skin: Negative for rash.   Neurological: Positive for tingling (RLE) and weakness (RLE). Negative for seizures and loss of consciousness.   Endo/Heme/Allergies: Does not bruise/bleed easily.   Psychiatric/Behavioral: Negative for hallucinations. The patient has insomnia (shooting pain in leg is worse at night).        History:    Current Outpatient Medications:     acetaminophen (TYLENOL) 500 MG tablet, Take 2 tablets (1,000 mg total) by mouth 3 (three) times daily., Disp: , Rfl: 0    buPROPion (WELLBUTRIN XL) 150 MG TB24 tablet, Take 1 tablet (150 mg total) by mouth once daily.,  Disp: 30 tablet, Rfl: 5    busPIRone (BUSPAR) 15 MG tablet, Take 1 tablet (15 mg total) by mouth 2 (two) times daily., Disp: 60 tablet, Rfl: 5    flash glucose sensor (FREESTYLE AMANDEEP 14 DAY SENSOR) Kit, 1 each by Misc.(Non-Drug; Combo Route) route every 14 (fourteen) days., Disp: 2 kit, Rfl: 11    gabapentin (NEURONTIN) 400 MG capsule, Take 1 capsule (400 mg total) by mouth 3 (three) times daily., Disp: 90 capsule, Rfl: 1    losartan (COZAAR) 50 MG tablet, Take 1 tablet (50 mg total) by mouth once daily., Disp: 30 tablet, Rfl: 5    mesalamine (APRISO) 0.375 gram Cp24, TAKE 4 CAPSULES(1.5 GRAMS) BY MOUTH EVERY DAY, Disp: 120 capsule, Rfl: 0    metFORMIN (GLUCOPHAGE-XR) 500 MG XR 24hr tablet, Take 2 tablets (1,000 mg total) by mouth once daily., Disp: 60 tablet, Rfl: 5    naproxen sodium (ALEVE) 220 MG tablet, Take 2 tablets (440 mg total) by mouth 2 (two) times daily with meals., Disp: , Rfl:     pravastatin (PRAVACHOL) 20 MG tablet, TAKE 1 TABLET(20 MG) BY MOUTH EVERY DAY FOR CHOLESTEROL, Disp: 30 tablet, Rfl: 5    sildenafil (VIAGRA) 50 MG tablet, Take 1 tablet (50 mg total) by mouth daily as needed for Erectile Dysfunction., Disp: 6 tablet, Rfl: 5    tiZANidine (ZANAFLEX) 4 MG tablet, Take 1 tablet (4 mg total) by mouth 2 (two) times daily as needed., Disp: 30 tablet, Rfl: 0    traMADoL (ULTRAM) 50 mg tablet, Take 1 tablet (50 mg total) by mouth every 12 (twelve) hours as needed for Pain., Disp: 14 tablet, Rfl: 0    Past Medical History:   Diagnosis Date    Abdominal hernia     Anxiety     Essential hypertension 11/2/2015    Hyperlipidemia     Obese     UC (ulcerative colitis)        Past Surgical History:   Procedure Laterality Date    COLONOSCOPY N/A 6/15/2016    Procedure: COLONOSCOPY;  Surgeon: Edmund Marie MD;  Location: [address];  Service: Endoscopy;  Laterality: N/A;  Patient reports that during his last Colonoscopy, he did not fall asleep.    COLONOSCOPY N/A 8/4/2017     Procedure: COLONOSCOPY;  Surgeon: Mya Booth MD;  [facility]  Service: Endoscopy;  Laterality: N/A;  schedule as 45 minute case    COLONOSCOPY N/A 8/22/2019    Procedure: COLONOSCOPY;  Surgeon: Edmund Marie MD;  [facility]  Service: Endoscopy;  Laterality: N/A;    ESOPHAGOGASTRODUODENOSCOPY      HERNIA REPAIR  Umbilical Hernia    TRANSFORAMINAL EPIDURAL INJECTION OF STEROID Right 8/18/2020    Procedure: Injection,steroid,epidural,transforaminal approach--Right L4-5;  Surgeon: Fernando Cabezas Jr., MD;  Location: [facility];  Service: Pain Management;  Laterality: Right;    TRANSFORAMINAL EPIDURAL INJECTION OF STEROID Right 9/8/2020    Procedure: Injection,steroid,epidural,transforaminal approach--right L5-S1 and S1;  Surgeon: Fernando Cabezas Jr., MD;  Location: [facility];  Service: Pain Management;  Laterality: Right;    VASECTOMY         Family History   Problem Relation Age of Onset    Cancer Mother         Liver    Alcohol abuse Mother     Liver cancer Mother 54        ETOH liver disease    Cirrhosis Mother 54        ETOH liver disease    Alcohol abuse Father     Cushing syndrome Father     No Known Problems Sister     Stroke Paternal Grandfather     No Known Problems Maternal Aunt     Liver cancer Maternal Uncle     No Known Problems Paternal Aunt     No Known Problems Paternal Uncle     No Known Problems Maternal Grandmother     No Known Problems Maternal Grandfather     No Known Problems Paternal Grandmother     Amblyopia Neg Hx     Blindness Neg Hx     Cataracts Neg Hx     Diabetes Neg Hx     Glaucoma Neg Hx     Hypertension Neg Hx     Macular degeneration Neg Hx     Retinal detachment Neg Hx     Strabismus Neg Hx     Thyroid disease Neg Hx     Celiac disease Neg Hx     Colon cancer Neg Hx     Colon polyps Neg Hx     Crohn's disease Neg Hx     Esophageal cancer Neg Hx     Inflammatory bowel disease Neg Hx      Liver disease Neg Hx     Rectal cancer Neg Hx     Stomach cancer Neg Hx     Ulcerative colitis Neg Hx     Cystic fibrosis Neg Hx     Hemochromatosis Neg Hx     Irritable bowel syndrome Neg Hx     Harmeet's disease Neg Hx     Multiple sclerosis Neg Hx     Tuberculosis Neg Hx     Rheum arthritis Neg Hx     Scleroderma Neg Hx     Lymphoma Neg Hx        Social History     Socioeconomic History    Marital status:      Spouse name: Not on file    Number of children: Not on file    Years of education: Not on file    Highest education level: Not on file   Occupational History    Occupation:      Employer: [employer]   Social Needs    Financial resource strain: Not very hard    Food insecurity     Worry: Never true     Inability: Never true    Transportation needs     Medical: No     Non-medical: No   Tobacco Use    Smoking status: Never Smoker    Smokeless tobacco: Never Used   Substance and Sexual Activity    Alcohol use: No     Frequency: Never     Drinks per session: Patient refused     Binge frequency: Never     Comment: Infrequently drinks one beer.      Drug use: No    Sexual activity: Yes     Partners: Female     Comment:     Lifestyle    Physical activity     Days per week: 5 days     Minutes per session: 30 min    Stress: Rather much   Relationships    Social connections     Talks on phone: Twice a week     Gets together: Never     Attends [employer] service: Not on file     Active member of club or organization: No     Attends meetings of clubs or organizations: Never     Relationship status:    Other Topics Concern    Patient feels they ought to cut down on drinking/drug use Not Asked    Patient annoyed by others criticizing their drinking/drug use Not Asked    Patient has felt bad or guilty about drinking/drug use Not Asked    Patient has had a drink/used drugs as an eye opener in the AM Not Asked   Social History Narrative            Review of patient's allergies indicates:   Allergen Reactions    Cat/feline products Anaphylaxis     Difficulty breathing    Cigarette smoke Shortness Of Breath     Difficulty breathing       Physical Exam:  There were no vitals filed for this visit.  There was no physical exam done for this virtual visit    Imaging:  MRI Lumbar Spine Without Contrast 08/05/2020  FINDINGS:Alignment: Normal.   Vertebrae: Normal marrow signal. No fracture.   Discs: Mild intervertebral disc space narrowing at level of T12-L1 and L1-L2.   Cord: Normal.  Conus terminates at L1   Degenerative findings:   L1-L2: Unremarkable.   L2-L3: Unremarkable.   L3-L4: Left-sided ligamentum flavum hypertrophy with facet joint spurring (axial series 9, image 24) abutting the left posterior aspect of the thecal sac.  No spinal canal stenosis or neural foraminal narrowing.   L4-L5: Mild diffuse disc bulging with annular fissure and no spinal canal stenosis.  Bilateral facet joint hypertrophy resulting in mild bilateral neural foraminal narrowing.   L5-S1: Bilateral facet joint hypertrophy, left more than right, with left-sided 4 mm synovial cyst abutting the exiting nerve root of L5 (axial series 9, image 36).  Mild left-sided neural foraminal narrowing.  No spinal canal stenosis.   Upper sacrum and sacroiliac joints are unremarkable.   Paraspinal muscles & soft tissues: Unremarkable.     Impression:     1. Multilevel spondylosis with no significant spinal canal stenosis, as above.  2. Annular fissure is identified at the level of L4-L5.  3. Synovial cyst is identified at the level L5-S1 abutting the exiting nerve root of L5 with mild left-sided neural foraminal narrowing.         Labs:  BMP  Lab Results   Component Value Date     07/17/2020    K 4.5 07/17/2020     07/17/2020    CO2 25 07/17/2020    BUN 13 07/17/2020    CREATININE 1.1 07/17/2020    CALCIUM 9.8 07/17/2020    ANIONGAP 9 07/17/2020    ESTGFRAFRICA >60.0 07/17/2020     EGFRNONAA >60.0 07/17/2020     Lab Results   Component Value Date    ALT 70 (H) 07/17/2020    AST 58 (H) 07/17/2020    ALKPHOS 88 07/17/2020    BILITOT 1.1 (H) 07/17/2020       Assessment:  Problem List Items Addressed This Visit        Neuro    Annular tear of lumbar disc    Lumbar disc disease with radiculopathy - Primary      Other Visit Diagnoses     Acute right-sided low back pain with right-sided sciatica              8/7/20 - Jl Araiza is a 50 y.o. male with chronic low back pain with intermittent flare-ups now presenting with new radicular symptoms affecting the right lower extremity.  MRI of the lumbar spine shows degenerative disc disease at multiple levels; however, there is an annular tear at L4-5 with a paracentral disc bulge which is likely contacting the descending L5 nerve root, hence the radiating pattern in the L5 dermatome on the right leg.  He is also describing symptoms affecting the L4 dermatome, which is likely due to extrusion of the disc contents at that level causing chemical neuritis.  Patient was advised of these MRI findings and educated on the underlying pathology producing his symptoms.  I have recommended a combination of home exercise, epidural steroid injection, and non opioid medications.  Medications will include gabapentin and tizanidine, both of which will be use primarily at night when he experiences the majority of his symptoms.  We also discussed the role of weight loss and reducing lumbar disc strain.    08/25/[id number]-tjne-old male presents status post right TF ERNESTO targeting L4-5 patient reported 50% relief with this point.  Patient  continues to have right knee, right calf and right foot pain which actually feels worse his pain is constant described as spasms, burning and shooting.  Is currently taking gabapentin 400 mg t.i.d., and tizanidine 4 mg b.i.d. p.r.n..  He and I discussed that considering he is only 7 days postop this procedure that I recommend giving  injection more time to be effective.  Also discussed that we may add the L5-S1 level if we considered  repeating the injection MRI does show L5-S1 bilateral facet joint hypertrophy, left more than right with left-sided for mm synovial cyst abutting the existing nerve root of L5. The dermatome distribution of where he describes his pain would cover L5-S1 ie calf, and foot.     09/15/9800-38-mvyn-old male presents status post  Right L5-S1 and S1 TF ERNESTO patient reporting 75% relief, however he continues to report mild pain in his right calf and intermittent pain in his lateral and bottom of his right foot also reports numbness in his toes.  His pain is secondary to  L5-S1 bilateral facet joint hypertrophy, left more than right with left-sided for synovial cyst abutting the existing nerve root of L5.  I have recommended physical therapy, continue gabapentin 400 mg q.i.d. were also recommend a compound cream that contains probably cane, Lidocaine, and Gabapentin to well upon the right lateral foot b.i.d..      : Reviewed and consistent with medication use as prescribed.    Treatment Plan:   Procedures: none at this time considering repeating injection including adding L5-S1 level. Previous injection was L4-5   PT/OT/HEP:  Referral to physical therapy today  Medications:    - continue Tizanidine 4 mg BID PRN   - continue Gabapentin 400 mg PO QID    -prescription for compound cream today  Labs: reviewed and medications are appropriately dosed for current hepatorenal function.  Imaging: No additional recommended at this time.    Follow Up:  4-6 weeks via virtual or in clinic to discuss effectiveness of physical therapy    SHARON Camarena  [facility]    Disclaimer: This note was partly generated using dictation software which may occasionally result in transcription errors.

## 2020-09-18 ENCOUNTER — PATIENT MESSAGE (OUTPATIENT)
Dept: PRIMARY CARE CLINIC | Facility: CLINIC | Age: 50
End: 2020-09-18

## 2020-09-18 DIAGNOSIS — I10 ESSENTIAL HYPERTENSION: ICD-10-CM

## 2020-09-18 RX ORDER — LOSARTAN POTASSIUM 100 MG/1
100 TABLET ORAL DAILY
Qty: 30 TABLET | Refills: 5 | Status: SHIPPED | OUTPATIENT
Start: 2020-09-18 | End: 2021-04-07

## 2020-09-22 DIAGNOSIS — K51.90 ULCERATIVE COLITIS WITHOUT COMPLICATIONS, UNSPECIFIED LOCATION: ICD-10-CM

## 2020-09-22 RX ORDER — MESALAMINE 0.38 G/1
1.5 CAPSULE, EXTENDED RELEASE ORAL DAILY
Qty: 120 CAPSULE | Refills: 2 | Status: SHIPPED | OUTPATIENT
Start: 2020-09-22 | End: 2020-12-22 | Stop reason: SDUPTHER

## 2020-09-24 ENCOUNTER — CLINICAL SUPPORT (OUTPATIENT)
Dept: REHABILITATION | Facility: OTHER | Age: 50
End: 2020-09-24
Payer: COMMERCIAL

## 2020-09-24 DIAGNOSIS — R53.1 DECREASED RANGE OF MOTION WITH DECREASED STRENGTH: ICD-10-CM

## 2020-09-24 DIAGNOSIS — M54.41 ACUTE RIGHT-SIDED LOW BACK PAIN WITH RIGHT-SIDED SCIATICA: ICD-10-CM

## 2020-09-24 DIAGNOSIS — M54.16 RIGHT LUMBAR RADICULITIS: ICD-10-CM

## 2020-09-24 DIAGNOSIS — Z74.09 IMPAIRED FUNCTIONAL MOBILITY, BALANCE, GAIT, AND ENDURANCE: ICD-10-CM

## 2020-09-24 DIAGNOSIS — M51.36 ANNULAR TEAR OF LUMBAR DISC: ICD-10-CM

## 2020-09-24 DIAGNOSIS — M25.60 DECREASED RANGE OF MOTION WITH DECREASED STRENGTH: ICD-10-CM

## 2020-09-24 DIAGNOSIS — M51.16 LUMBAR DISC DISEASE WITH RADICULOPATHY: ICD-10-CM

## 2020-09-24 PROCEDURE — 97112 NEUROMUSCULAR REEDUCATION: CPT | Mod: PN

## 2020-09-24 PROCEDURE — 97163 PT EVAL HIGH COMPLEX 45 MIN: CPT | Mod: PN

## 2020-09-25 NOTE — PLAN OF CARE
[facility]  Physical Therapy Initial Evaluation    Date: 9/24/2020   Name: Jl Araiza  Clinic Number: [id number]    Therapy Diagnosis:   Encounter Diagnoses   Name Primary?    Lumbar disc disease with radiculopathy     Acute right-sided low back pain with right-sided sciatica     Annular tear of lumbar disc     Right lumbar radiculitis     Impaired functional mobility, balance, gait, and endurance     Decreased range of motion with decreased strength      Physician: Brandon Michaels FNP    Physician Orders: PT Eval and Treat   Medical Diagnosis from Referral:   M51.16 (ICD-10-CM) - Lumbar disc disease with radiculopathy   M54.41 (ICD-10-CM) - Acute right-sided low back pain with right-sided sciatica   M51.36 (ICD-10-CM) - Annular tear of lumbar disc       Evaluation Date: 9/24/2020  Authorization Period Expiration: 12/31/2020  Plan of Care Expiration: 12/24/2020  Visit # / Visits authorized: 1/ 20    Time In: 11:15  Time Out: 12:15  Total Appointment Time (timed & untimed codes): 60 minutes    Precautions: Standard    Subjective   Date of onset: June 2020  History of current condition - Jl reports: Acute onset of R low back pain with immediate onset of R ;eg pain that started in June 2020, where he slipped and fell, landing on butt, when attempting [place] move furniture [place]. Noted immediate difficulty with weight bearing through RLE.    Following that fall he felt his R leg start to get weaker. Had another slip and fall several months later when he felt his R leg buckle underneath him. Now new onset of R leg sciatica. Had several injections that have reduce pain by 90%.     Continues to have RLE weakness, numbness in R great toe, and mild back pain with difficulty during weight bearing activities, including walking and stair climbing, and feels his balance is off. Feels leg sway and attempt to buckle, but denies recent falls. Prior to original incident  no issues with back or leg pian. Hx of R Knee injury in high school, L ankle sprain.    Denies saddle paresthesias, B/B changes.    Medical History:   Past Medical History:   Diagnosis Date    Abdominal hernia     Anxiety     Essential hypertension 11/2/2015    Hyperlipidemia     Obese     UC (ulcerative colitis)        Surgical History:   Jl Araiza  has a past surgical history that includes Hernia repair (Umbilical Hernia); Colonoscopy (N/A, 6/15/2016); Esophagogastroduodenoscopy; Vasectomy; Colonoscopy (N/A, 8/4/2017); Colonoscopy (N/A, 8/22/2019); Transforaminal epidural injection of steroid (Right, 8/18/2020); and Transforaminal epidural injection of steroid (Right, 9/8/2020).    Medications:   Jl has a current medication list which includes the following prescription(s): acetaminophen, bupropion, buspirone, flash glucose sensor, gabapentin, losartan, mesalamine, metformin, naproxen sodium, pravastatin, sildenafil, tizanidine, and tramadol.    Allergies:   Review of patient's allergies indicates:   Allergen Reactions    Cat/feline products Anaphylaxis     Difficulty breathing    Cigarette smoke Shortness Of Breath     Difficulty breathing        Imaging, MRI studies, July 2020, lumbar spine:   1. Multilevel spondylosis with no significant spinal canal stenosis, as above.  2. Annular fissure is identified at the level of L4-L5.  3. Synovial cyst is identified at the level L5-S1 abutting the exiting nerve root of L5 with mild left-sided neural foraminal narrowing.    Prior Therapy: none for c/c  Social History: 2 story home - [place], wife and 12 y.o son (goes to [employer])  Occupation:  for [employer]  Prior Level of Function: independent  Current Level of Function: pain and difficulty with all functional activities    Pain:  Current 2/10, worst 5/10, best 2/10   Location: right low back and RLE   Description: numbness in R great toe (tip of PIP and 2nd toe, great toe MTP -  plantar surface). Tightness over foot/ankle/lower leg, weakness in RLE/knee    Aggravating Factors: Sitting, Standing, Bending, Walking, Lifting, Getting out of bed/chair and stair climbing, lifting leg to get in/out of car  Easing Factors: medication, rest, series of 5 steriod injections    Patients goals: be able to walk and climb stairs without difficulty    Objective     Postural examination/scapula alignment: body habitus, increased posterior innominate rotation on R, flat back, visible quad atrophy on R LE  Sensory deficit: mild in lateral R calf  Reflexes: WFL    Thoracic/Lumbar AROM: Pain/Dysfunction with Movement:   Flexion Min zuñiga, fingertips to mid-tibia, increased trunk lean to LLE, decreased lordosis, no c/o LBP   Extension Mod zuñiga, decreased lordosis, decreased hip ext, no c/o LBP   Right side bending Min zuñiga, no c/o LBP   Left side bending Min zuñiga, no c/o LBP   Right rotation Mod zuñiga, no c/o LBP   Left rotation Mod zuñiga, no c/o LBP     Trunk strength: poor    Hip ROM: active = mod zuñiga, passive = WNL  Knee ROM: active = mod zuñiga, passive = WNL    Lower Extremity Strength  Right LE  Left LE    Hip flexion: 3+/5 Hip flexion: 5/5   Hip extension:  3+/5 Hip extension: 5/5   Hip abduction: 3/5 Hip abduction: 5/5   Hip adduction:  3+/5 Hip adduction:  5/5   Hip Internal rotation   3/5 Hip Internal rotation 5/5   Knee Flexion 4/5 Knee Flexion 5/5   Knee Extension 3-/5 Knee Extension 5/5   Ankle dorsiflexion: 3-/5 Ankle dorsiflexion: 5/5   Ankle plantarflexion: 3+/5 Ankle plantarflexion: 5/5     Flexibility:   Alexi test   Hip flexors: NT   Quads: NT  Seema test   ITB: NT  Hamstring (SLR): R <45 deg, L = 70 deg    Joint Mobility: hypo LSP    Special Tests:   Test Name  Test Result   Prone Instability Test NT   Lumbar Quadrant test (--)   Straight Leg Raise (+)   Neural Tension Test (Slump) (+)   Crossed Straight Leg Raise (--)   Walking on toes (--)   Walking on heels  (+)   Clonus (--)   FLORENCE NT   FADIR NT  "  SI Joint Provocation Test (compression / distraction) NT     Functional Movement Analysis:   Gait: I, antalgic, increased hip/knee flex with decreased ankle DF during swing phase  Squat: increased WS to LLE, increased anterior translation of R knee past toes  Balance: SLS - L <10" with mod sway and LOB, R = UA without LOB      Limitation/Restriction for FOTO lumbar spine Survey    Therapist reviewed FOTO scores for Jl Araiza on 9/24/2020.   FOTO documents entered into [employer] - see Media section.    Limitation Score: 46%         TREATMENT   Treatment Time In: 11:45  Treatment Time Out: 12:15  Total Treatment time (time-based codes) separate from Evaluation: 30 minutes    Jl received the following manual therapy techniques: 5 min x Toure taping to R patella for medial glide, medial tilt. Patient received education regarding appropriate care and removal of Toure tape. Patient instructed in proper removal techniques if skin irritation occurs.     Jl participated in neuromuscular re-education activities to improve: Balance, Coordination, Kinesthetic, Sense, Proprioception and Posture for 25 minutes.  Seated toe raises kimberlee 20x 3" holds  Seated toe raises unilat 10 x 10" holds  LAQ (sciatic n. Glide) x 20  Sit to stands x 15  SLS with HHA 3 x 30"      Home Exercises and Patient Education Provided    Education provided:   - Patient educated regarding surgical procedure, pathogenesis, diagnosis, protocol, prognosis, POC, and HEP. Written Home Exercises Provided with written and verbal instructions for frequency and duration of the following exercises: see list above. Pt educated on HEP and activity modifications to reduce c/o pain and improve overall function.     - Pt was educated in posture and body mechanics.  Use of a lumbar roll was recommended and demonstrated here today.  Purchase information provided.     - Pt also educated on use of modalities prn to reduce c/o pain and dysfunction.     Written " Home Exercises Provided: yes.  Exercises were reviewed and Jl was able to demonstrate them prior to the end of the session.  Jl demonstrated good  understanding of the education provided.     See EMR under Patient Instructions for exercises provided 9/24/2020.    Assessment   Jl is a 50 y.o. male referred to outpatient Physical Therapy with a medical diagnosis of lumbar disc disease with radiculopathy, acute R low back pain and annular tear of lumbar disc. Patient presents with marked limitations in ROM, flexibility, strength, motor control/coordionatoin and postural strength and endurance, with possible neural tension as a result referring diagnosis. Impairments limit pt with all functional activities, including safety with stair climbing and walking.    Patient prognosis is Good.   Patientt will benefit from skilled outpatient Physical Therapy to address the deficits stated above and in the chart below, provide patient /family education, and to maximize patientt's level of independence.     Plan of care discussed with patient: Yes  Patient's spiritual, cultural and educational needs considered and patient is agreeable to the plan of care and goals as stated below:     Anticipated Barriers for therapy: standard, recent discal injury     Medical Necessity is demonstrated by the following  History  Co-morbidities and personal factors that may impact the plan of care Co-morbidities:   coping style/mechanism, difficulty sleeping, excessive commute time/distance, financial considerations, level of undertstanding of current condition and recent discal injury    Personal Factors:   coping style  social background  lifestyle     high   Examination  Body Structures and Functions, activity limitations and participation restrictions that may impact the plan of care Body Regions:   back  lower extremities  trunk    Body Systems:    gross symmetry  ROM  strength  gross coordinated  movement  balance  gait  transfers  transitions  motor control  motor learning  neural tension, flexibility, postural strength and endurance    Participation Restrictions:   ADLs, HHCs, stair climbing at home, self care, , work activities    Activity limitations:   Learning and applying knowledge  no deficits    General Tasks and Commands  no deficits    Communication  no deficits    Mobility  lifting and carrying objects  walking  driving (bike, car, motorcycle)    Self care  washing oneself (bathing, drying, washing hands)  caring for body parts (brushing teeth, shaving, grooming)  toileting  looking after one's health    Domestic Life  shopping  cooking  doing house work (cleaning house, washing dishes, laundry)  assisting others    Interactions/Relationships  no deficits    Life Areas  no deficits    Community and Social Life  community life  recreation and leisure         high   Clinical Presentation unstable clinical presentation with unpredictable characteristics high   Decision Making/ Complexity Score: high     Goals:  Short Term Goals (6 Weeks):  1. Pt able to stand >=30 minutes with ADLs with mod difficulty.  2. Pt able to walk >=30 minutes with household chores with mod difficulty.  3.  Pt able to transfer in/out of chairs of various heights with mod difficulty.  5. Pt able to ascend/descend curb, independently, 1 handrail, with mod difficulty.  6. Pt to demonstrate improved functional ability with FOTO limitation <=36% disability.    Long Term Goals (12 Weeks):  1. Pt able to stand >=1 hour with with ADLs with min-no difficulty.  2. Pt able to walk >=1 hour with household chores with min-no difficulty.  3.  Pt able to squat/lift 5# from floor to waist with min-no difficulty.  4. Pt able to ascend/descend 1 flight of stairs, independently, 1 handrail, with min-no difficulty.  5. Pt will be independent with HEP and self management of symptoms.   6. Pt to demonstrate improved functional ability  with FOTO limitation <=26% disability.      Plan   Plan of care Certification: 9/24/2020 to 12/24/2020.    Outpatient Physical Therapy 2 times weekly for 12 weeks to include the following interventions: Aquatic Therapy, Cervical/Lumbar Traction, Electrical Stimulation prn, Gait Training, Iontophoresis (with dexamethasone prn), Manual Therapy, Moist Heat/ Ice, Neuromuscular Re-ed, Patient Education, Self Care, Therapeutic Activites, Therapeutic Exercise and IASTYM, therapeutic taping, dry needling, cupping. Progress HEP towards D/C. Recommend F/U with MD if symptoms worsen or do not resolve. Patient may be seen by a PTA for treatment to carry out their plan of care.  Face-to-face conferences will be held.       Sharon Partida, PT

## 2020-09-29 ENCOUNTER — OFFICE VISIT (OUTPATIENT)
Dept: OPTOMETRY | Facility: CLINIC | Age: 50
End: 2020-09-29
Payer: COMMERCIAL

## 2020-09-29 DIAGNOSIS — I10 ESSENTIAL HYPERTENSION: ICD-10-CM

## 2020-09-29 DIAGNOSIS — Z13.5 SCREENING FOR EYE CONDITION: ICD-10-CM

## 2020-09-29 DIAGNOSIS — E11.9 TYPE 2 DIABETES MELLITUS WITHOUT RETINOPATHY: ICD-10-CM

## 2020-09-29 DIAGNOSIS — H52.223 REGULAR ASTIGMATISM OF BOTH EYES: ICD-10-CM

## 2020-09-29 DIAGNOSIS — Z01.00 DIABETIC EYE EXAM: Primary | ICD-10-CM

## 2020-09-29 DIAGNOSIS — E11.9 DIABETIC EYE EXAM: Primary | ICD-10-CM

## 2020-09-29 DIAGNOSIS — H52.4 PRESBYOPIA OF BOTH EYES: ICD-10-CM

## 2020-09-29 PROCEDURE — 92014 COMPRE OPH EXAM EST PT 1/>: CPT | Mod: S$GLB,,, | Performed by: OPTOMETRIST

## 2020-09-29 PROCEDURE — 99999 PR PBB SHADOW E&M-EST. PATIENT-LVL III: CPT | Mod: PBBFAC,,, | Performed by: OPTOMETRIST

## 2020-09-29 PROCEDURE — 99999 PR PBB SHADOW E&M-EST. PATIENT-LVL III: ICD-10-PCS | Mod: PBBFAC,,, | Performed by: OPTOMETRIST

## 2020-09-29 PROCEDURE — 92014 PR EYE EXAM, EST PATIENT,COMPREHESV: ICD-10-PCS | Mod: S$GLB,,, | Performed by: OPTOMETRIST

## 2020-09-29 PROCEDURE — 92015 DETERMINE REFRACTIVE STATE: CPT | Mod: S$GLB,,, | Performed by: OPTOMETRIST

## 2020-09-29 PROCEDURE — 92015 PR REFRACTION: ICD-10-PCS | Mod: S$GLB,,, | Performed by: OPTOMETRIST

## 2020-09-29 NOTE — PATIENT INSTRUCTIONS
Good ocular health in both eyes.  Type 2 (currently insulin-dependant) type 2 diabetes.   No evidence of diabetic retinal changes in either eye.     Astigmatic refractive error in each eye, with good best - correctable visual acuity in each eye.  Presbyopia consistent with age.  New spectacle lens Rx issued for use as desired.   Recommend full-time wear.  No compelling need to replace lenses at this time, if happy with [facility] with present glasses.   Repeat general eye examination and refraction in one year.

## 2020-09-29 NOTE — PROGRESS NOTES
"HPI     Diabetic Eye Exam      Additional comments: Diabetic eye exam and refraction.  NIDDM x 5 years +/-.  Takes Metformin.  Pt states no changges with vision              Comments     Patient's age: 50 y.o. WM  Approximate date of last eye examination:  02/01/2019  Name of last eye doctor seen:  Dr Fine  City/State:  [facility]  Wears glasses? Yes     wears  full time  Present glasses are bifocal / S V Distance / S V Reading: Progressive lens     Approximate age of present glasses:  1+ years   Got new glasses following last exam, or subsequently?: yes   Any problem with [facility] with glasses?  No problems  Wears CLs?:  No  Headaches? No  Eye pain/discomfort?  No                                                                                       Flashes?  No  Floaters?  Rarely  Diplopia/Double vision?  No  Patient's Ocular History:         Any eye surgeries? No         Any eye injury?  No         Any treatment for eye disease?  No  Family history of eye disease?  No  Significant patient medical history:         1. Diabetes?  Yes, two days ago BS was between        If yes, IDDM or NIDDM?  Metformin    2. HBP?  Yes, controlled with medication              3. Other (describe):  None   ! OTC eyedrops currently using:  No   ! Prescription eye meds currently using:  No   ! Any history of allergy/adverse reaction to any eye meds used   previously?  No    ! Any history of allergy/adverse reaction to eyedrops used during prior   eye exam(s)? No    ! Any history of allergy/adverse reaction to Novacaine or similar meds?   No   ! Any history of allergy/adverse reaction to Epinephrine or similar meds?   No    ! Patient okay with use of anesthetic eyedrops to check eye pressure?    Yes        ! Patient okay with use of eyedrops to dilate pupils today?  Yes    !  Allergies/Medications/Medical History/Family History reviewed today?    Yes        PD =   72/68  Desired reading distance =  16"  Computer distance = 23"               "                                                          Last edited by Noman Fine, OD on 9/29/2020  8:36 AM. (History)            Assessment /Plan     For exam results, see Encounter Report.    1. Diabetic eye exam     2. Type 2 diabetes mellitus without retinopathy     3. Regular astigmatism of both eyes     4. Presbyopia of both eyes     5. Essential hypertension     6. Screening for eye condition                    Good ocular health in both eyes.  Type 2 (currently insulin-dependant) type 2 diabetes.   No evidence of diabetic retinal changes in either eye.     Astigmatic refractive error in each eye, with good best - correctable visual acuity in each eye.  Presbyopia consistent with age.  New spectacle lens Rx issued for use as desired.   Recommend full-time wear.  No compelling need to replace lenses at this time, if happy with [facility] with present glasses.   Repeat general eye examination and refraction in one year.

## 2020-09-30 ENCOUNTER — PATIENT MESSAGE (OUTPATIENT)
Dept: PRIMARY CARE CLINIC | Facility: CLINIC | Age: 50
End: 2020-09-30

## 2020-10-07 ENCOUNTER — CLINICAL SUPPORT (OUTPATIENT)
Dept: REHABILITATION | Facility: OTHER | Age: 50
End: 2020-10-07
Payer: COMMERCIAL

## 2020-10-07 DIAGNOSIS — Z74.09 IMPAIRED FUNCTIONAL MOBILITY, BALANCE, GAIT, AND ENDURANCE: ICD-10-CM

## 2020-10-07 DIAGNOSIS — M25.60 DECREASED RANGE OF MOTION WITH DECREASED STRENGTH: ICD-10-CM

## 2020-10-07 DIAGNOSIS — M54.16 RIGHT LUMBAR RADICULITIS: ICD-10-CM

## 2020-10-07 DIAGNOSIS — R53.1 DECREASED RANGE OF MOTION WITH DECREASED STRENGTH: ICD-10-CM

## 2020-10-07 PROCEDURE — 97112 NEUROMUSCULAR REEDUCATION: CPT | Mod: PN

## 2020-10-07 PROCEDURE — 97110 THERAPEUTIC EXERCISES: CPT | Mod: PN

## 2020-10-07 NOTE — PROGRESS NOTES
"  Physical Therapy Treatment Note     Name: Jl Araiza  Clinic Number: [id number]    Therapy Diagnosis:   Encounter Diagnoses   Name Primary?    Right lumbar radiculitis     Impaired functional mobility, balance, gait, and endurance     Decreased range of motion with decreased strength      Physician: Brandon Michaels FNP    Visit Date: 10/7/2020    Physician Orders: PT Eval and Treat   Medical Diagnosis from Referral:   M51.16 (ICD-10-CM) - Lumbar disc disease with radiculopathy   M54.41 (ICD-10-CM) - Acute right-sided low back pain with right-sided sciatica   M51.36 (ICD-10-CM) - Annular tear of lumbar disc         Evaluation Date: 9/24/2020  Authorization Period Expiration: 12/31/2020  Plan of Care Expiration: 12/24/2020  Visit # / Visits authorized: 2/ 20     Time In: 7:00  Time Out: 7:50  Total Appointment Time (timed & untimed codes): 50 minutes     Precautions: Standard      Subjective     Pt reports: noted more pain on dorsum of foot and all aspects of the R great toe. Despite increased c/o pain says hcu he has been compliant with HEP and notes improved leg lifting ROM and increased strength in RLE with sit to stands.  He was compliant with home exercise program.  Response to previous treatment: good  Functional change: improved sit to stands with less WS to LLE    Pain: 4/10  Location: right low back and RLE - R great toe and dorsum of foot today    Objective     Jl received therapeutic exercises to develop strength, endurance, ROM, flexibility, posture and core stabilization for 20 minutes including:  Seated great toe flex and ext stretch: 3 x 20" ea  Supine hip flexor stretch 3 x 30"  Seated toe curls x 20  Self STM to quads/ITB with rolling pin x 2'  Golf ball roll out for plantar fascia x 2'    Jl received the following manual therapy techniques:   5 min x joint mobs to 1st ray, MTP  00 min x Toure taping to R patella for medial glide, medial tilt. -- pt declined today    Jl " "participated in neuromuscular re-education activities to improve: Balance, Coordination, Kinesthetic, Sense, Proprioception and Posture for 15 minutes.  Elliptical: 3 minutes   Seated toe raises kimberlee 20x 3" holds - NP  Seated toe raises unilat 10 x 10" holds - NP  LAQ (sciatic n. [place]) x 20 - with ball squeeze today  Sit to stands x 15  SLS with HHA 3 x 30"       Home Exercises Provided and Patient Education Provided     Education provided:   - added self STM with golf ball to plantar fascia, rolling pin to R quads, ITB - 10/7/2020    Written Home Exercises Provided: Patient instructed to cont prior HEP.  Exercises were reviewed and Jl was able to demonstrate them prior to the end of the session.  Jl demonstrated good  understanding of the education provided.     See EMR under Patient Instructions for exercises provided prior visit.    Assessment     Good return technique with HEP, indicating good compliance at home. Presents with increased TTP to R plantar fascia, posterior tib and lateral aspect of foot. Decreased myofascial mobility of ITB and quads. Noted improvement following self STM.  Jl is progressing well towards his goals.   Pt prognosis is Good.     Pt will continue to benefit from skilled outpatient physical therapy to address the deficits listed in the problem list box on initial evaluation, provide pt/family education and to maximize pt's level of independence in the home and community environment.     Pt's spiritual, cultural and educational needs considered and pt agreeable to plan of care and goals.     Anticipated barriers to physical therapy: standard, recent discal injury     Goals:   Short Term Goals (6 Weeks):  1. Pt able to stand >=30 minutes with ADLs with mod difficulty.  2. Pt able to walk >=30 minutes with household chores with mod difficulty.  3.  Pt able to transfer in/out of chairs of various heights with mod difficulty.  5. Pt able to ascend/descend curb, independently, 1 " handrail, with mod difficulty.  6. Pt to demonstrate improved functional ability with FOTO limitation <=36% disability.     Long Term Goals (12 Weeks):  1. Pt able to stand >=1 hour with with ADLs with min-no difficulty.  2. Pt able to walk >=1 hour with household chores with min-no difficulty.  3.  Pt able to squat/lift 5# from floor to waist with min-no difficulty.  4. Pt able to ascend/descend 1 flight of stairs, independently, 1 handrail, with min-no difficulty.  5. Pt will be independent with HEP and self management of symptoms.   6. Pt to demonstrate improved functional ability with FOTO limitation <=26% disability.    Plan     Continue with POC for myofascial mobility, ROM, flexibility, strength, and stability.  Plan of care Certification: 9/24/2020 to 12/24/2020. -- PN due by 10/24/2020    Sharon Partida, PT

## 2020-10-13 NOTE — PROGRESS NOTES
"  Physical Therapy Treatment Note     Name: Jl Araiza  Clinic Number: [id number]    Therapy Diagnosis:   Encounter Diagnoses   Name Primary?    Right lumbar radiculitis     Impaired functional mobility, balance, gait, and endurance     Decreased range of motion with decreased strength      Physician: Brandon Michaels FNP    Visit Date: 10/14/2020    Physician Orders: PT Eval and Treat   Medical Diagnosis from Referral:   M51.16 (ICD-10-CM) - Lumbar disc disease with radiculopathy   M54.41 (ICD-10-CM) - Acute right-sided low back pain with right-sided sciatica   M51.36 (ICD-10-CM) - Annular tear of lumbar disc         Evaluation Date: 9/24/2020  Authorization Period Expiration: 12/31/2020  Plan of Care Expiration: 12/24/2020  Visit # / Visits authorized: 3/ 20     Time In: 7:00  Time Out: 7:55  Total Appointment Time (timed & untimed codes): 55 minutes     Precautions: Standard      Subjective     Pt reports: "my knee feels weaker after repeated ex and that makes him nervous to walk and climb inclines." He says that his motion is getting better despite c/o weakness. Dorsal foot pain has improved by 25% since previous visit. Also notes reduced tightness and muscle tone above knee since he is rolling out his muscles more at home.    Says that he took his BP this morning and it was very high - 153/105. He denies sx other than neck tightness. Has already reached out to MD this AM. He reports feeling fine and is ok to exercise today. He does not want to do the elliptical or more intense exercise today.    He was compliant with home exercise program.  Response to previous treatment: good  Functional change: improved sit to stands with less WS to LLE    Pain: 4/10  Location: right low back and RLE - R great toe and dorsum of foot today    Objective     Jl received therapeutic exercises to develop strength, endurance, ROM, flexibility, posture and core stabilization for 25 minutes including:    **Exercises in BOLD " "performed today**    Seated great toe flex and ext stretch: 3 x 20" ea  Supine hip flexor stretch 3 x 30"  Seated toe curls x 20  +marble pick ups x 1  Self STM to quads/ITB with rolling pin x 2'  Golf ball roll out for plantar fascia x 2'  +incline calf stretch 1 x 90"    Jl received the following manual therapy techniques:   00 min x joint mobs to 1st ray, MTP  00 min x Toure taping to R patella for medial glide, medial tilt. -- pt declined today    Jl participated in neuromuscular re-education activities to improve: Balance, Coordination, Kinesthetic, Sense, Proprioception and Posture for 30 minutes.    **Exercises in BOLD performed today**    Elliptical: 3 minutes - NP  Seated toe raises kimberlee 20x 3" holds - NP  Seated toe raises unilat x 20   +Quad set 10 x 10"  +SLR w/ QS 2 x 10  +SL hip abd 2 x 10  +bridge 2 x 10  LAQ (sciatic n. [place]) x 20 - with ball squeeze today  Sit to stands x 10  SLS with HHA 3 x 30" - able w/o HHA today  +tandem stance (R leg posterior) w/o airex 2 x 30" - no HHA         Home Exercises Provided and Patient Education Provided     Education provided:   - added self STM with golf ball to plantar fascia, rolling pin to R quads, ITB - 10/7/2020    Written Home Exercises Provided: Patient instructed to cont prior HEP.  Exercises were reviewed and Jl was able to demonstrate them prior to the end of the session.  Jl demonstrated good  understanding of the education provided.     See EMR under Patient Instructions for exercises provided prior visit.    Assessment     Pt presents with increased muscular juddering from great toe to knee with increased fatigue. Fatigues quickly with all therex. Good quad activation but difficulty maintaining with mild quad lag after 1 set of SLR. Demjeanne's improving balance as he is able to maintain SLS with 1 fingertip assist today; able to maintain tandem stance without HHA today.    Jl is progressing well towards his goals.   Pt prognosis is " Good.     Pt will continue to benefit from skilled outpatient physical therapy to address the deficits listed in the problem list box on initial evaluation, provide pt/family education and to maximize pt's level of independence in the home and community environment.     Pt's spiritual, cultural and educational needs considered and pt agreeable to plan of care and goals.     Anticipated barriers to physical therapy: standard, recent discal injury     Goals:   Short Term Goals (6 Weeks):  1. Pt able to stand >=30 minutes with ADLs with mod difficulty.  2. Pt able to walk >=30 minutes with household chores with mod difficulty.  3.  Pt able to transfer in/out of chairs of various heights with mod difficulty.  5. Pt able to ascend/descend curb, independently, 1 handrail, with mod difficulty.  6. Pt to demonstrate improved functional ability with FOTO limitation <=36% disability.     Long Term Goals (12 Weeks):  1. Pt able to stand >=1 hour with with ADLs with min-no difficulty.  2. Pt able to walk >=1 hour with household chores with min-no difficulty.  3.  Pt able to squat/lift 5# from floor to waist with min-no difficulty.  4. Pt able to ascend/descend 1 flight of stairs, independently, 1 handrail, with min-no difficulty.  5. Pt will be independent with HEP and self management of symptoms.   6. Pt to demonstrate improved functional ability with FOTO limitation <=26% disability.    Plan     Continue with POC for myofascial mobility, ROM, flexibility, strength, and stability.  Plan of care Certification: 9/24/2020 to 12/24/2020. -- PN due by 10/24/2020    Sharon Partida, PT

## 2020-10-14 ENCOUNTER — CLINICAL SUPPORT (OUTPATIENT)
Dept: REHABILITATION | Facility: OTHER | Age: 50
End: 2020-10-14
Payer: COMMERCIAL

## 2020-10-14 DIAGNOSIS — M25.60 DECREASED RANGE OF MOTION WITH DECREASED STRENGTH: ICD-10-CM

## 2020-10-14 DIAGNOSIS — M54.16 RIGHT LUMBAR RADICULITIS: ICD-10-CM

## 2020-10-14 DIAGNOSIS — R53.1 DECREASED RANGE OF MOTION WITH DECREASED STRENGTH: ICD-10-CM

## 2020-10-14 DIAGNOSIS — Z74.09 IMPAIRED FUNCTIONAL MOBILITY, BALANCE, GAIT, AND ENDURANCE: ICD-10-CM

## 2020-10-14 PROCEDURE — 97112 NEUROMUSCULAR REEDUCATION: CPT | Mod: PN

## 2020-10-14 PROCEDURE — 97110 THERAPEUTIC EXERCISES: CPT | Mod: PN

## 2020-10-26 ENCOUNTER — LAB VISIT (OUTPATIENT)
Dept: LAB | Facility: HOSPITAL | Age: 50
End: 2020-10-26
Attending: INTERNAL MEDICINE
Payer: COMMERCIAL

## 2020-10-26 ENCOUNTER — PATIENT MESSAGE (OUTPATIENT)
Dept: PRIMARY CARE CLINIC | Facility: CLINIC | Age: 50
End: 2020-10-26

## 2020-10-26 DIAGNOSIS — E11.9 TYPE 2 DIABETES MELLITUS WITHOUT COMPLICATION, WITHOUT LONG-TERM CURRENT USE OF INSULIN: ICD-10-CM

## 2020-10-26 LAB
ALBUMIN SERPL BCP-MCNC: 3.8 G/DL (ref 3.5–5.2)
ALP SERPL-CCNC: 83 U/L (ref 55–135)
ALT SERPL W/O P-5'-P-CCNC: 44 U/L (ref 10–44)
ANION GAP SERPL CALC-SCNC: 10 MMOL/L (ref 8–16)
AST SERPL-CCNC: 25 U/L (ref 10–40)
BILIRUB SERPL-MCNC: 1.3 MG/DL (ref 0.1–1)
BUN SERPL-MCNC: 16 MG/DL (ref 6–20)
CALCIUM SERPL-MCNC: 9.4 MG/DL (ref 8.7–10.5)
CHLORIDE SERPL-SCNC: 103 MMOL/L (ref 95–110)
CO2 SERPL-SCNC: 24 MMOL/L (ref 23–29)
CREAT SERPL-MCNC: 1.1 MG/DL (ref 0.5–1.4)
EST. GFR  (AFRICAN AMERICAN): >60 ML/MIN/1.73 M^2
EST. GFR  (NON AFRICAN AMERICAN): >60 ML/MIN/1.73 M^2
ESTIMATED AVG GLUCOSE: 157 MG/DL (ref 68–131)
GLUCOSE SERPL-MCNC: 126 MG/DL (ref 70–110)
HBA1C MFR BLD HPLC: 7.1 % (ref 4–5.6)
POTASSIUM SERPL-SCNC: 4.4 MMOL/L (ref 3.5–5.1)
PROT SERPL-MCNC: 7.2 G/DL (ref 6–8.4)
SODIUM SERPL-SCNC: 137 MMOL/L (ref 136–145)

## 2020-10-26 PROCEDURE — 80053 COMPREHEN METABOLIC PANEL: CPT

## 2020-10-26 PROCEDURE — 83036 HEMOGLOBIN GLYCOSYLATED A1C: CPT

## 2020-10-26 PROCEDURE — 36415 COLL VENOUS BLD VENIPUNCTURE: CPT | Mod: PN

## 2020-10-26 RX ORDER — METFORMIN HYDROCHLORIDE 500 MG/1
1500 TABLET, EXTENDED RELEASE ORAL DAILY
Qty: 90 TABLET | Refills: 5 | Status: SHIPPED | OUTPATIENT
Start: 2020-10-26 | End: 2021-08-09

## 2020-10-27 ENCOUNTER — PATIENT OUTREACH (OUTPATIENT)
Dept: ADMINISTRATIVE | Facility: HOSPITAL | Age: 50
End: 2020-10-27

## 2020-10-27 NOTE — PROGRESS NOTES
Chart Auditing Tool   Topic Results    Care Everywhere Completed    Reconcile Immunizations Completed    Care Team Completed    Care Coordination Note/Care Mgt Note Completed    HM Review Completed    Orders Placed N/A    Outreach Performed Not Completed    Appointments scheduled Already Scheduled     Chart Audit Complete: Notes:  Margaret Small LPN  Lead Clinical Care Coordinator   [facility]

## 2020-10-28 ENCOUNTER — CLINICAL SUPPORT (OUTPATIENT)
Dept: REHABILITATION | Facility: OTHER | Age: 50
End: 2020-10-28
Payer: COMMERCIAL

## 2020-10-28 DIAGNOSIS — M25.60 DECREASED RANGE OF MOTION WITH DECREASED STRENGTH: ICD-10-CM

## 2020-10-28 DIAGNOSIS — Z74.09 IMPAIRED FUNCTIONAL MOBILITY, BALANCE, GAIT, AND ENDURANCE: ICD-10-CM

## 2020-10-28 DIAGNOSIS — R53.1 DECREASED RANGE OF MOTION WITH DECREASED STRENGTH: ICD-10-CM

## 2020-10-28 DIAGNOSIS — M54.16 RIGHT LUMBAR RADICULITIS: ICD-10-CM

## 2020-10-28 PROCEDURE — 97110 THERAPEUTIC EXERCISES: CPT | Mod: PN

## 2020-10-28 PROCEDURE — 97112 NEUROMUSCULAR REEDUCATION: CPT | Mod: PN

## 2020-10-28 PROCEDURE — 97012 MECHANICAL TRACTION THERAPY: CPT | Mod: PN

## 2020-10-28 NOTE — PROGRESS NOTES
"  Physical Therapy Treatment Note     Name: Jl Araiza  Clinic Number: [id number]    Therapy Diagnosis:   Encounter Diagnoses   Name Primary?    Right lumbar radiculitis     Impaired functional mobility, balance, gait, and endurance     Decreased range of motion with decreased strength      Physician: Brandon Michaels FNP    Visit Date: 10/28/2020    Physician Orders: PT Eval and Treat   Medical Diagnosis from Referral:   M51.16 (ICD-10-CM) - Lumbar disc disease with radiculopathy   M54.41 (ICD-10-CM) - Acute right-sided low back pain with right-sided sciatica   M51.36 (ICD-10-CM) - Annular tear of lumbar disc         Evaluation Date: 9/24/2020  Authorization Period Expiration: 12/31/2020  Plan of Care Expiration: 12/24/2020  Visit # / Visits authorized: 3/ 20     Time In: 7:00  Time Out: 8:10  Total Appointment Time (timed & untimed codes): 70 minutes     Precautions: Standard      Subjective     Pt reports: that he discovered his High BP is a reaction to a medication for his foot and has since stopped taking it. Says that his leg was so weak and tired after previous visit he had several episodes of buckling, but denies falls.     He was compliant with home exercise program.  Response to previous treatment: good  Functional change: improved sit to stands with less WS to LLE    Pain: 4/10  Location: right low back and RLE - R great toe and dorsum of foot today    Objective     Jl received therapeutic exercises to develop strength, endurance, ROM, flexibility, posture and core stabilization for 15 minutes including:    **Exercises in BOLD performed today**    Seated great toe flex and ext stretch: 3 x 20" ea  Supine hip flexor stretch 3 x 30"  Seated toe curls x 20  marble pick ups x 1  Self STM to quads/ITB with rolling pin x 2'  Golf ball roll out for plantar fascia x 2'  incline calf stretch 1 x 90"  +seated ball roll out 3-way x 10 ea direction    Jl received the following manual therapy techniques: " "  00 min x joint mobs to 1st ray, MTP  00 min x Toure taping to R patella for medial glide, medial tilt. -- pt declined today    Jl participated in neuromuscular re-education activities to improve: Balance, Coordination, Kinesthetic, Sense, Proprioception and Posture for 40 minutes.    **Exercises in BOLD performed today**    Elliptical: 3 minutes - NP  Seated toe raises kimberlee 20x 3" holds - NP  Seated toe raises unilat x 20   Quad set 10 x 10"  SLR w/ QS 2 x 10  SL hip abd 2 x 10  bridge 2 x 10  LAQ (sciatic n. [place]) x 20 - with ball squeeze today  Sit to stands x 10  SLS with HHA 3 x 30" - able w/o HHA today  tandem stance (R leg posterior) w/o airex 2 x 30" - no HHA  +standing 3-way hip x 10 ea direction - stand on RLE  +standing kimberlee heel raises x 15   +standing kimberlee toe raises x 15  +lateral step up 1 x 5 x 2" step  +TA activation 2 min x 5" holds  +TA w/ kimberlee OH flex x 20  +TA with BKFO x 10    Modalities: Jl received intermittent mechanical traction to the lumbar spine at a force of 85 pounds for a total of 15 minutes. Hold time of 40 seconds and rest time for 20 seconds. Patient tolerated treatment well without any adverse effects.    Consider dry needling and mechanical traction next   Home Exercises Provided and Patient Education Provided     Education provided:   - added self STM with golf ball to plantar fascia, rolling pin to R quads, ITB - 10/7/2020    Written Home Exercises Provided: Patient instructed to cont prior HEP.  Exercises were reviewed and Jl was able to demonstrate them prior to the end of the session.  Jl demonstrated good  understanding of the education provided.     See EMR under Patient Instructions for exercises provided prior visit.    Assessment     Initiated mechanical traction due to s/s of radicular pain from lower LSP into the lateral calf and foot. Fair tolerance with reduced calf pain, but noted worsening of digits 1 and 2 pain. Progressed CKC exercises for strength " and balance. Low reps as quad muscle fatigues easily and demo's increased buckling.    Jl is progressing well towards his goals.   Pt prognosis is Good.     Pt will continue to benefit from skilled outpatient physical therapy to address the deficits listed in the problem list box on initial evaluation, provide pt/family education and to maximize pt's level of independence in the home and community environment.     Pt's spiritual, cultural and educational needs considered and pt agreeable to plan of care and goals.     Anticipated barriers to physical therapy: standard, recent discal injury     Goals:   Short Term Goals (6 Weeks):  1. Pt able to stand >=30 minutes with ADLs with mod difficulty.  2. Pt able to walk >=30 minutes with household chores with mod difficulty.  3.  Pt able to transfer in/out of chairs of various heights with mod difficulty.  5. Pt able to ascend/descend curb, independently, 1 handrail, with mod difficulty.  6. Pt to demonstrate improved functional ability with FOTO limitation <=36% disability.     Long Term Goals (12 Weeks):  1. Pt able to stand >=1 hour with with ADLs with min-no difficulty.  2. Pt able to walk >=1 hour with household chores with min-no difficulty.  3.  Pt able to squat/lift 5# from floor to waist with min-no difficulty.  4. Pt able to ascend/descend 1 flight of stairs, independently, 1 handrail, with min-no difficulty.  5. Pt will be independent with HEP and self management of symptoms.   6. Pt to demonstrate improved functional ability with FOTO limitation <=26% disability.    Plan     Continue with POC for myofascial mobility, ROM, flexibility, strength, and stability.  Plan of care Certification: 9/24/2020 to 12/24/2020. -- PN due by 10/24/2020    Sharon Partida, PT

## 2020-11-02 ENCOUNTER — OFFICE VISIT (OUTPATIENT)
Dept: PRIMARY CARE CLINIC | Facility: CLINIC | Age: 50
End: 2020-11-02
Payer: COMMERCIAL

## 2020-11-02 ENCOUNTER — PATIENT MESSAGE (OUTPATIENT)
Dept: PHARMACY | Facility: CLINIC | Age: 50
End: 2020-11-02

## 2020-11-02 ENCOUNTER — IMMUNIZATION (OUTPATIENT)
Dept: PHARMACY | Facility: CLINIC | Age: 50
End: 2020-11-02
Payer: COMMERCIAL

## 2020-11-02 VITALS
OXYGEN SATURATION: 99 % | HEIGHT: 69 IN | RESPIRATION RATE: 19 BRPM | DIASTOLIC BLOOD PRESSURE: 80 MMHG | BODY MASS INDEX: 40 KG/M2 | WEIGHT: 270.06 LBS | HEART RATE: 84 BPM | TEMPERATURE: 98 F | SYSTOLIC BLOOD PRESSURE: 139 MMHG

## 2020-11-02 DIAGNOSIS — I10 ESSENTIAL HYPERTENSION: ICD-10-CM

## 2020-11-02 DIAGNOSIS — M51.16 LUMBAR DISC DISEASE WITH RADICULOPATHY: ICD-10-CM

## 2020-11-02 DIAGNOSIS — E78.5 HYPERLIPIDEMIA, UNSPECIFIED HYPERLIPIDEMIA TYPE: ICD-10-CM

## 2020-11-02 DIAGNOSIS — E11.9 TYPE 2 DIABETES MELLITUS WITHOUT COMPLICATION, WITHOUT LONG-TERM CURRENT USE OF INSULIN: Primary | ICD-10-CM

## 2020-11-02 DIAGNOSIS — Z23 NEED FOR SHINGLES VACCINE: ICD-10-CM

## 2020-11-02 PROCEDURE — 99999 PR PBB SHADOW E&M-EST. PATIENT-LVL IV: ICD-10-PCS | Mod: PBBFAC,,, | Performed by: INTERNAL MEDICINE

## 2020-11-02 PROCEDURE — 99214 PR OFFICE/OUTPT VISIT, EST, LEVL IV, 30-39 MIN: ICD-10-PCS | Mod: S$GLB,,, | Performed by: INTERNAL MEDICINE

## 2020-11-02 PROCEDURE — 3079F PR MOST RECENT DIASTOLIC BLOOD PRESSURE 80-89 MM HG: ICD-10-PCS | Mod: CPTII,S$GLB,, | Performed by: INTERNAL MEDICINE

## 2020-11-02 PROCEDURE — 3079F DIAST BP 80-89 MM HG: CPT | Mod: CPTII,S$GLB,, | Performed by: INTERNAL MEDICINE

## 2020-11-02 PROCEDURE — 99999 PR PBB SHADOW E&M-EST. PATIENT-LVL IV: CPT | Mod: PBBFAC,,, | Performed by: INTERNAL MEDICINE

## 2020-11-02 PROCEDURE — 3075F PR MOST RECENT SYSTOLIC BLOOD PRESS GE 130-139MM HG: ICD-10-PCS | Mod: CPTII,S$GLB,, | Performed by: INTERNAL MEDICINE

## 2020-11-02 PROCEDURE — 3008F PR BODY MASS INDEX (BMI) DOCUMENTED: ICD-10-PCS | Mod: CPTII,S$GLB,, | Performed by: INTERNAL MEDICINE

## 2020-11-02 PROCEDURE — 3008F BODY MASS INDEX DOCD: CPT | Mod: CPTII,S$GLB,, | Performed by: INTERNAL MEDICINE

## 2020-11-02 PROCEDURE — 99214 OFFICE O/P EST MOD 30 MIN: CPT | Mod: S$GLB,,, | Performed by: INTERNAL MEDICINE

## 2020-11-02 PROCEDURE — 3051F HG A1C>EQUAL 7.0%<8.0%: CPT | Mod: CPTII,S$GLB,, | Performed by: INTERNAL MEDICINE

## 2020-11-02 PROCEDURE — 3051F PR MOST RECENT HEMOGLOBIN A1C LEVEL 7.0 - < 8.0%: ICD-10-PCS | Mod: CPTII,S$GLB,, | Performed by: INTERNAL MEDICINE

## 2020-11-02 PROCEDURE — 3075F SYST BP GE 130 - 139MM HG: CPT | Mod: CPTII,S$GLB,, | Performed by: INTERNAL MEDICINE

## 2020-11-02 RX ORDER — GABAPENTIN 400 MG/1
400 CAPSULE ORAL 3 TIMES DAILY
Qty: 90 CAPSULE | Refills: 5 | Status: SHIPPED | OUTPATIENT
Start: 2020-11-02 | End: 2021-08-09 | Stop reason: SDUPTHER

## 2020-11-04 NOTE — PROGRESS NOTES
"Subjective:       Patient ID: Jl Araiza is a 50 y.o. male.    Chief Complaint: Follow-up    Last seen 3 months ago after a long absence, blood sugar was elevated, meds refilled. Had acute back pain with radicular symptoms, and knee pain after a fall at home - has been evaluated and managed by Ortho and Pain Management with improvement - feeling much better now. Returns for follow up showing glucose trends by graph on his phone - generally under 150, sometimes below 100 - monitoring regularly with his Freestyle Moose, compliant with Metformin as prescribed. Anxiety and depression are better controlled, back on Buspar and Wellbutrin. He reported consistently elevated blood pressure about a month ago - Losartan was increased from 50 to 100 mg daily, BP improving.      PMH:   Hypertension.   Diabetes type 2, HbA1c 7.5% July '20, Urine Microalbumin normal.  Hyperlipidemia.  July '20.  Ulcerative Colitis, last seen by GI 6/19.  Iron Def Anemia  Allergic Rhinitis  Depression/Anxiety, last seen by Psychiatry 12/17.  Obesity.   Eye exam 9/20. Podiatry 12/18. EGD 6/16. Colonoscopy 8/19. BMD normal 6/17. PSA 0.84 July '20.    PSH: Umbilical Hernia Repair.      Social: Non-smoker, rare alcohol.  with a son. , now working at [employer].     Hep B vaccine 2015, Prevnar 4/15. Pneumovax 11/15. Tdap 11/15. Flu shot 10/20.    NKDA.    Medications: list reviewed and reconciled.                   Review of Systems   Constitutional: Negative for fatigue and fever.   Respiratory: Negative for cough and shortness of breath.    Cardiovascular: Negative for chest pain and palpitations.   Gastrointestinal: Negative for abdominal pain, diarrhea, nausea and vomiting.   Neurological: Negative for dizziness and headaches.   Psychiatric/Behavioral: Negative for agitation and dysphoric mood.         Objective:    /80, Pulse 84, Temp 97.9, O2 Sat 99%, Ht 5' 9", Wt 270 lbs  Physical Exam  Constitutional:       " Appearance: He is not ill-appearing.   Cardiovascular:      Rate and Rhythm: Normal rate and regular rhythm.      Heart sounds: Normal heart sounds.   Pulmonary:      Effort: Pulmonary effort is normal. No respiratory distress.      Breath sounds: Normal breath sounds.   Musculoskeletal: Normal range of motion.         General: No swelling or deformity.      Comments: Protective Sensation:  Right: Intact  Left: Intact    Visual Inspection:  Normal -  Bilateral    Pedal Pulses:   Right: Present  Left: Present    Posterior tibialis:   Right:Present  Left: Present     Skin:     General: Skin is warm and dry.   Neurological:      Mental Status: He is alert.   Psychiatric:         Mood and Affect: Mood normal.         Behavior: Behavior normal.         Lab Visit on 10/26/2020   Component Date Value    Sodium 10/26/2020 137     Potassium 10/26/2020 4.4     Chloride 10/26/2020 103     CO2 10/26/2020 24     Glucose 10/26/2020 126*    BUN 10/26/2020 16     Creatinine 10/26/2020 1.1     Calcium 10/26/2020 9.4     Total Protein 10/26/2020 7.2     Albumin 10/26/2020 3.8     Total Bilirubin 10/26/2020 1.3*    Alkaline Phosphatase 10/26/2020 83     AST 10/26/2020 25     ALT 10/26/2020 44     Anion Gap 10/26/2020 10     eGFR if African American 10/26/2020 >60.0     eGFR if non African Amer* 10/26/2020 >60.0     Hemoglobin A1C 10/26/2020 7.1*    Estimated Avg Glucose 10/26/2020 157*      Assessment:       1. Type 2 diabetes mellitus without complication, without long-term current use of insulin    2. Essential hypertension    3. Hyperlipidemia, unspecified hyperlipidemia type    4. Lumbar disc disease with radiculopathy    5. Need for shingles vaccine        Plan:       Type 2 diabetes mellitus without complication, without long-term current use of insulin       -     Improved, not to goal - Metformin increased from 1,000 to 1,500mg daily.    Essential hypertension       -     Improved, continue Losartan 100mg.      Hyperlipidemia, unspecified hyperlipidemia type       -     Continue Pravastatin 20mg.    Lumbar disc disease with radiculopathy  -     gabapentin (NEURONTIN) 400 MG capsule; Take 1 capsule (400 mg total) by mouth 3 (three) times daily.  Dispense: 90 capsule; Refill: 5    Need for shingles vaccine - Shingrix today.

## 2020-11-18 ENCOUNTER — CLINICAL SUPPORT (OUTPATIENT)
Dept: REHABILITATION | Facility: OTHER | Age: 50
End: 2020-11-18
Payer: COMMERCIAL

## 2020-11-18 DIAGNOSIS — R53.1 DECREASED RANGE OF MOTION WITH DECREASED STRENGTH: ICD-10-CM

## 2020-11-18 DIAGNOSIS — Z74.09 IMPAIRED FUNCTIONAL MOBILITY, BALANCE, GAIT, AND ENDURANCE: ICD-10-CM

## 2020-11-18 DIAGNOSIS — M54.16 RIGHT LUMBAR RADICULITIS: ICD-10-CM

## 2020-11-18 DIAGNOSIS — M25.60 DECREASED RANGE OF MOTION WITH DECREASED STRENGTH: ICD-10-CM

## 2020-11-18 PROCEDURE — 97112 NEUROMUSCULAR REEDUCATION: CPT | Mod: PN

## 2020-11-18 NOTE — PROGRESS NOTES
"  Physical Therapy Treatment Note     Name: Jl Araiza  Clinic Number: [id number]    Therapy Diagnosis:   Encounter Diagnoses   Name Primary?    Right lumbar radiculitis     Impaired functional mobility, balance, gait, and endurance     Decreased range of motion with decreased strength      Physician: Brandon Michaels FNP    Visit Date: 11/18/2020    Physician Orders: PT Eval and Treat   Medical Diagnosis from Referral:   M51.16 (ICD-10-CM) - Lumbar disc disease with radiculopathy   M54.41 (ICD-10-CM) - Acute right-sided low back pain with right-sided sciatica   M51.36 (ICD-10-CM) - Annular tear of lumbar disc         Evaluation Date: 9/24/2020  Authorization Period Expiration: 12/31/2020  Plan of Care Expiration: 12/24/2020  Visit # / Visits authorized: 3/ 20     Time In: 7:00  Time Out: 7:45  Total Appointment Time (timed & untimed codes): 45 minutes     Precautions: Standard      Subjective     Pt reports: feeling improved ankle mobility but says that he has had R knee buckling 3x and 1 fall since Sunday. Notes that when he walks and the R knee starts to buckle, he compensates with overuse of the LLE and how has L hip pain.     He was compliant with home exercise program.  Response to previous treatment: good  Functional change: improved sit to stands with less WS to LLE    Pain: 4/10  Location: right low back and RLE - R great toe and dorsum of foot today    Objective     Jl received therapeutic exercises to develop strength, endurance, ROM, flexibility, posture and core stabilization for 00 minutes including:    **Exercises in BOLD performed today**    Seated great toe flex and ext stretch: 3 x 20" ea  Supine hip flexor stretch 3 x 30" --- resume next visit  Seated toe curls x 20  marble pick ups x 1  Self STM to quads/ITB with rolling pin x 2'  Golf ball roll out for plantar fascia x 2'  incline calf stretch 1 x 90"  seated ball roll out 3-way x 10 ea direction    Jl received the following manual " "therapy techniques:   00 min x joint mobs to 1st ray, MTP  00 min x Toure taping to R patella for medial glide, medial tilt. -- pt declined today    Jl participated in neuromuscular re-education activities to improve: Balance, Coordination, Kinesthetic, Sense, Proprioception and Posture for 45 minutes.    **Exercises in BOLD performed today**    Elliptical: 3 minutes - NP  Seated toe raises kimberlee 20x 3" holds - NP  Seated toe raises unilat x 20   Quad set 10 x 10"  SLR w/ QS 2 x 10  SL hip abd 2 x 10  bridge 2 x 10  LAQ (sciatic n. [place]) x 20 - with ball squeeze today  Sit to stands x 10 - 2" step under L foot  SLS with HHA 3 x 30" - able w/o HHA today  +trampoline march (stance on every 3rd): 2'  tandem stance (R leg posterior) w/o airex 2 x 30" - no HHA  standing 3-way hip x 10 ea direction x YTB at knees - kimberlee today   standing kmiberlee heel raises x 15   standing kimberlee toe raises x 15  +side stepping in // bars: 4 laps (down<>back = 1 lap) x YTB at knees  +mini squats in // bars: 1 x 10 x YTB at knees  +standing TKEs 20 x YTB - HHA on chair    lateral step up 1 x 5 x 2" step  TA activation 2 min x 5" holds  TA w/ kimberlee OH flex x 20  TA with BKFO x 10    Modalities: Jl received intermittent mechanical traction to the lumbar spine at a force of 85 pounds for a total of 00 minutes. Hold time of 40 seconds and rest time for 20 seconds. Patient tolerated treatment well without any adverse effects.    Consider dry needling and mechanical traction next   Home Exercises Provided and Patient Education Provided     Education provided:   - added self STM with golf ball to plantar fascia, rolling pin to R quads, ITB - 10/7/2020    Written Home Exercises Provided: Patient instructed to cont prior HEP.  Exercises were reviewed and Jl was able to demonstrate them prior to the end of the session.  Jl demonstrated good  understanding of the education provided.     See EMR under Patient Instructions for exercises provided " prior visit, 11/18/2020.    Assessment       Progressed lateral hip and quad strengthening today. Good tolerance but noted low repetitions due to decreased muscular endurance. Updated HEP to promote multidirectional hip strength and dynamic knee stability at home. Yellow TB given today.    Jl is progressing well towards his goals.   Pt prognosis is Good.     Pt will continue to benefit from skilled outpatient physical therapy to address the deficits listed in the problem list box on initial evaluation, provide pt/family education and to maximize pt's level of independence in the home and community environment.     Pt's spiritual, cultural and educational needs considered and pt agreeable to plan of care and goals.     Anticipated barriers to physical therapy: standard, recent discal injury     Goals:   Short Term Goals (6 Weeks):  1. Pt able to stand >=30 minutes with ADLs with mod difficulty.  2. Pt able to walk >=30 minutes with household chores with mod difficulty.  3.  Pt able to transfer in/out of chairs of various heights with mod difficulty.  5. Pt able to ascend/descend curb, independently, 1 handrail, with mod difficulty.  6. Pt to demonstrate improved functional ability with FOTO limitation <=36% disability.     Long Term Goals (12 Weeks):  1. Pt able to stand >=1 hour with with ADLs with min-no difficulty.  2. Pt able to walk >=1 hour with household chores with min-no difficulty.  3.  Pt able to squat/lift 5# from floor to waist with min-no difficulty.  4. Pt able to ascend/descend 1 flight of stairs, independently, 1 handrail, with min-no difficulty.  5. Pt will be independent with HEP and self management of symptoms.   6. Pt to demonstrate improved functional ability with FOTO limitation <=26% disability.    Plan     Continue with POC for myofascial mobility, ROM, flexibility, strength, and stability.  Plan of care Certification: 9/24/2020 to 12/24/2020. -- PN due by 10/24/2020    Sharon Partida,  PT

## 2020-12-14 ENCOUNTER — DOCUMENTATION ONLY (OUTPATIENT)
Dept: REHABILITATION | Facility: OTHER | Age: 50
End: 2020-12-14

## 2020-12-14 DIAGNOSIS — M25.60 DECREASED RANGE OF MOTION WITH DECREASED STRENGTH: ICD-10-CM

## 2020-12-14 DIAGNOSIS — Z74.09 IMPAIRED FUNCTIONAL MOBILITY, BALANCE, GAIT, AND ENDURANCE: ICD-10-CM

## 2020-12-14 DIAGNOSIS — M54.16 RIGHT LUMBAR RADICULITIS: Primary | ICD-10-CM

## 2020-12-14 DIAGNOSIS — R53.1 DECREASED RANGE OF MOTION WITH DECREASED STRENGTH: ICD-10-CM

## 2020-12-14 NOTE — PROGRESS NOTES
Outpatient Therapy Discharge Summary     Name: Jl Araiza  [facility] Number: [id number]    Therapy Diagnosis:   Encounter Diagnoses   Name Primary?    Right lumbar radiculitis Yes    Impaired functional mobility, balance, gait, and endurance     Decreased range of motion with decreased strength      Physician: No ref. provider found    Physician Orders: PT Eval and Treat   Medical Diagnosis from Referral:   M51.16 (ICD-10-CM) - Lumbar disc disease with radiculopathy   M54.41 (ICD-10-CM) - Acute right-sided low back pain with right-sided sciatica   M51.36 (ICD-10-CM) - Annular tear of lumbar disc         Evaluation Date: 9/24/2020        Date of Last visit: 11/18/2020  Total Visits Received: 5  Cancelled Visits: 1  No Show Visits: 1    Assessment    Goals: 0/12 goals met    Discharge reason: Patient has not attended therapy since 11/18/2020    Plan   This patient is discharged from Physical Therapy

## 2020-12-22 DIAGNOSIS — K51.90 ULCERATIVE COLITIS WITHOUT COMPLICATIONS, UNSPECIFIED LOCATION: ICD-10-CM

## 2020-12-22 RX ORDER — MESALAMINE 0.38 G/1
1.5 CAPSULE, EXTENDED RELEASE ORAL DAILY
Qty: 120 CAPSULE | Refills: 2 | Status: SHIPPED | OUTPATIENT
Start: 2020-12-22 | End: 2021-03-31

## 2021-02-01 DIAGNOSIS — F33.1 MODERATE EPISODE OF RECURRENT MAJOR DEPRESSIVE DISORDER: ICD-10-CM

## 2021-02-01 DIAGNOSIS — F41.1 GAD (GENERALIZED ANXIETY DISORDER): ICD-10-CM

## 2021-02-02 RX ORDER — BUSPIRONE HYDROCHLORIDE 15 MG/1
15 TABLET ORAL 2 TIMES DAILY
Qty: 60 TABLET | Refills: 5 | Status: SHIPPED | OUTPATIENT
Start: 2021-02-02 | End: 2021-08-06

## 2021-02-02 RX ORDER — BUPROPION HYDROCHLORIDE 150 MG/1
150 TABLET ORAL DAILY
Qty: 30 TABLET | Refills: 5 | Status: SHIPPED | OUTPATIENT
Start: 2021-02-02 | End: 2021-08-06

## 2021-03-13 ENCOUNTER — IMMUNIZATION (OUTPATIENT)
Dept: PRIMARY CARE CLINIC | Facility: CLINIC | Age: 51
End: 2021-03-13
Payer: COMMERCIAL

## 2021-03-13 DIAGNOSIS — Z23 NEED FOR VACCINATION: Primary | ICD-10-CM

## 2021-03-13 PROCEDURE — 91300 PR SARS-COV- 2 COVID-19 VACCINE, NO PRSV, 30MCG/0.3ML, IM: ICD-10-PCS | Mod: S$GLB,,, | Performed by: INTERNAL MEDICINE

## 2021-03-13 PROCEDURE — 91300 PR SARS-COV- 2 COVID-19 VACCINE, NO PRSV, 30MCG/0.3ML, IM: CPT | Mod: S$GLB,,, | Performed by: INTERNAL MEDICINE

## 2021-03-13 PROCEDURE — 0001A PR IMMUNIZ ADMIN, SARS-COV-2 COVID-19 VACC, 30MCG/0.3ML, 1ST DOSE: ICD-10-PCS | Mod: CV19,S$GLB,, | Performed by: INTERNAL MEDICINE

## 2021-03-13 PROCEDURE — 0001A PR IMMUNIZ ADMIN, SARS-COV-2 COVID-19 VACC, 30MCG/0.3ML, 1ST DOSE: CPT | Mod: CV19,S$GLB,, | Performed by: INTERNAL MEDICINE

## 2021-03-13 RX ADMIN — Medication 0.3 ML: at 10:03

## 2021-03-30 DIAGNOSIS — K51.90 ULCERATIVE COLITIS WITHOUT COMPLICATIONS, UNSPECIFIED LOCATION: ICD-10-CM

## 2021-03-31 RX ORDER — MESALAMINE 0.38 G/1
CAPSULE, EXTENDED RELEASE ORAL
Qty: 120 CAPSULE | Refills: 0 | Status: SHIPPED | OUTPATIENT
Start: 2021-03-31 | End: 2021-04-29

## 2021-04-03 ENCOUNTER — IMMUNIZATION (OUTPATIENT)
Dept: PRIMARY CARE CLINIC | Facility: CLINIC | Age: 51
End: 2021-04-03
Payer: COMMERCIAL

## 2021-04-03 DIAGNOSIS — Z23 NEED FOR VACCINATION: Primary | ICD-10-CM

## 2021-04-03 PROCEDURE — 0002A PR IMMUNIZ ADMIN, SARS-COV-2 COVID-19 VACC, 30MCG/0.3ML, 2ND DOSE: ICD-10-PCS | Mod: CV19,S$GLB,, | Performed by: INTERNAL MEDICINE

## 2021-04-03 PROCEDURE — 91300 PR SARS-COV- 2 COVID-19 VACCINE, NO PRSV, 30MCG/0.3ML, IM: CPT | Mod: S$GLB,,, | Performed by: INTERNAL MEDICINE

## 2021-04-03 PROCEDURE — 0002A PR IMMUNIZ ADMIN, SARS-COV-2 COVID-19 VACC, 30MCG/0.3ML, 2ND DOSE: CPT | Mod: CV19,S$GLB,, | Performed by: INTERNAL MEDICINE

## 2021-04-03 PROCEDURE — 91300 PR SARS-COV- 2 COVID-19 VACCINE, NO PRSV, 30MCG/0.3ML, IM: ICD-10-PCS | Mod: S$GLB,,, | Performed by: INTERNAL MEDICINE

## 2021-04-03 RX ADMIN — Medication 0.3 ML: at 10:04

## 2021-05-27 ENCOUNTER — PATIENT MESSAGE (OUTPATIENT)
Dept: PRIMARY CARE CLINIC | Facility: CLINIC | Age: 51
End: 2021-05-27

## 2021-05-27 ENCOUNTER — TELEPHONE (OUTPATIENT)
Dept: PRIMARY CARE CLINIC | Facility: CLINIC | Age: 51
End: 2021-05-27

## 2021-05-27 DIAGNOSIS — K51.90 ULCERATIVE COLITIS WITHOUT COMPLICATIONS, UNSPECIFIED LOCATION: ICD-10-CM

## 2021-05-27 DIAGNOSIS — Z12.5 PROSTATE CANCER SCREENING: ICD-10-CM

## 2021-05-27 DIAGNOSIS — Z00.00 ROUTINE MEDICAL EXAM: Primary | ICD-10-CM

## 2021-05-27 DIAGNOSIS — E11.9 TYPE 2 DIABETES MELLITUS WITHOUT COMPLICATION, WITHOUT LONG-TERM CURRENT USE OF INSULIN: ICD-10-CM

## 2021-05-28 ENCOUNTER — PATIENT MESSAGE (OUTPATIENT)
Dept: PRIMARY CARE CLINIC | Facility: CLINIC | Age: 51
End: 2021-05-28

## 2021-05-29 DIAGNOSIS — K51.90 ULCERATIVE COLITIS WITHOUT COMPLICATIONS, UNSPECIFIED LOCATION: ICD-10-CM

## 2021-05-31 ENCOUNTER — TELEPHONE (OUTPATIENT)
Dept: PRIMARY CARE CLINIC | Facility: CLINIC | Age: 51
End: 2021-05-31

## 2021-05-31 ENCOUNTER — TELEPHONE (OUTPATIENT)
Dept: GASTROENTEROLOGY | Facility: CLINIC | Age: 51
End: 2021-05-31

## 2021-05-31 DIAGNOSIS — K51.90 ULCERATIVE COLITIS WITHOUT COMPLICATIONS, UNSPECIFIED LOCATION: ICD-10-CM

## 2021-05-31 RX ORDER — MESALAMINE 0.38 G/1
1.5 CAPSULE, EXTENDED RELEASE ORAL DAILY
Qty: 120 CAPSULE | Refills: 2 | Status: SHIPPED | OUTPATIENT
Start: 2021-05-31 | End: 2021-09-13 | Stop reason: SDUPTHER

## 2021-05-31 RX ORDER — MESALAMINE 0.38 G/1
CAPSULE, EXTENDED RELEASE ORAL
Qty: 120 CAPSULE | Refills: 0 | OUTPATIENT
Start: 2021-05-31

## 2021-07-07 ENCOUNTER — PATIENT MESSAGE (OUTPATIENT)
Dept: ADMINISTRATIVE | Facility: HOSPITAL | Age: 51
End: 2021-07-07

## 2021-07-24 ENCOUNTER — LAB VISIT (OUTPATIENT)
Dept: LAB | Facility: HOSPITAL | Age: 51
End: 2021-07-24
Attending: INTERNAL MEDICINE
Payer: COMMERCIAL

## 2021-07-24 DIAGNOSIS — Z12.5 PROSTATE CANCER SCREENING: ICD-10-CM

## 2021-07-24 DIAGNOSIS — Z00.00 ROUTINE MEDICAL EXAM: ICD-10-CM

## 2021-07-24 DIAGNOSIS — E11.9 TYPE 2 DIABETES MELLITUS WITHOUT COMPLICATION, WITHOUT LONG-TERM CURRENT USE OF INSULIN: ICD-10-CM

## 2021-07-24 DIAGNOSIS — K51.90 ULCERATIVE COLITIS WITHOUT COMPLICATIONS, UNSPECIFIED LOCATION: ICD-10-CM

## 2021-07-24 LAB
ALBUMIN SERPL BCP-MCNC: 3.8 G/DL (ref 3.5–5.2)
ALP SERPL-CCNC: 87 U/L (ref 55–135)
ALT SERPL W/O P-5'-P-CCNC: 53 U/L (ref 10–44)
ANION GAP SERPL CALC-SCNC: 10 MMOL/L (ref 8–16)
AST SERPL-CCNC: 28 U/L (ref 10–40)
BILIRUB SERPL-MCNC: 0.9 MG/DL (ref 0.1–1)
BUN SERPL-MCNC: 18 MG/DL (ref 6–20)
CALCIUM SERPL-MCNC: 10 MG/DL (ref 8.7–10.5)
CHLORIDE SERPL-SCNC: 106 MMOL/L (ref 95–110)
CHOLEST SERPL-MCNC: 192 MG/DL (ref 120–199)
CHOLEST/HDLC SERPL: 4.4 {RATIO} (ref 2–5)
CO2 SERPL-SCNC: 21 MMOL/L (ref 23–29)
COMPLEXED PSA SERPL-MCNC: 1.2 NG/ML (ref 0–4)
CREAT SERPL-MCNC: 1 MG/DL (ref 0.5–1.4)
CRP SERPL-MCNC: 8.5 MG/L (ref 0–8.2)
ERYTHROCYTE [DISTWIDTH] IN BLOOD BY AUTOMATED COUNT: 12.8 % (ref 11.5–14.5)
EST. GFR  (AFRICAN AMERICAN): >60 ML/MIN/1.73 M^2
EST. GFR  (NON AFRICAN AMERICAN): >60 ML/MIN/1.73 M^2
ESTIMATED AVG GLUCOSE: 335 MG/DL (ref 68–131)
GLUCOSE SERPL-MCNC: 137 MG/DL (ref 70–110)
HBA1C MFR BLD: 13.3 % (ref 4–5.6)
HCT VFR BLD AUTO: 46.1 % (ref 40–54)
HDLC SERPL-MCNC: 44 MG/DL (ref 40–75)
HDLC SERPL: 22.9 % (ref 20–50)
HGB BLD-MCNC: 15.4 G/DL (ref 14–18)
LDLC SERPL CALC-MCNC: 126.8 MG/DL (ref 63–159)
MCH RBC QN AUTO: 28.2 PG (ref 27–31)
MCHC RBC AUTO-ENTMCNC: 33.4 G/DL (ref 32–36)
MCV RBC AUTO: 84 FL (ref 82–98)
NONHDLC SERPL-MCNC: 148 MG/DL
PLATELET # BLD AUTO: 323 K/UL (ref 150–450)
PMV BLD AUTO: 8.9 FL (ref 9.2–12.9)
POTASSIUM SERPL-SCNC: 3.9 MMOL/L (ref 3.5–5.1)
PROT SERPL-MCNC: 7.5 G/DL (ref 6–8.4)
RBC # BLD AUTO: 5.46 M/UL (ref 4.6–6.2)
SODIUM SERPL-SCNC: 137 MMOL/L (ref 136–145)
TRIGL SERPL-MCNC: 106 MG/DL (ref 30–150)
WBC # BLD AUTO: 7.8 K/UL (ref 3.9–12.7)

## 2021-07-24 PROCEDURE — 84153 ASSAY OF PSA TOTAL: CPT | Performed by: INTERNAL MEDICINE

## 2021-07-24 PROCEDURE — 86140 C-REACTIVE PROTEIN: CPT | Performed by: INTERNAL MEDICINE

## 2021-07-24 PROCEDURE — 80053 COMPREHEN METABOLIC PANEL: CPT | Performed by: INTERNAL MEDICINE

## 2021-07-24 PROCEDURE — 80061 LIPID PANEL: CPT | Performed by: INTERNAL MEDICINE

## 2021-07-24 PROCEDURE — 36415 COLL VENOUS BLD VENIPUNCTURE: CPT | Performed by: INTERNAL MEDICINE

## 2021-07-24 PROCEDURE — 83036 HEMOGLOBIN GLYCOSYLATED A1C: CPT | Performed by: INTERNAL MEDICINE

## 2021-07-24 PROCEDURE — 85027 COMPLETE CBC AUTOMATED: CPT | Performed by: INTERNAL MEDICINE

## 2021-07-26 ENCOUNTER — PATIENT MESSAGE (OUTPATIENT)
Dept: PRIMARY CARE CLINIC | Facility: CLINIC | Age: 51
End: 2021-07-26

## 2021-08-09 ENCOUNTER — OFFICE VISIT (OUTPATIENT)
Dept: PRIMARY CARE CLINIC | Facility: CLINIC | Age: 51
End: 2021-08-09
Payer: COMMERCIAL

## 2021-08-09 VITALS
BODY MASS INDEX: 35.98 KG/M2 | DIASTOLIC BLOOD PRESSURE: 96 MMHG | WEIGHT: 242.94 LBS | HEIGHT: 69 IN | OXYGEN SATURATION: 98 % | SYSTOLIC BLOOD PRESSURE: 140 MMHG | TEMPERATURE: 98 F | HEART RATE: 81 BPM

## 2021-08-09 DIAGNOSIS — Z11.4 SCREENING FOR HIV (HUMAN IMMUNODEFICIENCY VIRUS): ICD-10-CM

## 2021-08-09 DIAGNOSIS — M51.16 LUMBAR DISC DISEASE WITH RADICULOPATHY: ICD-10-CM

## 2021-08-09 DIAGNOSIS — G89.29 CHRONIC PAIN OF RIGHT KNEE: ICD-10-CM

## 2021-08-09 DIAGNOSIS — G57.91 NEUROPATHY OF RIGHT FOOT: ICD-10-CM

## 2021-08-09 DIAGNOSIS — I10 ESSENTIAL HYPERTENSION: ICD-10-CM

## 2021-08-09 DIAGNOSIS — M25.561 CHRONIC PAIN OF RIGHT KNEE: ICD-10-CM

## 2021-08-09 DIAGNOSIS — K51.90 ULCERATIVE COLITIS WITHOUT COMPLICATIONS, UNSPECIFIED LOCATION: ICD-10-CM

## 2021-08-09 DIAGNOSIS — E66.01 SEVERE OBESITY (BMI 35.0-39.9) WITH COMORBIDITY: ICD-10-CM

## 2021-08-09 DIAGNOSIS — E78.5 HYPERLIPIDEMIA, UNSPECIFIED HYPERLIPIDEMIA TYPE: ICD-10-CM

## 2021-08-09 DIAGNOSIS — Z00.00 ROUTINE MEDICAL EXAM: Primary | ICD-10-CM

## 2021-08-09 LAB
ALBUMIN/CREAT UR: 5.4 UG/MG (ref 0–30)
CREAT UR-MCNC: 93 MG/DL (ref 23–375)
MICROALBUMIN UR DL<=1MG/L-MCNC: 5 UG/ML

## 2021-08-09 PROCEDURE — 99396 PREV VISIT EST AGE 40-64: CPT | Mod: S$GLB,,, | Performed by: INTERNAL MEDICINE

## 2021-08-09 PROCEDURE — 3077F SYST BP >= 140 MM HG: CPT | Mod: CPTII,S$GLB,, | Performed by: INTERNAL MEDICINE

## 2021-08-09 PROCEDURE — 3077F PR MOST RECENT SYSTOLIC BLOOD PRESSURE >= 140 MM HG: ICD-10-PCS | Mod: CPTII,S$GLB,, | Performed by: INTERNAL MEDICINE

## 2021-08-09 PROCEDURE — 3046F PR MOST RECENT HEMOGLOBIN A1C LEVEL > 9.0%: ICD-10-PCS | Mod: CPTII,S$GLB,, | Performed by: INTERNAL MEDICINE

## 2021-08-09 PROCEDURE — 3080F PR MOST RECENT DIASTOLIC BLOOD PRESSURE >= 90 MM HG: ICD-10-PCS | Mod: CPTII,S$GLB,, | Performed by: INTERNAL MEDICINE

## 2021-08-09 PROCEDURE — 3080F DIAST BP >= 90 MM HG: CPT | Mod: CPTII,S$GLB,, | Performed by: INTERNAL MEDICINE

## 2021-08-09 PROCEDURE — 3046F HEMOGLOBIN A1C LEVEL >9.0%: CPT | Mod: CPTII,S$GLB,, | Performed by: INTERNAL MEDICINE

## 2021-08-09 PROCEDURE — 82570 ASSAY OF URINE CREATININE: CPT | Performed by: INTERNAL MEDICINE

## 2021-08-09 PROCEDURE — 1159F MED LIST DOCD IN RCRD: CPT | Mod: CPTII,S$GLB,, | Performed by: INTERNAL MEDICINE

## 2021-08-09 PROCEDURE — 1125F AMNT PAIN NOTED PAIN PRSNT: CPT | Mod: CPTII,S$GLB,, | Performed by: INTERNAL MEDICINE

## 2021-08-09 PROCEDURE — 82043 UR ALBUMIN QUANTITATIVE: CPT | Performed by: INTERNAL MEDICINE

## 2021-08-09 PROCEDURE — 99999 PR PBB SHADOW E&M-EST. PATIENT-LVL V: ICD-10-PCS | Mod: PBBFAC,,, | Performed by: INTERNAL MEDICINE

## 2021-08-09 PROCEDURE — 1160F RVW MEDS BY RX/DR IN RCRD: CPT | Mod: CPTII,S$GLB,, | Performed by: INTERNAL MEDICINE

## 2021-08-09 PROCEDURE — 1125F PR PAIN SEVERITY QUANTIFIED, PAIN PRESENT: ICD-10-PCS | Mod: CPTII,S$GLB,, | Performed by: INTERNAL MEDICINE

## 2021-08-09 PROCEDURE — 99396 PR PREVENTIVE VISIT,EST,40-64: ICD-10-PCS | Mod: S$GLB,,, | Performed by: INTERNAL MEDICINE

## 2021-08-09 PROCEDURE — 1159F PR MEDICATION LIST DOCUMENTED IN MEDICAL RECORD: ICD-10-PCS | Mod: CPTII,S$GLB,, | Performed by: INTERNAL MEDICINE

## 2021-08-09 PROCEDURE — 3008F BODY MASS INDEX DOCD: CPT | Mod: CPTII,S$GLB,, | Performed by: INTERNAL MEDICINE

## 2021-08-09 PROCEDURE — 1160F PR REVIEW ALL MEDS BY PRESCRIBER/CLIN PHARMACIST DOCUMENTED: ICD-10-PCS | Mod: CPTII,S$GLB,, | Performed by: INTERNAL MEDICINE

## 2021-08-09 PROCEDURE — 3072F PR LOW RISK FOR RETINOPATHY: ICD-10-PCS | Mod: CPTII,S$GLB,, | Performed by: INTERNAL MEDICINE

## 2021-08-09 PROCEDURE — 99999 PR PBB SHADOW E&M-EST. PATIENT-LVL V: CPT | Mod: PBBFAC,,, | Performed by: INTERNAL MEDICINE

## 2021-08-09 PROCEDURE — 3072F LOW RISK FOR RETINOPATHY: CPT | Mod: CPTII,S$GLB,, | Performed by: INTERNAL MEDICINE

## 2021-08-09 PROCEDURE — 3008F PR BODY MASS INDEX (BMI) DOCUMENTED: ICD-10-PCS | Mod: CPTII,S$GLB,, | Performed by: INTERNAL MEDICINE

## 2021-08-09 RX ORDER — GABAPENTIN 400 MG/1
400 CAPSULE ORAL 2 TIMES DAILY
Qty: 60 CAPSULE | Refills: 5 | Status: SHIPPED | OUTPATIENT
Start: 2021-08-09 | End: 2022-02-21 | Stop reason: SDUPTHER

## 2021-08-09 RX ORDER — PRAVASTATIN SODIUM 40 MG/1
40 TABLET ORAL DAILY
Qty: 90 TABLET | Refills: 1 | Status: SHIPPED | OUTPATIENT
Start: 2021-08-09 | End: 2022-02-13

## 2021-08-09 RX ORDER — METFORMIN HYDROCHLORIDE 500 MG/1
1000 TABLET, EXTENDED RELEASE ORAL 2 TIMES DAILY WITH MEALS
Qty: 120 TABLET | Refills: 5 | Status: SHIPPED | OUTPATIENT
Start: 2021-08-09 | End: 2022-02-21 | Stop reason: SDUPTHER

## 2021-08-09 RX ORDER — LOSARTAN POTASSIUM 100 MG/1
100 TABLET ORAL DAILY
Qty: 90 TABLET | Refills: 1 | Status: SHIPPED | OUTPATIENT
Start: 2021-08-09 | End: 2021-10-13

## 2021-10-05 ENCOUNTER — PATIENT MESSAGE (OUTPATIENT)
Dept: ADMINISTRATIVE | Facility: HOSPITAL | Age: 51
End: 2021-10-05

## 2021-10-26 DIAGNOSIS — E78.5 HYPERLIPIDEMIA, UNSPECIFIED HYPERLIPIDEMIA TYPE: ICD-10-CM

## 2021-10-26 RX ORDER — PRAVASTATIN SODIUM 20 MG/1
TABLET ORAL
Qty: 30 TABLET | Refills: 0 | Status: SHIPPED | OUTPATIENT
Start: 2021-10-26 | End: 2022-02-13 | Stop reason: CLARIF

## 2021-10-28 ENCOUNTER — PATIENT OUTREACH (OUTPATIENT)
Dept: ADMINISTRATIVE | Facility: HOSPITAL | Age: 51
End: 2021-10-28
Payer: COMMERCIAL

## 2021-10-28 ENCOUNTER — PATIENT MESSAGE (OUTPATIENT)
Dept: ADMINISTRATIVE | Facility: HOSPITAL | Age: 51
End: 2021-10-28
Payer: COMMERCIAL

## 2021-11-25 DIAGNOSIS — E78.5 HYPERLIPIDEMIA, UNSPECIFIED HYPERLIPIDEMIA TYPE: ICD-10-CM

## 2021-11-26 RX ORDER — PRAVASTATIN SODIUM 20 MG/1
TABLET ORAL
Qty: 30 TABLET | Refills: 0 | OUTPATIENT
Start: 2021-11-26

## 2021-12-23 ENCOUNTER — TELEPHONE (OUTPATIENT)
Dept: GASTROENTEROLOGY | Facility: CLINIC | Age: 51
End: 2021-12-23
Payer: COMMERCIAL

## 2021-12-23 DIAGNOSIS — Z79.899 ENCOUNTER FOR LONG-TERM (CURRENT) USE OF HIGH-RISK MEDICATION: ICD-10-CM

## 2021-12-23 DIAGNOSIS — K52.9 IBD (INFLAMMATORY BOWEL DISEASE): Primary | ICD-10-CM

## 2021-12-23 DIAGNOSIS — K22.10 EROSIVE ESOPHAGITIS: ICD-10-CM

## 2022-01-19 ENCOUNTER — PATIENT MESSAGE (OUTPATIENT)
Dept: ADMINISTRATIVE | Facility: HOSPITAL | Age: 52
End: 2022-01-19
Payer: COMMERCIAL

## 2022-02-16 ENCOUNTER — PATIENT MESSAGE (OUTPATIENT)
Dept: PSYCHIATRY | Facility: CLINIC | Age: 52
End: 2022-02-16
Payer: COMMERCIAL

## 2022-02-17 ENCOUNTER — PATIENT MESSAGE (OUTPATIENT)
Dept: PRIMARY CARE CLINIC | Facility: CLINIC | Age: 52
End: 2022-02-17
Payer: COMMERCIAL

## 2022-02-17 DIAGNOSIS — M51.16 LUMBAR DISC DISEASE WITH RADICULOPATHY: ICD-10-CM

## 2022-02-17 DIAGNOSIS — F33.1 MODERATE EPISODE OF RECURRENT MAJOR DEPRESSIVE DISORDER: ICD-10-CM

## 2022-02-17 DIAGNOSIS — F41.1 GAD (GENERALIZED ANXIETY DISORDER): ICD-10-CM

## 2022-02-17 NOTE — TELEPHONE ENCOUNTER
Has overdue lab orders. Last HbA1c was 13 July '21, next set of labs were due in October - orders on chart, schedule asap.

## 2022-02-17 NOTE — TELEPHONE ENCOUNTER
No new care gaps identified.  Powered by [facility] [employer]. Reference number: [id number].   2/17/2022 3:21:01 AM CST

## 2022-02-21 ENCOUNTER — PATIENT MESSAGE (OUTPATIENT)
Dept: ADMINISTRATIVE | Facility: HOSPITAL | Age: 52
End: 2022-02-21
Payer: COMMERCIAL

## 2022-02-21 DIAGNOSIS — E11.9 TYPE 2 DIABETES MELLITUS WITHOUT COMPLICATION, UNSPECIFIED WHETHER LONG TERM INSULIN USE: ICD-10-CM

## 2022-02-21 RX ORDER — METFORMIN HYDROCHLORIDE 500 MG/1
1000 TABLET, EXTENDED RELEASE ORAL 2 TIMES DAILY WITH MEALS
Qty: 120 TABLET | Refills: 0 | Status: SHIPPED | OUTPATIENT
Start: 2022-02-21 | End: 2022-03-23 | Stop reason: SDUPTHER

## 2022-02-21 RX ORDER — GABAPENTIN 400 MG/1
CAPSULE ORAL
Qty: 60 CAPSULE | Refills: 5 | OUTPATIENT
Start: 2022-02-21

## 2022-02-21 RX ORDER — BUSPIRONE HYDROCHLORIDE 15 MG/1
TABLET ORAL
Qty: 60 TABLET | Refills: 0 | Status: SHIPPED | OUTPATIENT
Start: 2022-02-21 | End: 2022-03-22

## 2022-02-21 RX ORDER — BUSPIRONE HYDROCHLORIDE 15 MG/1
TABLET ORAL
Qty: 60 TABLET | Refills: 5 | OUTPATIENT
Start: 2022-02-21

## 2022-02-21 RX ORDER — BUPROPION HYDROCHLORIDE 150 MG/1
TABLET ORAL
Qty: 30 TABLET | Refills: 5 | OUTPATIENT
Start: 2022-02-21

## 2022-02-21 RX ORDER — GABAPENTIN 400 MG/1
400 CAPSULE ORAL 2 TIMES DAILY
Qty: 60 CAPSULE | Refills: 0 | Status: SHIPPED | OUTPATIENT
Start: 2022-02-21 | End: 2022-03-22

## 2022-02-21 RX ORDER — BUPROPION HYDROCHLORIDE 150 MG/1
150 TABLET ORAL DAILY
Qty: 30 TABLET | Refills: 0 | Status: SHIPPED | OUTPATIENT
Start: 2022-02-21 | End: 2022-03-22

## 2022-03-15 DIAGNOSIS — F33.1 MODERATE EPISODE OF RECURRENT MAJOR DEPRESSIVE DISORDER: ICD-10-CM

## 2022-03-15 DIAGNOSIS — F41.1 GAD (GENERALIZED ANXIETY DISORDER): ICD-10-CM

## 2022-03-15 DIAGNOSIS — K51.90 ULCERATIVE COLITIS WITHOUT COMPLICATIONS, UNSPECIFIED LOCATION: ICD-10-CM

## 2022-03-15 DIAGNOSIS — M51.16 LUMBAR DISC DISEASE WITH RADICULOPATHY: ICD-10-CM

## 2022-03-15 NOTE — TELEPHONE ENCOUNTER
No new care gaps identified.  Powered by [employer]. Reference number: [id number].   3/15/2022 5:36:45 PM CDT

## 2022-03-16 ENCOUNTER — TELEPHONE (OUTPATIENT)
Dept: PRIMARY CARE CLINIC | Facility: CLINIC | Age: 52
End: 2022-03-16
Payer: COMMERCIAL

## 2022-03-16 ENCOUNTER — PATIENT MESSAGE (OUTPATIENT)
Dept: INTERNAL MEDICINE | Facility: CLINIC | Age: 52
End: 2022-03-16
Payer: COMMERCIAL

## 2022-03-16 RX ORDER — MESALAMINE 375 MG/1
CAPSULE, EXTENDED RELEASE ORAL
Qty: 120 CAPSULE | Refills: 0 | Status: SHIPPED | OUTPATIENT
Start: 2022-03-16 | End: 2022-03-17

## 2022-03-16 NOTE — TELEPHONE ENCOUNTER
Contact patient per Dr. Marie, please tell Jl he is due for his IBD labs please schedule the labs I placed the labs orders old December 23, 2021.     Please schedule patient for follow-up GI clinic     Please remind him he is due for his EGD colonoscopy for surveillance orders have been placed.     Endoscopy schedulers please schedule patient for his EGD colonoscopy thank you     Case Request Endoscopy: EGD (ESOPHAGOGASTRODUODENOSCOPY), COLONOSCOPY [GI5] (Order [id number])   Case Request   Date and Time: 12/23/2021  2:29 PM Department: [facility] Gastroenterology Rel By/Authorizing: Edmund Marie MD     No answer left message for patient to call the office back.   Sent a message to patient in the patient portal.

## 2022-03-16 NOTE — TELEPHONE ENCOUNTER
Please schedule my labs the same day as labs scheduled for Dr. Marie on 3/21/22, the three orders I issued on 8/9/21. Inform him that fasting is required. And schedule a virtual visit to discuss results.

## 2022-03-21 ENCOUNTER — LAB VISIT (OUTPATIENT)
Dept: LAB | Facility: HOSPITAL | Age: 52
End: 2022-03-21
Attending: INTERNAL MEDICINE
Payer: COMMERCIAL

## 2022-03-21 DIAGNOSIS — Z11.4 SCREENING FOR HIV (HUMAN IMMUNODEFICIENCY VIRUS): ICD-10-CM

## 2022-03-21 DIAGNOSIS — Z79.899 ENCOUNTER FOR LONG-TERM (CURRENT) USE OF HIGH-RISK MEDICATION: ICD-10-CM

## 2022-03-21 DIAGNOSIS — K52.9 IBD (INFLAMMATORY BOWEL DISEASE): ICD-10-CM

## 2022-03-21 DIAGNOSIS — E78.5 HYPERLIPIDEMIA, UNSPECIFIED HYPERLIPIDEMIA TYPE: ICD-10-CM

## 2022-03-21 DIAGNOSIS — K22.10 EROSIVE ESOPHAGITIS: ICD-10-CM

## 2022-03-21 LAB
ALBUMIN SERPL BCP-MCNC: 3.9 G/DL (ref 3.5–5.2)
ALP SERPL-CCNC: 79 U/L (ref 55–135)
ALT SERPL W/O P-5'-P-CCNC: 65 U/L (ref 10–44)
ANION GAP SERPL CALC-SCNC: 11 MMOL/L (ref 8–16)
AST SERPL-CCNC: 28 U/L (ref 10–40)
BASOPHILS # BLD AUTO: 0.06 K/UL (ref 0–0.2)
BASOPHILS NFR BLD: 0.8 % (ref 0–1.9)
BILIRUB SERPL-MCNC: 1.1 MG/DL (ref 0.1–1)
BUN SERPL-MCNC: 17 MG/DL (ref 6–20)
CALCIUM SERPL-MCNC: 9.9 MG/DL (ref 8.7–10.5)
CHLORIDE SERPL-SCNC: 102 MMOL/L (ref 95–110)
CHOLEST SERPL-MCNC: 185 MG/DL (ref 120–199)
CHOLEST/HDLC SERPL: 4.6 {RATIO} (ref 2–5)
CO2 SERPL-SCNC: 25 MMOL/L (ref 23–29)
CREAT SERPL-MCNC: 1.1 MG/DL (ref 0.5–1.4)
DIFFERENTIAL METHOD: ABNORMAL
EOSINOPHIL # BLD AUTO: 0.3 K/UL (ref 0–0.5)
EOSINOPHIL NFR BLD: 4.3 % (ref 0–8)
ERYTHROCYTE [DISTWIDTH] IN BLOOD BY AUTOMATED COUNT: 13.3 % (ref 11.5–14.5)
EST. GFR  (AFRICAN AMERICAN): >60 ML/MIN/1.73 M^2
EST. GFR  (NON AFRICAN AMERICAN): >60 ML/MIN/1.73 M^2
ESTIMATED AVG GLUCOSE: 174 MG/DL (ref 68–131)
GLUCOSE SERPL-MCNC: 184 MG/DL (ref 70–110)
HBA1C MFR BLD: 7.7 % (ref 4–5.6)
HCT VFR BLD AUTO: 45.7 % (ref 40–54)
HDLC SERPL-MCNC: 40 MG/DL (ref 40–75)
HDLC SERPL: 21.6 % (ref 20–50)
HGB BLD-MCNC: 14.9 G/DL (ref 14–18)
IMM GRANULOCYTES # BLD AUTO: 0.04 K/UL (ref 0–0.04)
IMM GRANULOCYTES NFR BLD AUTO: 0.5 % (ref 0–0.5)
LDLC SERPL CALC-MCNC: 106.6 MG/DL (ref 63–159)
LYMPHOCYTES # BLD AUTO: 2.4 K/UL (ref 1–4.8)
LYMPHOCYTES NFR BLD: 30.6 % (ref 18–48)
MAGNESIUM SERPL-MCNC: 1.8 MG/DL (ref 1.6–2.6)
MCH RBC QN AUTO: 28.6 PG (ref 27–31)
MCHC RBC AUTO-ENTMCNC: 32.6 G/DL (ref 32–36)
MCV RBC AUTO: 88 FL (ref 82–98)
MONOCYTES # BLD AUTO: 0.8 K/UL (ref 0.3–1)
MONOCYTES NFR BLD: 10.6 % (ref 4–15)
NEUTROPHILS # BLD AUTO: 4.2 K/UL (ref 1.8–7.7)
NEUTROPHILS NFR BLD: 53.2 % (ref 38–73)
NONHDLC SERPL-MCNC: 145 MG/DL
NRBC BLD-RTO: 0 /100 WBC
PLATELET # BLD AUTO: 302 K/UL (ref 150–450)
PMV BLD AUTO: 8.9 FL (ref 9.2–12.9)
POTASSIUM SERPL-SCNC: 4.2 MMOL/L (ref 3.5–5.1)
PROT SERPL-MCNC: 7.3 G/DL (ref 6–8.4)
RBC # BLD AUTO: 5.21 M/UL (ref 4.6–6.2)
SODIUM SERPL-SCNC: 138 MMOL/L (ref 136–145)
TRIGL SERPL-MCNC: 192 MG/DL (ref 30–150)
VIT B12 SERPL-MCNC: 602 PG/ML (ref 210–950)
WBC # BLD AUTO: 7.82 K/UL (ref 3.9–12.7)

## 2022-03-21 PROCEDURE — 83036 HEMOGLOBIN GLYCOSYLATED A1C: CPT | Performed by: INTERNAL MEDICINE

## 2022-03-21 PROCEDURE — 36415 COLL VENOUS BLD VENIPUNCTURE: CPT | Performed by: INTERNAL MEDICINE

## 2022-03-21 PROCEDURE — 83735 ASSAY OF MAGNESIUM: CPT | Performed by: INTERNAL MEDICINE

## 2022-03-21 PROCEDURE — 80053 COMPREHEN METABOLIC PANEL: CPT | Performed by: INTERNAL MEDICINE

## 2022-03-21 PROCEDURE — 85025 COMPLETE CBC W/AUTO DIFF WBC: CPT | Performed by: INTERNAL MEDICINE

## 2022-03-21 PROCEDURE — 87389 HIV-1 AG W/HIV-1&-2 AB AG IA: CPT | Performed by: INTERNAL MEDICINE

## 2022-03-21 PROCEDURE — 82607 VITAMIN B-12: CPT | Performed by: INTERNAL MEDICINE

## 2022-03-21 PROCEDURE — 80061 LIPID PANEL: CPT | Performed by: INTERNAL MEDICINE

## 2022-03-22 ENCOUNTER — OFFICE VISIT (OUTPATIENT)
Dept: PSYCHIATRY | Facility: CLINIC | Age: 52
End: 2022-03-22
Payer: COMMERCIAL

## 2022-03-22 ENCOUNTER — PATIENT MESSAGE (OUTPATIENT)
Dept: PSYCHIATRY | Facility: CLINIC | Age: 52
End: 2022-03-22
Payer: COMMERCIAL

## 2022-03-22 DIAGNOSIS — F41.1 GAD (GENERALIZED ANXIETY DISORDER): Primary | ICD-10-CM

## 2022-03-22 DIAGNOSIS — F33.1 MODERATE EPISODE OF RECURRENT MAJOR DEPRESSIVE DISORDER: ICD-10-CM

## 2022-03-22 PROBLEM — F33.9 RECURRENT MAJOR DEPRESSIVE DISORDER: Status: ACTIVE | Noted: 2022-03-22

## 2022-03-22 LAB — HIV 1+2 AB+HIV1 P24 AG SERPL QL IA: NEGATIVE

## 2022-03-22 PROCEDURE — 4010F ACE/ARB THERAPY RXD/TAKEN: CPT | Mod: CPTII,95,, | Performed by: SOCIAL WORKER

## 2022-03-22 PROCEDURE — 90791 PR PSYCHIATRIC DIAGNOSTIC EVALUATION: ICD-10-PCS | Mod: 95,,, | Performed by: SOCIAL WORKER

## 2022-03-22 PROCEDURE — 4010F PR ACE/ARB THEARPY RXD/TAKEN: ICD-10-PCS | Mod: CPTII,95,, | Performed by: SOCIAL WORKER

## 2022-03-22 PROCEDURE — 1159F PR MEDICATION LIST DOCUMENTED IN MEDICAL RECORD: ICD-10-PCS | Mod: CPTII,95,, | Performed by: SOCIAL WORKER

## 2022-03-22 PROCEDURE — 90791 PSYCH DIAGNOSTIC EVALUATION: CPT | Mod: 95,,, | Performed by: SOCIAL WORKER

## 2022-03-22 PROCEDURE — 3051F HG A1C>EQUAL 7.0%<8.0%: CPT | Mod: CPTII,95,, | Performed by: SOCIAL WORKER

## 2022-03-22 PROCEDURE — 3051F PR MOST RECENT HEMOGLOBIN A1C LEVEL 7.0 - < 8.0%: ICD-10-PCS | Mod: CPTII,95,, | Performed by: SOCIAL WORKER

## 2022-03-22 PROCEDURE — 1159F MED LIST DOCD IN RCRD: CPT | Mod: CPTII,95,, | Performed by: SOCIAL WORKER

## 2022-03-22 RX ORDER — GABAPENTIN 400 MG/1
CAPSULE ORAL
Qty: 60 CAPSULE | Refills: 0 | Status: SHIPPED | OUTPATIENT
Start: 2022-03-22 | End: 2022-04-22

## 2022-03-22 RX ORDER — BUPROPION HYDROCHLORIDE 150 MG/1
TABLET ORAL
Qty: 30 TABLET | Refills: 0 | Status: SHIPPED | OUTPATIENT
Start: 2022-03-22 | End: 2022-04-22

## 2022-03-22 RX ORDER — BUSPIRONE HYDROCHLORIDE 15 MG/1
TABLET ORAL
Qty: 60 TABLET | Refills: 0 | Status: SHIPPED | OUTPATIENT
Start: 2022-03-22 | End: 2022-04-22

## 2022-03-22 NOTE — TELEPHONE ENCOUNTER
This Rx Request does not qualify for assessment with the OR   Please review protocol details and the Care Due Message for extra clinical information    Reasons Rx Request may be deferred:  Labs/Vitals abnormal  Patient has been seen in the ED/Hospital since the last PCP visit  Medication is non-delegated (GABAPENTIN AND BUSPAR)    Note composed:4:10 PM 03/22/2022

## 2022-03-22 NOTE — PROGRESS NOTES
Psychiatry Initial Visit (PhD/LCSW)  Diagnostic Interview - CPT 30658    Date: 3/22/2022    Site: [facility]    Referral source: Yesi Mcleod LCSW    Clinical status of patient: Outpatient    HISTORY OF PRESENTING ILLNESS:  Jl Araiza, a 52 y.o. 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 Met with patient.     Patient does not currently have a psychiatrist.    Patient does not currently have a therapist.    Previous Psychiatric Outpatient Treatment:  Yes - Saw Yesi Oghetals- after wife had an affair (2003), Sabina actually still works with Perri, the man she had the affair with. Records show that therapy was from [id number]).  Current medications and drug reactions (include OTC, herbal): see medication list  Pt is taking buspirone (Buspar) 15mg BID for Anxiety. Pt is taking bupropion SR (Wellbutrin) 150mg once daily for Depression. They are interested in medication changes.  Beliefs meds are not working, we discussed talking to his PCP about medication changes, or electing a psychiatrist.   Chief complaint/reason for encounter: Psychological Evaluation and treatment recommendations      Current symptoms:  · Depression: dysphoric mood, anhedonia, insomnia, worthlessness/guilt, fatigue and increased appetite, hx of SI, denies current SI, no plan or intent   · Anxiety: excessive anxiety/worry, restlessness/keyed up and muscle tension, (taken on responsibilities because no one else will)   · Insomnia: early morning awakening and difficulty falling asleep.  · Anai:  denies.  · Psychosis: denies .    No flowsheet data found.  No flowsheet data found.     Current social stressors:   Borderline eating- binging- using food for comfort- 250 lbs right- has diabetes, rewarding myself for whatever.  Escapism (video games) and shopping     Slipped on the concrete- cracked  knee pain, messed up his back, has trouble walking down stairs, screws up exercising. He did PT after accident.     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 His boss is someone he worked with 20 years ago. The boss is taking a back seat. He reported.  He worries about being hired as a 52 y.o. male     They just refinanced the house, and got their note down to 2,200.     FINANANCIAL- paying the bills and taking care of my family   Applied to [facility]     Risk assessment:  Patient reports no suicidal ideation  Patient reports no homicidal ideation  Patient reports no self-injurious behavior  Patient reports no violent behavior    PSYCHIATRIC HISTORY:  Previous Psychiatric Hospitalizations:  No  Previous SI/HI:   Yes - when he was seeing Yesi., last time 6 months, go but able to reality test, my son was reason the reason for living. This was when he found out about the affair. Denies current SI, no intent or plan.   Previous Suicide Attempts:  Yes - plan to jump off a bridge (life-long friend, Marsha, 1988)   Previous Medication Trials: No   Head trauma:  No  History of Trauma:  did not assess  Legal Issues: did not assess  Access to a Gun:  No    SUBSTANCE ABUSE HISTORY:  Tobacco:  No   Alcohol: special occasions  Illicit Substances: No  Misuse of Prescription Medications:  No  Caffeine: none    MEDICAL HISTORY: back pain, and medical issues after fall 2 years ago, contributing to self-worth and mood   Past Medical History:   Diagnosis Date    Abdominal hernia     Anxiety     Essential hypertension 11/2/2015    Hyperlipidemia     Obese     UC (ulcerative colitis)        SOCIAL HISTORY (MARRIAGE, EMPLOYMENT, etc.):  Living Situation: Lives with wife, Sabina, and son, Trae.   Family Life Cycle:  parents were alcoholics, they had a co-dependent relationships, she got liver cancer, and , father was also sick while his mom was sick. After mother  (), within 9 months of her death, he  a women named Neda, and the father  6 months () later. Father with high up in the [employer] about was getting a 100,000,000 dollars in pension, and Pat got it all.   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 He gets excited to do things with him. HE does video games, which he believes is escapism. Teaching self Pashto, likes to reach non-fiction, like to travel. He does wood-working. He does photography.    PSYCHIATRIC FAMILY HISTORY:   Father and wife are Worship.       Strengths and liabilities: Strength: Patient accepts guidance/feedback, Strength: Patient is expressive/articulate., Strength: Patient is intelligent., Strength: Patient is motivated for change., Liability: Patient has poor health., Liability: Patient lacks coping skills.    Current Evaluation:     Mental Status Exam:  General Appearance:  unremarkable, overweight   Speech: normal tone, normal rate, normal pitch, normal volume      Level of Cooperation: cooperative      Thought Processes: illogical   Mood: anxious, depressed      Thought Content: normal, no suicidality, no homicidality, delusions, or paranoia   Affect: congruent and  appropriate   Orientation: Oriented x3   Memory: recent >  intact, remote >  intact   Attention Span & Concentration: intact   Fund of General Knowledge: intact and appropriate to age and level of education   Abstract Reasoning: appropriate   Judgment & Insight: intact     Language  intact     Diagnostic Impression - Plan:     No diagnosis found.    TREATMENT GOALS: Anxiety: modifying schemata of threat/vulnerability/need for control (or other schemata -- specify I am going to die ), reducing negative automatic thoughts, reducing physical symptoms of anxiety and reducing time spent worrying (<30 minutes/day)  Depression: increasing energy, increasing motivation, reducing fatigue and reducing negative automatic thoughts    PLAN: In this session a psychiatric evaluation was conducted to get history and process pt's life. CBT, Interpersonal Processing Therapy  and Mindfulness Techniques will be utilized in future individual  therapy sessions to increase interaction, insight, support, behavior modification and medication management by PCP or Psychiatrist (this was discussed with pt).     Return to Clinic: as scheduled    Length of Service (minutes): 60

## 2022-03-23 ENCOUNTER — PATIENT MESSAGE (OUTPATIENT)
Dept: PRIMARY CARE CLINIC | Facility: CLINIC | Age: 52
End: 2022-03-23
Payer: COMMERCIAL

## 2022-03-23 RX ORDER — METFORMIN HYDROCHLORIDE 500 MG/1
1000 TABLET, EXTENDED RELEASE ORAL 2 TIMES DAILY WITH MEALS
Qty: 120 TABLET | Refills: 2 | Status: SHIPPED | OUTPATIENT
Start: 2022-03-23 | End: 2022-06-30

## 2022-03-24 ENCOUNTER — PATIENT MESSAGE (OUTPATIENT)
Dept: PRIMARY CARE CLINIC | Facility: CLINIC | Age: 52
End: 2022-03-24
Payer: COMMERCIAL

## 2022-04-03 DIAGNOSIS — R79.89 LFTS ABNORMAL: Primary | ICD-10-CM

## 2022-04-04 ENCOUNTER — PATIENT OUTREACH (OUTPATIENT)
Dept: ADMINISTRATIVE | Facility: OTHER | Age: 52
End: 2022-04-04
Payer: COMMERCIAL

## 2022-04-05 ENCOUNTER — OFFICE VISIT (OUTPATIENT)
Dept: ORTHOPEDICS | Facility: CLINIC | Age: 52
End: 2022-04-05
Payer: COMMERCIAL

## 2022-04-05 ENCOUNTER — HOSPITAL ENCOUNTER (OUTPATIENT)
Dept: RADIOLOGY | Facility: HOSPITAL | Age: 52
Discharge: HOME | End: 2022-04-05
Attending: ORTHOPAEDIC SURGERY
Payer: COMMERCIAL

## 2022-04-05 VITALS — BODY MASS INDEX: 39.64 KG/M2 | HEIGHT: 69 IN | WEIGHT: 267.63 LBS

## 2022-04-05 DIAGNOSIS — M25.561 RIGHT KNEE PAIN, UNSPECIFIED CHRONICITY: Primary | ICD-10-CM

## 2022-04-05 DIAGNOSIS — M25.561 RIGHT KNEE PAIN, UNSPECIFIED CHRONICITY: ICD-10-CM

## 2022-04-05 DIAGNOSIS — S76.111A RUPTURE OF RIGHT QUADRICEPS TENDON, INITIAL ENCOUNTER: Primary | ICD-10-CM

## 2022-04-05 PROCEDURE — 99999 PR PBB SHADOW E&M-EST. PATIENT-LVL III: CPT | Mod: PBBFAC,,, | Performed by: ORTHOPAEDIC SURGERY

## 2022-04-05 PROCEDURE — 1160F PR REVIEW ALL MEDS BY PRESCRIBER/CLIN PHARMACIST DOCUMENTED: ICD-10-PCS | Mod: CPTII,S$GLB,, | Performed by: ORTHOPAEDIC SURGERY

## 2022-04-05 PROCEDURE — 3008F BODY MASS INDEX DOCD: CPT | Mod: CPTII,S$GLB,, | Performed by: ORTHOPAEDIC SURGERY

## 2022-04-05 PROCEDURE — 4010F ACE/ARB THERAPY RXD/TAKEN: CPT | Mod: CPTII,S$GLB,, | Performed by: ORTHOPAEDIC SURGERY

## 2022-04-05 PROCEDURE — 4010F PR ACE/ARB THEARPY RXD/TAKEN: ICD-10-PCS | Mod: CPTII,S$GLB,, | Performed by: ORTHOPAEDIC SURGERY

## 2022-04-05 PROCEDURE — 1159F MED LIST DOCD IN RCRD: CPT | Mod: CPTII,S$GLB,, | Performed by: ORTHOPAEDIC SURGERY

## 2022-04-05 PROCEDURE — 3008F PR BODY MASS INDEX (BMI) DOCUMENTED: ICD-10-PCS | Mod: CPTII,S$GLB,, | Performed by: ORTHOPAEDIC SURGERY

## 2022-04-05 PROCEDURE — 73564 XR KNEE ORTHO RIGHT WITH FLEXION: ICD-10-PCS | Mod: 26,RT,, | Performed by: RADIOLOGY

## 2022-04-05 PROCEDURE — 73562 XR KNEE ORTHO RIGHT WITH FLEXION: ICD-10-PCS | Mod: 26,LT,, | Performed by: RADIOLOGY

## 2022-04-05 PROCEDURE — 1159F PR MEDICATION LIST DOCUMENTED IN MEDICAL RECORD: ICD-10-PCS | Mod: CPTII,S$GLB,, | Performed by: ORTHOPAEDIC SURGERY

## 2022-04-05 PROCEDURE — 99999 PR PBB SHADOW E&M-EST. PATIENT-LVL III: ICD-10-PCS | Mod: PBBFAC,,, | Performed by: ORTHOPAEDIC SURGERY

## 2022-04-05 PROCEDURE — 99213 PR OFFICE/OUTPT VISIT, EST, LEVL III, 20-29 MIN: ICD-10-PCS | Mod: S$GLB,,, | Performed by: ORTHOPAEDIC SURGERY

## 2022-04-05 PROCEDURE — 3051F HG A1C>EQUAL 7.0%<8.0%: CPT | Mod: CPTII,S$GLB,, | Performed by: ORTHOPAEDIC SURGERY

## 2022-04-05 PROCEDURE — 73562 X-RAY EXAM OF KNEE 3: CPT | Mod: 26,LT,, | Performed by: RADIOLOGY

## 2022-04-05 PROCEDURE — 99213 OFFICE O/P EST LOW 20 MIN: CPT | Mod: S$GLB,,, | Performed by: ORTHOPAEDIC SURGERY

## 2022-04-05 PROCEDURE — 1160F RVW MEDS BY RX/DR IN RCRD: CPT | Mod: CPTII,S$GLB,, | Performed by: ORTHOPAEDIC SURGERY

## 2022-04-05 PROCEDURE — 3051F PR MOST RECENT HEMOGLOBIN A1C LEVEL 7.0 - < 8.0%: ICD-10-PCS | Mod: CPTII,S$GLB,, | Performed by: ORTHOPAEDIC SURGERY

## 2022-04-05 PROCEDURE — 73564 X-RAY EXAM KNEE 4 OR MORE: CPT | Mod: 26,RT,, | Performed by: RADIOLOGY

## 2022-04-05 PROCEDURE — 73562 X-RAY EXAM OF KNEE 3: CPT | Mod: TC,LT

## 2022-04-05 NOTE — PROGRESS NOTES
Requested updates within Care Everywhere.  Patient's chart was reviewed for overdue PHYLLIS topics.  Open case request for colonoscopy placed 12/23/21  Health maintenance:updated  Immunizations:reconciled   Legacy:   Media:  Orders placed:  Tasked appts:  Labs Linked:  Upcoming appt:

## 2022-04-09 PROBLEM — S76.111A RUPTURE OF RIGHT QUADRICEPS TENDON: Status: ACTIVE | Noted: 2022-04-09

## 2022-04-09 NOTE — PROGRESS NOTES
Subjective:      Patient ID: Jl Araiza is a 52 y.o. male.    Chief Complaint:   Pain of the Right Knee    HPI  He comes in complaining of right knee pain.  He does have some giving way.  His pain is only about 1/10.  Two years ago he slipped on some concrete and injured his knee and ankle.  He has chronic back pain for which she has been getting injections and has had pain going down the leg.  At first, he could not lift his leg into a straight position, but it got better over time.      Objective:        Ortho/SPM Exam  On exam he has a palpable gap above the patella, but he has full active extension.  He walks with a normal gait.  No tenderness.  No effusion.  No instability to ligament stressing.  Distal neurovascular intact.  Skin intact        IMAGING:  Weightbearing x-rays show only mild tricompartmental DJD.  Assessment:   Partial rupture of right quadriceps tendon with mild underlying arthrosis      Plan:   At this stage, I do not think that surgical intervention would do him any good.  He is basically able to do his ADLs and work activities, and has only mild pain.  He has full active extension against gravity.  It certainly would not hurt for him to work on strengthening his quadriceps.  I went over all this with him in detail.  If he feels he needs further intervention.    The patient's pathophysiology was explained in detail with reference to x-rays, models, other visual aids as appropriate.  Treatment options were discussed in detail.  Questions were invited and answered to the patient's satisfaction.    Greater than 50% of this 15 minute visit was devoted to counseling and coordination of care      Jose Brown MD  Orthopedic Surgery

## 2022-04-19 ENCOUNTER — OFFICE VISIT (OUTPATIENT)
Dept: PSYCHIATRY | Facility: CLINIC | Age: 52
End: 2022-04-19
Payer: COMMERCIAL

## 2022-04-19 DIAGNOSIS — F41.1 GAD (GENERALIZED ANXIETY DISORDER): Primary | ICD-10-CM

## 2022-04-19 PROCEDURE — 3051F PR MOST RECENT HEMOGLOBIN A1C LEVEL 7.0 - < 8.0%: ICD-10-PCS | Mod: CPTII,95,, | Performed by: SOCIAL WORKER

## 2022-04-19 PROCEDURE — 90834 PR PSYCHOTHERAPY W/PATIENT, 45 MIN: ICD-10-PCS | Mod: 95,,, | Performed by: SOCIAL WORKER

## 2022-04-19 PROCEDURE — 3051F HG A1C>EQUAL 7.0%<8.0%: CPT | Mod: CPTII,95,, | Performed by: SOCIAL WORKER

## 2022-04-19 PROCEDURE — 4010F PR ACE/ARB THEARPY RXD/TAKEN: ICD-10-PCS | Mod: CPTII,95,, | Performed by: SOCIAL WORKER

## 2022-04-19 PROCEDURE — 90834 PSYTX W PT 45 MINUTES: CPT | Mod: 95,,, | Performed by: SOCIAL WORKER

## 2022-04-19 PROCEDURE — 4010F ACE/ARB THERAPY RXD/TAKEN: CPT | Mod: CPTII,95,, | Performed by: SOCIAL WORKER

## 2022-04-21 NOTE — PROGRESS NOTES
"Individual Psychotherapy (PhD/LCSW)    4/19/2022    Site:  [facility]         Therapeutic Intervention: Met with patient.  Outpatient - Insight oriented psychotherapy 45 min - CPT code 48409    Chief complaint/reason for encounter: anxiety     Interval history and content of current session: Pt has been seen by me extensively in the past and has returned due to feeling increased anxiety recently.  He has changed jobs in the time he hasn't seen me and now is working in web development for [employer] but is wanting to leave that job due to its excessive demands.  His son is now 14 and about to enter high school.  He feels guilty at times because he thinks his son has picked up on his anxiety and become that way himself and he doesn't know how to help him in the best way possible.  He continues to feel stressed to the max and overwhelmed as he takes on too many projects outside of work.  He feels covid has taken a toll on his family as his wife has severe asthma and out of concern for her he has limited what he does in the outside world. He continues to have the nagging feeling of not feeling "good enough" in every role in his life.      Treatment plan:  · Target symptoms: anxiety   · Why chosen therapy is appropriate versus another modality: relevant to diagnosis  · Outcome monitoring methods: self-report, observation  · Therapeutic intervention type: insight oriented psychotherapy    Risk parameters:  Patient reports no suicidal ideation  Patient reports no homicidal ideation  Patient reports no self-injurious behavior  Patient reports no violent behavior    Verbal deficits: None    Patient's response to intervention:  The patient's response to intervention is motivated.    Progress toward goals and other mental status changes:  The patient's progress toward goals is fair .    Diagnosis:     ICD-10-CM ICD-9-CM   1. MYAH (generalized anxiety disorder)  F41.1 300.02       Plan:  individual psychotherapy " and medication management by physician    Return to clinic: as scheduled    Length of Service (minutes): 45

## 2022-04-22 ENCOUNTER — TELEPHONE (OUTPATIENT)
Dept: PAIN MEDICINE | Facility: CLINIC | Age: 52
End: 2022-04-22
Payer: COMMERCIAL

## 2022-04-22 ENCOUNTER — OFFICE VISIT (OUTPATIENT)
Dept: PAIN MEDICINE | Facility: CLINIC | Age: 52
End: 2022-04-22
Payer: COMMERCIAL

## 2022-04-22 VITALS
DIASTOLIC BLOOD PRESSURE: 78 MMHG | WEIGHT: 265.88 LBS | SYSTOLIC BLOOD PRESSURE: 140 MMHG | BODY MASS INDEX: 39.26 KG/M2

## 2022-04-22 DIAGNOSIS — F33.1 MODERATE EPISODE OF RECURRENT MAJOR DEPRESSIVE DISORDER: ICD-10-CM

## 2022-04-22 DIAGNOSIS — F41.1 GAD (GENERALIZED ANXIETY DISORDER): ICD-10-CM

## 2022-04-22 DIAGNOSIS — M51.16 LUMBAR DISC DISEASE WITH RADICULOPATHY: Primary | ICD-10-CM

## 2022-04-22 DIAGNOSIS — M54.16 LUMBAR RADICULOPATHY: Primary | ICD-10-CM

## 2022-04-22 DIAGNOSIS — M51.36 ANNULAR TEAR OF LUMBAR DISC: ICD-10-CM

## 2022-04-22 DIAGNOSIS — M51.36 DDD (DEGENERATIVE DISC DISEASE), LUMBAR: ICD-10-CM

## 2022-04-22 DIAGNOSIS — M51.16 LUMBAR DISC DISEASE WITH RADICULOPATHY: ICD-10-CM

## 2022-04-22 PROCEDURE — 4010F ACE/ARB THERAPY RXD/TAKEN: CPT | Mod: CPTII,S$GLB,, | Performed by: PAIN MEDICINE

## 2022-04-22 PROCEDURE — 3008F BODY MASS INDEX DOCD: CPT | Mod: CPTII,S$GLB,, | Performed by: PAIN MEDICINE

## 2022-04-22 PROCEDURE — 3077F SYST BP >= 140 MM HG: CPT | Mod: CPTII,S$GLB,, | Performed by: PAIN MEDICINE

## 2022-04-22 PROCEDURE — 3051F PR MOST RECENT HEMOGLOBIN A1C LEVEL 7.0 - < 8.0%: ICD-10-PCS | Mod: CPTII,S$GLB,, | Performed by: PAIN MEDICINE

## 2022-04-22 PROCEDURE — 99214 OFFICE O/P EST MOD 30 MIN: CPT | Mod: S$GLB,,, | Performed by: PAIN MEDICINE

## 2022-04-22 PROCEDURE — 99214 PR OFFICE/OUTPT VISIT, EST, LEVL IV, 30-39 MIN: ICD-10-PCS | Mod: S$GLB,,, | Performed by: PAIN MEDICINE

## 2022-04-22 PROCEDURE — 3077F PR MOST RECENT SYSTOLIC BLOOD PRESSURE >= 140 MM HG: ICD-10-PCS | Mod: CPTII,S$GLB,, | Performed by: PAIN MEDICINE

## 2022-04-22 PROCEDURE — 99999 PR PBB SHADOW E&M-EST. PATIENT-LVL III: CPT | Mod: PBBFAC,,, | Performed by: PAIN MEDICINE

## 2022-04-22 PROCEDURE — 3051F HG A1C>EQUAL 7.0%<8.0%: CPT | Mod: CPTII,S$GLB,, | Performed by: PAIN MEDICINE

## 2022-04-22 PROCEDURE — 3078F PR MOST RECENT DIASTOLIC BLOOD PRESSURE < 80 MM HG: ICD-10-PCS | Mod: CPTII,S$GLB,, | Performed by: PAIN MEDICINE

## 2022-04-22 PROCEDURE — 3078F DIAST BP <80 MM HG: CPT | Mod: CPTII,S$GLB,, | Performed by: PAIN MEDICINE

## 2022-04-22 PROCEDURE — 99999 PR PBB SHADOW E&M-EST. PATIENT-LVL III: ICD-10-PCS | Mod: PBBFAC,,, | Performed by: PAIN MEDICINE

## 2022-04-22 PROCEDURE — 4010F PR ACE/ARB THEARPY RXD/TAKEN: ICD-10-PCS | Mod: CPTII,S$GLB,, | Performed by: PAIN MEDICINE

## 2022-04-22 PROCEDURE — 3008F PR BODY MASS INDEX (BMI) DOCUMENTED: ICD-10-PCS | Mod: CPTII,S$GLB,, | Performed by: PAIN MEDICINE

## 2022-04-22 RX ORDER — BUPROPION HYDROCHLORIDE 150 MG/1
TABLET ORAL
Qty: 30 TABLET | Refills: 2 | Status: SHIPPED | OUTPATIENT
Start: 2022-04-22 | End: 2022-06-30 | Stop reason: DRUGHIGH

## 2022-04-22 RX ORDER — BUSPIRONE HYDROCHLORIDE 15 MG/1
TABLET ORAL
Qty: 60 TABLET | Refills: 2 | Status: SHIPPED | OUTPATIENT
Start: 2022-04-22 | End: 2022-07-27

## 2022-04-22 RX ORDER — GABAPENTIN 400 MG/1
CAPSULE ORAL
Qty: 60 CAPSULE | Refills: 2 | Status: SHIPPED | OUTPATIENT
Start: 2022-04-22 | End: 2023-04-11

## 2022-04-22 NOTE — PROGRESS NOTES
[facility] established Patient Evaluation    Referred by: Dr Corine Taylor  Reason for referral: back pain    CC:   Right calf and foot pain  Last 3 PDI Scores 8/7/2020   Pain Disability Index (PDI) 21       HPI:   Jl Araiza is a 52 y.o. male who complains of back pain shooting into the LLE.  He reports chronic low back pain for many years with episodic flare up, but this episode is worse that before and is associated with new symptoms of pain shooting into the right lateral, buttock, thigh, and leg terminating in the toes.  Pain also shoots into the anterior thigh and he reports several episodes of brief weakness where his knee gave out.    Location: back pain  Onset: 2 weeks ago  Current Pain Score: 3/10  Daily Pain of Range: 3-8/10  Quality: Dull, Throbbing, Tingling, Numb and Sharp   Radiation: right leg  Worsened by: lying down, sitting  Improved by: nothing    Interval updates  8/25/[id number]-ybvq-old male presents virtually he is  status post right  TFESI @ L4-5 patient reported 50% relief at this point patient states he continues to have right knee, right calf and right foot pain which actually feels worse his pain is constant described as spasms, burning and shooting.    9/15/2020 - 50-year-old male presents status post  Right L5-S1 and S1 TF ERNESTO patient reporting 75% relief, however he continues to report mild pain in his right calf and intermittent pain in his lateral and bottom of his right foot also reports numbness in his toes.  He states he is able to sleep better at night continues to take the gabapentin 100 mg t.i.d. and Tylenol 500 to a 1000 as needed for pain he reports Aleve due to history of colitis states that his pain is at a constant 2/3/10 but at times can get to a 5/10.  He is also experiencing weakness in his right leg and ankle.     4/22/2022 - Mr. Araiza is following up for foot pain related to lumbar radiculopathy. He received acupuncture since the last visit  which worked well for him x2 but on the third visit there was no relief so he stopped 2/2 economic concern.  Pain is currently rate 3/10 with a weekly range 3-6/10.  It is described as Aching and Numb.   Pain is worsened by: nothing in particular and improved by: medications.  He is still taking gabapentin.  He reports 12-14 months of relief from this pain following the Right TFESI @ L5-S1 and S1 performed in Sept 2020.    Current Pain Medications:  1. Tramadol - not taking currently  2. Gabapentin 400 BID - unsure if helping    Review of Systems:  Review of Systems   Constitutional: Negative for chills and fever.   HENT: Negative for nosebleeds.    Eyes: Negative for blurred vision.   Respiratory: Negative for hemoptysis.    Cardiovascular: Negative for chest pain and palpitations.   Gastrointestinal: Negative for heartburn and vomiting.   Genitourinary: Negative for hematuria.   Musculoskeletal: Positive for falls, joint pain and neck pain. Negative for myalgias.   Skin: Negative for rash.   Neurological: Positive for tingling (RLE) and weakness (RLE). Negative for seizures and loss of consciousness.   Endo/Heme/Allergies: Does not bruise/bleed easily.   Psychiatric/Behavioral: Negative for hallucinations. The patient has insomnia (shooting pain in leg is worse at night).        History:    Current Outpatient Medications:     buPROPion (WELLBUTRIN XL) 150 MG TB24 tablet, TAKE 1 TABLET(150 MG) BY MOUTH EVERY DAY, Disp: 30 tablet, Rfl: 0    busPIRone (BUSPAR) 15 MG tablet, TAKE 1 TABLET(15 MG) BY MOUTH TWICE DAILY, Disp: 60 tablet, Rfl: 0    flash glucose sensor (FREESTYLE AMANDEEP 14 DAY SENSOR) Kit, 1 each by Misc.(Non-Drug; Combo Route) route every 14 (fourteen) days., Disp: 2 kit, Rfl: 11    gabapentin (NEURONTIN) 400 MG capsule, TAKE 1 CAPSULE(400 MG) BY MOUTH TWICE DAILY, Disp: 60 capsule, Rfl: 0    losartan (COZAAR) 100 MG tablet, TAKE 1 TABLET(100 MG) BY MOUTH EVERY DAY, Disp: 30 tablet, Rfl: 6     mesalamine (APRISO) 0.375 gram Cp24, TAKE 4 CAPSULES(1.5 GRAMS) BY MOUTH EVERY DAY, Disp: 360 capsule, Rfl: 0    metFORMIN (GLUCOPHAGE-XR) 500 MG ER 24hr tablet, Take 2 tablets (1,000 mg total) by mouth 2 (two) times daily with meals., Disp: 120 tablet, Rfl: 2    pravastatin (PRAVACHOL) 40 MG tablet, TAKE 1 TABLET(40 MG) BY MOUTH EVERY DAY, Disp: 90 tablet, Rfl: 1    sildenafiL (VIAGRA) 50 MG tablet, TAKE ONE TABLET BY MOUTH DAILY AS NEEDED , Disp: 6 tablet, Rfl: 5    Past Medical History:   Diagnosis Date    Abdominal hernia     Anxiety     Essential hypertension 11/2/2015    Hyperlipidemia     Obese     UC (ulcerative colitis)        Past Surgical History:   Procedure Laterality Date    COLONOSCOPY N/A 6/15/2016    Procedure: COLONOSCOPY;  Surgeon: Edmund Marie MD;  Location: [address]);  Service: Endoscopy;  Laterality: N/A;  Patient reports that during his last Colonoscopy, he did not fall asleep.    COLONOSCOPY N/A 8/4/2017    Procedure: COLONOSCOPY;  Surgeon: Mya Booth MD;  Location: [facility] ([address] [place] [address]);  Service: Endoscopy;  Laterality: N/A;  schedule as 45 minute case    COLONOSCOPY N/A 8/22/2019    Procedure: COLONOSCOPY;  Surgeon: Edmund Marie MD;  Location: [address]);  Service: Endoscopy;  Laterality: N/A;    ESOPHAGOGASTRODUODENOSCOPY      HERNIA REPAIR  Umbilical Hernia    TRANSFORAMINAL EPIDURAL INJECTION OF STEROID Right 8/18/2020    Procedure: Injection,steroid,epidural,transforaminal approach--Right L4-5;  Surgeon: Fernando Cabezas Jr., MD;  Location: [facility];  Service: Pain Management;  Laterality: Right;    TRANSFORAMINAL EPIDURAL INJECTION OF STEROID Right 9/8/2020    Procedure: Injection,steroid,epidural,transforaminal approach--right L5-S1 and S1;  Surgeon: Fernando Cabezas Jr., MD;  Location: [facility];  Service: Pain Management;  Laterality: Right;    VASECTOMY         Family History   Problem Relation Age of Onset    Cancer  Mother         Liver    Alcohol abuse Mother     Liver cancer Mother 54        ETOH liver disease    Cirrhosis Mother 54        ETOH liver disease    Alcohol abuse Father     Cushing syndrome Father     No Known Problems Sister     Stroke Paternal Grandfather     No Known Problems Maternal Aunt     Liver cancer Maternal Uncle     No Known Problems Paternal Aunt     No Known Problems Paternal Uncle     No Known Problems Maternal Grandmother     No Known Problems Maternal Grandfather     No Known Problems Paternal Grandmother     Amblyopia Neg Hx     Blindness Neg Hx     Cataracts Neg Hx     Diabetes Neg Hx     Glaucoma Neg Hx     Hypertension Neg Hx     Macular degeneration Neg Hx     Retinal detachment Neg Hx     Strabismus Neg Hx     Thyroid disease Neg Hx     Celiac disease Neg Hx     Colon cancer Neg Hx     Colon polyps Neg Hx     Crohn's disease Neg Hx     Esophageal cancer Neg Hx     Inflammatory bowel disease Neg Hx     Liver disease Neg Hx     Rectal cancer Neg Hx     Stomach cancer Neg Hx     Ulcerative colitis Neg Hx     Cystic fibrosis Neg Hx     Hemochromatosis Neg Hx     Irritable bowel syndrome Neg Hx     Harmeet's disease Neg Hx     Multiple sclerosis Neg Hx     Tuberculosis Neg Hx     Rheum arthritis Neg Hx     Scleroderma Neg Hx     Lymphoma Neg Hx        Social History     Socioeconomic History    Marital status:    Occupational History    Occupation:      Employer: [employer]   Tobacco Use    Smoking status: Never Smoker    Smokeless tobacco: Never Used   Substance and Sexual Activity    Alcohol use: No     Comment: Infrequently drinks one beer.      Drug use: No    Sexual activity: Yes     Partners: Female     Comment:     Social History Narrative           Review of patient's allergies indicates:   Allergen Reactions    Cat/feline products Anaphylaxis     Difficulty breathing    Cigarette smoke Shortness Of  Breath     Difficulty breathing       Physical Exam:  There were no vitals filed for this visit.  There was no physical exam done for this virtual visit    Imaging:  MRI Lumbar Spine Without Contrast 08/05/2020  FINDINGS:Alignment: Normal.   Vertebrae: Normal marrow signal. No fracture.   Discs: Mild intervertebral disc space narrowing at level of T12-L1 and L1-L2.   Cord: Normal.  Conus terminates at L1   Degenerative findings:   L1-L2: Unremarkable.   L2-L3: Unremarkable.   L3-L4: Left-sided ligamentum flavum hypertrophy with facet joint spurring (axial series 9, image 24) abutting the left posterior aspect of the thecal sac.  No spinal canal stenosis or neural foraminal narrowing.   L4-L5: Mild diffuse disc bulging with annular fissure and no spinal canal stenosis.  Bilateral facet joint hypertrophy resulting in mild bilateral neural foraminal narrowing.   L5-S1: Bilateral facet joint hypertrophy, left more than right, with left-sided 4 mm synovial cyst abutting the exiting nerve root of L5 (axial series 9, image 36).  Mild left-sided neural foraminal narrowing.  No spinal canal stenosis.   Upper sacrum and sacroiliac joints are unremarkable.   Paraspinal muscles & soft tissues: Unremarkable.     Impression:     1. Multilevel spondylosis with no significant spinal canal stenosis, as above.  2. Annular fissure is identified at the level of L4-L5.  3. Synovial cyst is identified at the level L5-S1 abutting the exiting nerve root of L5 with mild left-sided neural foraminal narrowing.         Labs:  BMP  Lab Results   Component Value Date     03/21/2022    K 4.2 03/21/2022     03/21/2022    CO2 25 03/21/2022    BUN 17 03/21/2022    CREATININE 1.1 03/21/2022    CALCIUM 9.9 03/21/2022    ANIONGAP 11 03/21/2022    ESTGFRAFRICA >60.0 03/21/2022    EGFRNONAA >60.0 03/21/2022     Lab Results   Component Value Date    ALT 65 (H) 03/21/2022    AST 28 03/21/2022    ALKPHOS 79 03/21/2022    BILITOT 1.1 (H)  03/21/2022       Assessment:  Problem List Items Addressed This Visit     Annular tear of lumbar disc    Lumbar disc disease with radiculopathy - Primary          8/7/20 - Jl Araiza is a 52 y.o. male with chronic low back pain with intermittent flare-ups now presenting with new radicular symptoms affecting the right lower extremity.  MRI of the lumbar spine shows degenerative disc disease at multiple levels; however, there is an annular tear at L4-5 with a paracentral disc bulge which is likely contacting the descending L5 nerve root, hence the radiating pattern in the L5 dermatome on the right leg.  He is also describing symptoms affecting the L4 dermatome, which is likely due to extrusion of the disc contents at that level causing chemical neuritis.  Patient was advised of these MRI findings and educated on the underlying pathology producing his symptoms.  I have recommended a combination of home exercise, epidural steroid injection, and non opioid medications.  Medications will include gabapentin and tizanidine, both of which will be use primarily at night when he experiences the majority of his symptoms.  We also discussed the role of weight loss and reducing lumbar disc strain.    08/25/20201497-68-voog-old male presents status post right TF ERNESTO targeting L4-5 patient reported 50% relief with this point.  Patient  continues to have right knee, right calf and right foot pain which actually feels worse his pain is constant described as spasms, burning and shooting.  Is currently taking gabapentin 400 mg t.i.d., and tizanidine 4 mg b.i.d. p.r.n..  He and I discussed that considering he is only 7 days postop this procedure that I recommend giving injection more time to be effective.  Also discussed that we may add the L5-S1 level if we considered  repeating the injection MRI does show L5-S1 bilateral facet joint hypertrophy, left more than right with left-sided for mm synovial cyst abutting the existing nerve  root of L5. The dermatome distribution of where he describes his pain would cover L5-S1 ie calf, and foot.     09/15/2322-50-iiwo-old male presents status post  Right L5-S1 and S1 TF ERNESTO patient reporting 75% relief, however he continues to report mild pain in his right calf and intermittent pain in his lateral and bottom of his right foot also reports numbness in his toes.  His pain is secondary to  L5-S1 bilateral facet joint hypertrophy, left more than right with left-sided for synovial cyst abutting the existing nerve root of L5.  I have recommended physical therapy, continue gabapentin 400 mg q.i.d. were also recommend a compound cream that contains probably cane, Lidocaine, and Gabapentin to well upon the right lateral foot b.i.d..      4/22/22 - this is a 52-year-old male with known of lower lumbar pathology who presents reporting return of moderate to severe pain radiating down the posterolateral aspect of the right leg into the foot.  There is degenerative disease and facet hypertrophy contributing to irritation of the L5 nerve root.  He responded well with previous epidural steroid injections reporting 50-75% relief.  The last injection was performed in September of 2020, nearly 2 years ago, and has provided him externally since that time.  I recommend repeating this injection in the form of an interlaminar epidural steroid injection which will allow me to use a Depo formulation of steroid that should provide longer lasting relief.    : Reviewed and consistent with medication use as prescribed.    Treatment Plan:   Procedures: Schedule for LESI @ L5-S1  PT/OT/HEP:  Referral to physical therapy today  Medications:    - continue Tizanidine 4 mg BID PRN   - continue Gabapentin 400 mg PO QID   Labs: reviewed and medications are appropriately dosed for current hepatorenal function.  Imaging: No additional recommended at this time.    Follow Up:  4-6 weeks p LESI    Disclaimer: This note was partly generated  using dictation software which may occasionally result in transcription errors.

## 2022-04-22 NOTE — TELEPHONE ENCOUNTER
Refill Routing Note   Medication(s) are not appropriate for processing by [facility] for the following reason(s):      - Outside of protocol  - Required vitals are abnormal    [facility] action(s):  Defer                Appointments  past 12m or future 3m with PCP    Date Provider   Last Visit   8/9/2021 oCrine Taylor MD   Next Visit   6/30/2022 Corine Taylor MD   ED visits in past 90 days: 0        Note composed:12:37 PM 04/22/2022

## 2022-04-22 NOTE — TELEPHONE ENCOUNTER
No new care gaps identified.  Powered by [employer]. Reference number: [id number].   4/22/2022 8:37:30 AM CDT

## 2022-04-22 NOTE — H&P (VIEW-ONLY)
[facility] established Patient Evaluation    Referred by: Dr Corine Taylor  Reason for referral: back pain    CC:   Right calf and foot pain  Last 3 PDI Scores 8/7/2020   Pain Disability Index (PDI) 21       HPI:   Jl Araiza is a 52 y.o. male who complains of back pain shooting into the LLE.  He reports chronic low back pain for many years with episodic flare up, but this episode is worse that before and is associated with new symptoms of pain shooting into the right lateral, buttock, thigh, and leg terminating in the toes.  Pain also shoots into the anterior thigh and he reports several episodes of brief weakness where his knee gave out.    Location: back pain  Onset: 2 weeks ago  Current Pain Score: 3/10  Daily Pain of Range: 3-8/10  Quality: Dull, Throbbing, Tingling, Numb and Sharp   Radiation: right leg  Worsened by: lying down, sitting  Improved by: nothing    Interval updates  8/25/[id number]-eyfg-old male presents virtually he is  status post right  TFESI @ L4-5 patient reported 50% relief at this point patient states he continues to have right knee, right calf and right foot pain which actually feels worse his pain is constant described as spasms, burning and shooting.    9/15/2020 - 50-year-old male presents status post  Right L5-S1 and S1 TF ERNESTO patient reporting 75% relief, however he continues to report mild pain in his right calf and intermittent pain in his lateral and bottom of his right foot also reports numbness in his toes.  He states he is able to sleep better at night continues to take the gabapentin 100 mg t.i.d. and Tylenol 500 to a 1000 as needed for pain he reports Aleve due to history of colitis states that his pain is at a constant 2/3/10 but at times can get to a 5/10.  He is also experiencing weakness in his right leg and ankle.     4/22/2022 - Mr. Araiza is following up for foot pain related to lumbar radiculopathy. He received acupuncture since the last visit  which worked well for him x2 but on the third visit there was no relief so he stopped 2/2 economic concern.  Pain is currently rate 3/10 with a weekly range 3-6/10.  It is described as Aching and Numb.   Pain is worsened by: nothing in particular and improved by: medications.  He is still taking gabapentin.  He reports 12-14 months of relief from this pain following the Right TFESI @ L5-S1 and S1 performed in Sept 2020.    Current Pain Medications:  1. Tramadol - not taking currently  2. Gabapentin 400 BID - unsure if helping    Review of Systems:  Review of Systems   Constitutional: Negative for chills and fever.   HENT: Negative for nosebleeds.    Eyes: Negative for blurred vision.   Respiratory: Negative for hemoptysis.    Cardiovascular: Negative for chest pain and palpitations.   Gastrointestinal: Negative for heartburn and vomiting.   Genitourinary: Negative for hematuria.   Musculoskeletal: Positive for falls, joint pain and neck pain. Negative for myalgias.   Skin: Negative for rash.   Neurological: Positive for tingling (RLE) and weakness (RLE). Negative for seizures and loss of consciousness.   Endo/Heme/Allergies: Does not bruise/bleed easily.   Psychiatric/Behavioral: Negative for hallucinations. The patient has insomnia (shooting pain in leg is worse at night).        History:    Current Outpatient Medications:     buPROPion (WELLBUTRIN XL) 150 MG TB24 tablet, TAKE 1 TABLET(150 MG) BY MOUTH EVERY DAY, Disp: 30 tablet, Rfl: 0    busPIRone (BUSPAR) 15 MG tablet, TAKE 1 TABLET(15 MG) BY MOUTH TWICE DAILY, Disp: 60 tablet, Rfl: 0    flash glucose sensor (FREESTYLE AMANDEEP 14 DAY SENSOR) Kit, 1 each by Misc.(Non-Drug; Combo Route) route every 14 (fourteen) days., Disp: 2 kit, Rfl: 11    gabapentin (NEURONTIN) 400 MG capsule, TAKE 1 CAPSULE(400 MG) BY MOUTH TWICE DAILY, Disp: 60 capsule, Rfl: 0    losartan (COZAAR) 100 MG tablet, TAKE 1 TABLET(100 MG) BY MOUTH EVERY DAY, Disp: 30 tablet, Rfl: 6     mesalamine (APRISO) 0.375 gram Cp24, TAKE 4 CAPSULES(1.5 GRAMS) BY MOUTH EVERY DAY, Disp: 360 capsule, Rfl: 0    metFORMIN (GLUCOPHAGE-XR) 500 MG ER 24hr tablet, Take 2 tablets (1,000 mg total) by mouth 2 (two) times daily with meals., Disp: 120 tablet, Rfl: 2    pravastatin (PRAVACHOL) 40 MG tablet, TAKE 1 TABLET(40 MG) BY MOUTH EVERY DAY, Disp: 90 tablet, Rfl: 1    sildenafiL (VIAGRA) 50 MG tablet, TAKE ONE TABLET BY MOUTH DAILY AS NEEDED , Disp: 6 tablet, Rfl: 5    Past Medical History:   Diagnosis Date    Abdominal hernia     Anxiety     Essential hypertension 11/2/2015    Hyperlipidemia     Obese     UC (ulcerative colitis)        Past Surgical History:   Procedure Laterality Date    COLONOSCOPY N/A 6/15/2016    Procedure: COLONOSCOPY;  Surgeon: Edmund Marie MD;  Location: [address]);  Service: Endoscopy;  Laterality: N/A;  Patient reports that during his last Colonoscopy, he did not fall asleep.    COLONOSCOPY N/A 8/4/2017    Procedure: COLONOSCOPY;  Surgeon: Mya Booth MD;  Location: [facility] ([address] [place] [address]);  Service: Endoscopy;  Laterality: N/A;  schedule as 45 minute case    COLONOSCOPY N/A 8/22/2019    Procedure: COLONOSCOPY;  Surgeon: Edmund Marie MD;  Location: [address]);  Service: Endoscopy;  Laterality: N/A;    ESOPHAGOGASTRODUODENOSCOPY      HERNIA REPAIR  Umbilical Hernia    TRANSFORAMINAL EPIDURAL INJECTION OF STEROID Right 8/18/2020    Procedure: Injection,steroid,epidural,transforaminal approach--Right L4-5;  Surgeon: Fernando Cabezas Jr., MD;  Location: [facility];  Service: Pain Management;  Laterality: Right;    TRANSFORAMINAL EPIDURAL INJECTION OF STEROID Right 9/8/2020    Procedure: Injection,steroid,epidural,transforaminal approach--right L5-S1 and S1;  Surgeon: Frenando Cabezas Jr., MD;  Location: [facility];  Service: Pain Management;  Laterality: Right;    VASECTOMY         Family History   Problem Relation Age of Onset    Cancer  Mother         Liver    Alcohol abuse Mother     Liver cancer Mother 54        ETOH liver disease    Cirrhosis Mother 54        ETOH liver disease    Alcohol abuse Father     Cushing syndrome Father     No Known Problems Sister     Stroke Paternal Grandfather     No Known Problems Maternal Aunt     Liver cancer Maternal Uncle     No Known Problems Paternal Aunt     No Known Problems Paternal Uncle     No Known Problems Maternal Grandmother     No Known Problems Maternal Grandfather     No Known Problems Paternal Grandmother     Amblyopia Neg Hx     Blindness Neg Hx     Cataracts Neg Hx     Diabetes Neg Hx     Glaucoma Neg Hx     Hypertension Neg Hx     Macular degeneration Neg Hx     Retinal detachment Neg Hx     Strabismus Neg Hx     Thyroid disease Neg Hx     Celiac disease Neg Hx     Colon cancer Neg Hx     Colon polyps Neg Hx     Crohn's disease Neg Hx     Esophageal cancer Neg Hx     Inflammatory bowel disease Neg Hx     Liver disease Neg Hx     Rectal cancer Neg Hx     Stomach cancer Neg Hx     Ulcerative colitis Neg Hx     Cystic fibrosis Neg Hx     Hemochromatosis Neg Hx     Irritable bowel syndrome Neg Hx     Harmeet's disease Neg Hx     Multiple sclerosis Neg Hx     Tuberculosis Neg Hx     Rheum arthritis Neg Hx     Scleroderma Neg Hx     Lymphoma Neg Hx        Social History     Socioeconomic History    Marital status:    Occupational History    Occupation:      Employer: [employer]   Tobacco Use    Smoking status: Never Smoker    Smokeless tobacco: Never Used   Substance and Sexual Activity    Alcohol use: No     Comment: Infrequently drinks one beer.      Drug use: No    Sexual activity: Yes     Partners: Female     Comment:     Social History Narrative           Review of patient's allergies indicates:   Allergen Reactions    Cat/feline products Anaphylaxis     Difficulty breathing    Cigarette smoke Shortness Of  Breath     Difficulty breathing       Physical Exam:  There were no vitals filed for this visit.  There was no physical exam done for this virtual visit    Imaging:  MRI Lumbar Spine Without Contrast 08/05/2020  FINDINGS:Alignment: Normal.   Vertebrae: Normal marrow signal. No fracture.   Discs: Mild intervertebral disc space narrowing at level of T12-L1 and L1-L2.   Cord: Normal.  Conus terminates at L1   Degenerative findings:   L1-L2: Unremarkable.   L2-L3: Unremarkable.   L3-L4: Left-sided ligamentum flavum hypertrophy with facet joint spurring (axial series 9, image 24) abutting the left posterior aspect of the thecal sac.  No spinal canal stenosis or neural foraminal narrowing.   L4-L5: Mild diffuse disc bulging with annular fissure and no spinal canal stenosis.  Bilateral facet joint hypertrophy resulting in mild bilateral neural foraminal narrowing.   L5-S1: Bilateral facet joint hypertrophy, left more than right, with left-sided 4 mm synovial cyst abutting the exiting nerve root of L5 (axial series 9, image 36).  Mild left-sided neural foraminal narrowing.  No spinal canal stenosis.   Upper sacrum and sacroiliac joints are unremarkable.   Paraspinal muscles & soft tissues: Unremarkable.     Impression:     1. Multilevel spondylosis with no significant spinal canal stenosis, as above.  2. Annular fissure is identified at the level of L4-L5.  3. Synovial cyst is identified at the level L5-S1 abutting the exiting nerve root of L5 with mild left-sided neural foraminal narrowing.         Labs:  BMP  Lab Results   Component Value Date     03/21/2022    K 4.2 03/21/2022     03/21/2022    CO2 25 03/21/2022    BUN 17 03/21/2022    CREATININE 1.1 03/21/2022    CALCIUM 9.9 03/21/2022    ANIONGAP 11 03/21/2022    ESTGFRAFRICA >60.0 03/21/2022    EGFRNONAA >60.0 03/21/2022     Lab Results   Component Value Date    ALT 65 (H) 03/21/2022    AST 28 03/21/2022    ALKPHOS 79 03/21/2022    BILITOT 1.1 (H)  03/21/2022       Assessment:  Problem List Items Addressed This Visit     Annular tear of lumbar disc    Lumbar disc disease with radiculopathy - Primary          8/7/20 - Jl Araiza is a 52 y.o. male with chronic low back pain with intermittent flare-ups now presenting with new radicular symptoms affecting the right lower extremity.  MRI of the lumbar spine shows degenerative disc disease at multiple levels; however, there is an annular tear at L4-5 with a paracentral disc bulge which is likely contacting the descending L5 nerve root, hence the radiating pattern in the L5 dermatome on the right leg.  He is also describing symptoms affecting the L4 dermatome, which is likely due to extrusion of the disc contents at that level causing chemical neuritis.  Patient was advised of these MRI findings and educated on the underlying pathology producing his symptoms.  I have recommended a combination of home exercise, epidural steroid injection, and non opioid medications.  Medications will include gabapentin and tizanidine, both of which will be use primarily at night when he experiences the majority of his symptoms.  We also discussed the role of weight loss and reducing lumbar disc strain.    08/25/[id number]-xodh-old male presents status post right TF ERNESTO targeting L4-5 patient reported 50% relief with this point.  Patient  continues to have right knee, right calf and right foot pain which actually feels worse his pain is constant described as spasms, burning and shooting.  Is currently taking gabapentin 400 mg t.i.d., and tizanidine 4 mg b.i.d. p.r.n..  He and I discussed that considering he is only 7 days postop this procedure that I recommend giving injection more time to be effective.  Also discussed that we may add the L5-S1 level if we considered  repeating the injection MRI does show L5-S1 bilateral facet joint hypertrophy, left more than right with left-sided for mm synovial cyst abutting the existing nerve  root of L5. The dermatome distribution of where he describes his pain would cover L5-S1 ie calf, and foot.     09/15/2238-53-alcl-old male presents status post  Right L5-S1 and S1 TF ERNESTO patient reporting 75% relief, however he continues to report mild pain in his right calf and intermittent pain in his lateral and bottom of his right foot also reports numbness in his toes.  His pain is secondary to  L5-S1 bilateral facet joint hypertrophy, left more than right with left-sided for synovial cyst abutting the existing nerve root of L5.  I have recommended physical therapy, continue gabapentin 400 mg q.i.d. were also recommend a compound cream that contains probably cane, Lidocaine, and Gabapentin to well upon the right lateral foot b.i.d..      4/22/22 - this is a 52-year-old male with known of lower lumbar pathology who presents reporting return of moderate to severe pain radiating down the posterolateral aspect of the right leg into the foot.  There is degenerative disease and facet hypertrophy contributing to irritation of the L5 nerve root.  He responded well with previous epidural steroid injections reporting 50-75% relief.  The last injection was performed in September of 2020, nearly 2 years ago, and has provided him externally since that time.  I recommend repeating this injection in the form of an interlaminar epidural steroid injection which will allow me to use a Depo formulation of steroid that should provide longer lasting relief.    : Reviewed and consistent with medication use as prescribed.    Treatment Plan:   Procedures: Schedule for LESI @ L5-S1  PT/OT/HEP:  Referral to physical therapy today  Medications:    - continue Tizanidine 4 mg BID PRN   - continue Gabapentin 400 mg PO QID   Labs: reviewed and medications are appropriately dosed for current hepatorenal function.  Imaging: No additional recommended at this time.    Follow Up:  4-6 weeks p LESI    Disclaimer: This note was partly generated  using dictation software which may occasionally result in transcription errors.

## 2022-04-25 ENCOUNTER — LAB VISIT (OUTPATIENT)
Dept: LAB | Facility: HOSPITAL | Age: 52
End: 2022-04-25
Attending: INTERNAL MEDICINE
Payer: COMMERCIAL

## 2022-04-25 ENCOUNTER — OFFICE VISIT (OUTPATIENT)
Dept: HEPATOLOGY | Facility: CLINIC | Age: 52
End: 2022-04-25
Payer: COMMERCIAL

## 2022-04-25 VITALS
WEIGHT: 263.44 LBS | SYSTOLIC BLOOD PRESSURE: 151 MMHG | HEART RATE: 97 BPM | RESPIRATION RATE: 18 BRPM | BODY MASS INDEX: 39.02 KG/M2 | HEIGHT: 69 IN | DIASTOLIC BLOOD PRESSURE: 84 MMHG | TEMPERATURE: 97 F | OXYGEN SATURATION: 95 %

## 2022-04-25 DIAGNOSIS — E66.01 SEVERE OBESITY (BMI 35.0-39.9) WITH COMORBIDITY: ICD-10-CM

## 2022-04-25 DIAGNOSIS — R79.89 LFTS ABNORMAL: ICD-10-CM

## 2022-04-25 DIAGNOSIS — I10 ESSENTIAL HYPERTENSION: ICD-10-CM

## 2022-04-25 DIAGNOSIS — E11.9 TYPE 2 DIABETES MELLITUS WITHOUT COMPLICATION, WITHOUT LONG-TERM CURRENT USE OF INSULIN: ICD-10-CM

## 2022-04-25 DIAGNOSIS — R74.8 ELEVATED LIVER ENZYMES: Primary | ICD-10-CM

## 2022-04-25 DIAGNOSIS — R17 ELEVATED BILIRUBIN: ICD-10-CM

## 2022-04-25 DIAGNOSIS — E78.5 HYPERLIPIDEMIA, UNSPECIFIED HYPERLIPIDEMIA TYPE: ICD-10-CM

## 2022-04-25 LAB
CERULOPLASMIN SERPL-MCNC: 27 MG/DL (ref 15–45)
FERRITIN SERPL-MCNC: 104 NG/ML (ref 20–300)
IGA SERPL-MCNC: 272 MG/DL (ref 40–350)
IGG SERPL-MCNC: 1057 MG/DL (ref 650–1600)
IRON SERPL-MCNC: 71 UG/DL (ref 45–160)
SATURATED IRON: 16 % (ref 20–50)
TOTAL IRON BINDING CAPACITY: 457 UG/DL (ref 250–450)
TRANSFERRIN SERPL-MCNC: 309 MG/DL (ref 200–375)

## 2022-04-25 PROCEDURE — 99203 PR OFFICE/OUTPT VISIT, NEW, LEVL III, 30-44 MIN: ICD-10-PCS | Mod: S$GLB,,, | Performed by: NURSE PRACTITIONER

## 2022-04-25 PROCEDURE — 84466 ASSAY OF TRANSFERRIN: CPT | Performed by: INTERNAL MEDICINE

## 2022-04-25 PROCEDURE — 86039 ANTINUCLEAR ANTIBODIES (ANA): CPT | Performed by: INTERNAL MEDICINE

## 2022-04-25 PROCEDURE — 3051F HG A1C>EQUAL 7.0%<8.0%: CPT | Mod: CPTII,S$GLB,, | Performed by: NURSE PRACTITIONER

## 2022-04-25 PROCEDURE — 86038 ANTINUCLEAR ANTIBODIES: CPT | Performed by: INTERNAL MEDICINE

## 2022-04-25 PROCEDURE — 3008F BODY MASS INDEX DOCD: CPT | Mod: CPTII,S$GLB,, | Performed by: NURSE PRACTITIONER

## 2022-04-25 PROCEDURE — 86235 NUCLEAR ANTIGEN ANTIBODY: CPT | Mod: 91 | Performed by: INTERNAL MEDICINE

## 2022-04-25 PROCEDURE — 3079F PR MOST RECENT DIASTOLIC BLOOD PRESSURE 80-89 MM HG: ICD-10-PCS | Mod: CPTII,S$GLB,, | Performed by: NURSE PRACTITIONER

## 2022-04-25 PROCEDURE — 99203 OFFICE O/P NEW LOW 30 MIN: CPT | Mod: S$GLB,,, | Performed by: NURSE PRACTITIONER

## 2022-04-25 PROCEDURE — 1160F PR REVIEW ALL MEDS BY PRESCRIBER/CLIN PHARMACIST DOCUMENTED: ICD-10-PCS | Mod: CPTII,S$GLB,, | Performed by: NURSE PRACTITIONER

## 2022-04-25 PROCEDURE — 86790 VIRUS ANTIBODY NOS: CPT | Performed by: INTERNAL MEDICINE

## 2022-04-25 PROCEDURE — 3051F PR MOST RECENT HEMOGLOBIN A1C LEVEL 7.0 - < 8.0%: ICD-10-PCS | Mod: CPTII,S$GLB,, | Performed by: NURSE PRACTITIONER

## 2022-04-25 PROCEDURE — 1160F RVW MEDS BY RX/DR IN RCRD: CPT | Mod: CPTII,S$GLB,, | Performed by: NURSE PRACTITIONER

## 2022-04-25 PROCEDURE — 82784 ASSAY IGA/IGD/IGG/IGM EACH: CPT | Mod: 59 | Performed by: INTERNAL MEDICINE

## 2022-04-25 PROCEDURE — 1159F MED LIST DOCD IN RCRD: CPT | Mod: CPTII,S$GLB,, | Performed by: NURSE PRACTITIONER

## 2022-04-25 PROCEDURE — 99999 PR PBB SHADOW E&M-EST. PATIENT-LVL V: CPT | Mod: PBBFAC,,, | Performed by: NURSE PRACTITIONER

## 2022-04-25 PROCEDURE — 82390 ASSAY OF CERULOPLASMIN: CPT | Performed by: INTERNAL MEDICINE

## 2022-04-25 PROCEDURE — 82784 ASSAY IGA/IGD/IGG/IGM EACH: CPT | Performed by: INTERNAL MEDICINE

## 2022-04-25 PROCEDURE — 3077F SYST BP >= 140 MM HG: CPT | Mod: CPTII,S$GLB,, | Performed by: NURSE PRACTITIONER

## 2022-04-25 PROCEDURE — 99999 PR PBB SHADOW E&M-EST. PATIENT-LVL V: ICD-10-PCS | Mod: PBBFAC,,, | Performed by: NURSE PRACTITIONER

## 2022-04-25 PROCEDURE — 82103 ALPHA-1-ANTITRYPSIN TOTAL: CPT | Performed by: INTERNAL MEDICINE

## 2022-04-25 PROCEDURE — 86235 NUCLEAR ANTIGEN ANTIBODY: CPT | Mod: 59 | Performed by: INTERNAL MEDICINE

## 2022-04-25 PROCEDURE — 4010F PR ACE/ARB THEARPY RXD/TAKEN: ICD-10-PCS | Mod: CPTII,S$GLB,, | Performed by: NURSE PRACTITIONER

## 2022-04-25 PROCEDURE — 1159F PR MEDICATION LIST DOCUMENTED IN MEDICAL RECORD: ICD-10-PCS | Mod: CPTII,S$GLB,, | Performed by: NURSE PRACTITIONER

## 2022-04-25 PROCEDURE — 4010F ACE/ARB THERAPY RXD/TAKEN: CPT | Mod: CPTII,S$GLB,, | Performed by: NURSE PRACTITIONER

## 2022-04-25 PROCEDURE — 83516 IMMUNOASSAY NONANTIBODY: CPT | Performed by: INTERNAL MEDICINE

## 2022-04-25 PROCEDURE — 82728 ASSAY OF FERRITIN: CPT | Performed by: INTERNAL MEDICINE

## 2022-04-25 PROCEDURE — 82104 ALPHA-1-ANTITRYPSIN PHENO: CPT

## 2022-04-25 PROCEDURE — 3077F PR MOST RECENT SYSTOLIC BLOOD PRESSURE >= 140 MM HG: ICD-10-PCS | Mod: CPTII,S$GLB,, | Performed by: NURSE PRACTITIONER

## 2022-04-25 PROCEDURE — 86706 HEP B SURFACE ANTIBODY: CPT | Performed by: INTERNAL MEDICINE

## 2022-04-25 PROCEDURE — 80074 ACUTE HEPATITIS PANEL: CPT | Performed by: INTERNAL MEDICINE

## 2022-04-25 PROCEDURE — 86256 FLUORESCENT ANTIBODY TITER: CPT | Performed by: INTERNAL MEDICINE

## 2022-04-25 PROCEDURE — 3079F DIAST BP 80-89 MM HG: CPT | Mod: CPTII,S$GLB,, | Performed by: NURSE PRACTITIONER

## 2022-04-25 PROCEDURE — 36415 COLL VENOUS BLD VENIPUNCTURE: CPT | Performed by: INTERNAL MEDICINE

## 2022-04-25 PROCEDURE — 3008F PR BODY MASS INDEX (BMI) DOCUMENTED: ICD-10-PCS | Mod: CPTII,S$GLB,, | Performed by: NURSE PRACTITIONER

## 2022-04-25 NOTE — PATIENT INSTRUCTIONS
1. Fibroscan to look for fat or scar tissue in the liver with return to clinic   2. Will check immunity markers for Hepatitis A and B and arrange for vaccination if needed  3. Labs with US to  check for a few other causes of liver disease. These labs will release to you as soon as they are resulted but we will discuss them in detail at your upcoming visit to discuss what the lab results mean.   4.  Follow up based on results of US and labs

## 2022-04-25 NOTE — PROGRESS NOTES
[facility] VISIT NEW PT NOTE    REFERRING PROVIDER:  Dr. Edmund Marie  PCP: Corine Taylor MD     CHIEF COMPLAINT: elevated liver enzymes     HPI: This is a 52 y.o. White male with PMH noted below, presenting for evaluation of   elevated liver enzymes    Previous serologic w/u negative for Harmeet's, hemochromatosis, normal IgG and viral hepatitis A, B and C (years ago) - will complete full sero w/u    Prior serologic workup:   Lab Results   Component Value Date    IGGSERUM 1057 04/25/2022    FERRITIN 104 04/25/2022    FESATURATED 16 (L) 04/25/2022    CERULOPLSM 27.0 04/25/2022    HEPBSAG Negative 06/27/2019    HEPCAB Negative 04/22/2015    HEPAIGM Negative 04/22/2015       Liver fibrosis staging:  -- fibroscan with RTC    Risk factors for NAFLD include obesity, HTN, HLD, DM    Interval HPI: Presents today alone. Is overwhelmed with multiple medical appts/visits upcoming  On Statin since 2019  No Herbal medications, no  New medications or med changes     Labs done 3/2022 show elevated transaminase levels (ALT > AST, elevated intermittently since 2018)  Platelets WNL, alk phos WNL  Elevated bili (ranging 1-1.6 since 2004), no fractionation  Synthetic liver functioning : will repeat     Lab Results   Component Value Date    ALT 65 (H) 03/21/2022    AST 28 03/21/2022    ALKPHOS 79 03/21/2022    BILITOT 1.1 (H) 03/21/2022    ALBUMIN 3.9 03/21/2022     03/21/2022       No US in the past, will arrange     Denies family history of liver disease . Denies significant alcohol consumption currently or in the past    Immunity to Hep A and B - will check with next labs          Allergy and medication list reviewed and updated     PMHX:  has a past medical history of Abdominal hernia, Anxiety, Essential hypertension (11/2/2015), Hyperlipidemia, Obese, and UC (ulcerative colitis).    PSHX:  has a past surgical history that includes Hernia repair (Umbilical Hernia); Colonoscopy (N/A, 6/15/2016);  "Esophagogastroduodenoscopy; Vasectomy; Colonoscopy (N/A, 8/4/2017); Colonoscopy (N/A, 8/22/2019); Transforaminal epidural injection of steroid (Right, 8/18/2020); and Transforaminal epidural injection of steroid (Right, 9/8/2020).    FAMILY HISTORY: Updated and reviewed in [facility]    SOCIAL HISTORY:   Social History     Substance and Sexual Activity   Alcohol Use No    Comment: Infrequently drinks one beer.         Social History     Substance and Sexual Activity   Drug Use No       ROS:   GENERAL: Denies fatigue  CARDIOVASCULAR: Denies edema  GI: Denies abdominal pain  SKIN: Denies rash, itching   NEURO: Denies confusion, memory loss, or mood changes    PHYSICAL EXAM:   Friendly White male, in no acute distress; alert and oriented to person, place and time  VITALS: BP (!) 151/84 (BP Location: Right arm, Patient Position: Sitting, BP Method: Large (Automatic))   Pulse 97   Temp 97 °F (36.1 °C) (Oral)   Resp 18   Ht 5' 9" (1.753 m)   Wt 119.5 kg (263 lb 7.2 oz)   SpO2 95%   BMI 38.90 kg/m²   EYES: Sclerae anicteric  GI: Soft, non-tender, non-distended. No ascites.  EXTREMITIES:  No edema.  SKIN: Warm and dry. No jaundice. No telangectasias noted. No palmar erythema.  NEURO:  No asterixis.  PSYCH:  Thought and speech pattern appropriate. Behavior normal      EDUCATION:  See instructions discussed with patient in Instructions section of the After Visit Summary     ASSESSMENT & PLAN:  52 y.o. White male with:  1. Elevated liver enzymes   -- Labs note elevated transaminase levels (ALT > AST, elevated intermittently since 2018)  --- synthetic liver function : mildly elevated bili, will fractionate   --- Abd US to be done soon   -- do not suspect any medications contributing   --- previous serological work up : some still pending from Dr. Longoria then will complete full sero w/u  --- Hep A and B immunity: will check today, will arrange Hep A and B vaccines if needed    -- labs and US soon   No orders of the defined " types were placed in this encounter.     -- fibroscan either with RTC soon or in 6 months (ok to defer for 6 months if suspect fatty liver per US)    2. Elevated bilirubin   -- full serological workup to be done to assess for possible causes  -- T bili 1-1.6, will fractionate     3. HTN, HLD, Obesity, DM  Body mass index is 38.9 kg/m².   Can increase the risk of fatty liver       Labs and uS soon then   Follow up for pending results of workup. - based on results of US and labs   Orders Placed This Encounter   Procedures    US Abdomen Complete    Hepatic Function Panel    CK    UGT1A1 Genotype    Protime-INR    Hepatitis B Core Antibody, Total        Thank you for allowing me to participate in the care of SHARON Leyva    I spent a total of 30 minutes on the day of the visit.This includes face to face time and non-face to face time preparing to see the patient (eg, review of tests), obtaining and/or reviewing separately obtained history, documenting clinical information in the electronic or other health record, independently interpreting results and communicating results to the patient/family/caregiver, and coordinating care.         CC'ed note to:   Edmund Marie MD Christie V Degrange, MD

## 2022-04-26 LAB
ANA PATTERN 1: NORMAL
ANA SER QL IF: POSITIVE
ANA TITR SER IF: NORMAL {TITER}
HAV IGG SER QL IA: NEGATIVE
HAV IGM SERPL QL IA: NEGATIVE
HBV CORE IGM SERPL QL IA: NEGATIVE
HBV SURFACE AB SER QL IA: POSITIVE
HBV SURFACE AB SERPL IA-ACNC: 369 MIU/ML
HBV SURFACE AG SERPL QL IA: NEGATIVE
HCV AB SERPL QL IA: NEGATIVE

## 2022-04-27 ENCOUNTER — PATIENT MESSAGE (OUTPATIENT)
Dept: ORTHOPEDICS | Facility: CLINIC | Age: 52
End: 2022-04-27
Payer: COMMERCIAL

## 2022-04-27 LAB
MITOCHONDRIA AB TITR SER IF: NORMAL {TITER}
SMOOTH MUSCLE AB TITR SER IF: NORMAL {TITER}

## 2022-04-27 NOTE — DISCHARGE INSTRUCTIONS
Home Care Instructions Pain Management:    1.  DIET:    You may resume your normal diet today.    2.  BATHING:    You may shower with luke warm water.    3.  DRESSING:    You may remove your bandage today.    4.  ACTIVITY LEVEL:      You may resume your normal activities 24 hours after your procedure.    5.  MEDICATIONS:    You may resume your normal medications today.    6.  SPECIAL INSTRUCTIONS:    No heat to the injection site for 24 hours including bath or shower, heating pad, moist heat or hot tubs.    Use an ice pack to the injection site for any pain or discomfort.  Apply ice packs for 20 minute intervals as needed.    If you have received any sedatives by mouth today, you can not drive for 12 hours.    If you have received sedation through an IV, you can not drive for 24 hours.    PLEASE CALL YOUR DOCTOR FOR THE FOLLOWIN.  Redness or swelling around the injection site.  2.  Fever of 101 degrees.  3.  Drainage (pus) from the injection site.  4.  For any continuous bleeding (some dried blood over the incision is normal.)    FOR EMERGENCIES:    If any unusual problems or difficulties occur during clinic hours, call [phone] or dial [phone].    Follow up with with your physician in 2-3 weeks.

## 2022-04-28 LAB
ANTI SM ANTIBODY: 0.07 RATIO (ref 0–0.99)
ANTI SM/RNP ANTIBODY: 0.09 RATIO (ref 0–0.99)
ANTI-SM INTERPRETATION: NEGATIVE
ANTI-SM/RNP INTERPRETATION: NEGATIVE
ANTI-SSA ANTIBODY: 0.07 RATIO (ref 0–0.99)
ANTI-SSA INTERPRETATION: NEGATIVE
ANTI-SSB ANTIBODY: 0.06 RATIO (ref 0–0.99)
ANTI-SSB INTERPRETATION: NEGATIVE
DSDNA AB SER-ACNC: NORMAL [IU]/ML
TTG IGA SER-ACNC: 9 UNITS

## 2022-04-29 LAB — ARUP MISCELLANEOUS TEST 1: NORMAL

## 2022-05-01 DIAGNOSIS — R76.8 ANA POSITIVE: Primary | ICD-10-CM

## 2022-05-01 DIAGNOSIS — Z23 ENCOUNTER FOR VACCINATION: ICD-10-CM

## 2022-05-01 RX ORDER — FERROUS GLUCONATE 324(38)MG
324 TABLET ORAL
Qty: 90 TABLET | Refills: 1 | Status: SHIPPED | OUTPATIENT
Start: 2022-05-01 | End: 2022-07-09 | Stop reason: SDUPTHER

## 2022-05-01 NOTE — PROGRESS NOTES
"David - please tell patient that their Hepatitis A, B and C labs are negative but they have "No" immunity to   Hepatitis A or hepatitis- C.    He has immunity to hepatitis B which is a good thing.    There is currently No vaccination yet for Hepatitis C.    There is vaccinations for Hepatitis A a,  and Recommend the Hepatitis A vaccination series.     Hepatitis A vaccination series is a 2 part vaccine series - Day 1, and then again in 6-months from first one.    Orders were  placed.    David - please tell patient that they are iron deficient but anemic and recommend that they take ferrous gluconate one 324mg pill once daily for next 3 months.    Please order repeat fasting BRIGITTE, Hemoglobin, Iron/TIBC, and Ferritin in 8 weeks - Orders placed.     Jl you had a very nonspecific slightly positive BRIGITTE which is nonspecific he was a very low level will repeat that in 8 weeks  "

## 2022-05-02 ENCOUNTER — TELEPHONE (OUTPATIENT)
Dept: PAIN MEDICINE | Facility: CLINIC | Age: 52
End: 2022-05-02
Payer: COMMERCIAL

## 2022-05-02 ENCOUNTER — OFFICE VISIT (OUTPATIENT)
Dept: PSYCHIATRY | Facility: CLINIC | Age: 52
End: 2022-05-02
Payer: COMMERCIAL

## 2022-05-02 DIAGNOSIS — F41.1 GAD (GENERALIZED ANXIETY DISORDER): Primary | ICD-10-CM

## 2022-05-02 PROCEDURE — 3051F PR MOST RECENT HEMOGLOBIN A1C LEVEL 7.0 - < 8.0%: ICD-10-PCS | Mod: CPTII,S$GLB,, | Performed by: SOCIAL WORKER

## 2022-05-02 PROCEDURE — 90834 PR PSYCHOTHERAPY W/PATIENT, 45 MIN: ICD-10-PCS | Mod: S$GLB,,, | Performed by: SOCIAL WORKER

## 2022-05-02 PROCEDURE — 99999 PR PBB SHADOW E&M-EST. PATIENT-LVL I: CPT | Mod: PBBFAC,,, | Performed by: SOCIAL WORKER

## 2022-05-02 PROCEDURE — 4010F ACE/ARB THERAPY RXD/TAKEN: CPT | Mod: CPTII,S$GLB,, | Performed by: SOCIAL WORKER

## 2022-05-02 PROCEDURE — 99999 PR PBB SHADOW E&M-EST. PATIENT-LVL I: ICD-10-PCS | Mod: PBBFAC,,, | Performed by: SOCIAL WORKER

## 2022-05-02 PROCEDURE — 3051F HG A1C>EQUAL 7.0%<8.0%: CPT | Mod: CPTII,S$GLB,, | Performed by: SOCIAL WORKER

## 2022-05-02 PROCEDURE — 4010F PR ACE/ARB THEARPY RXD/TAKEN: ICD-10-PCS | Mod: CPTII,S$GLB,, | Performed by: SOCIAL WORKER

## 2022-05-02 PROCEDURE — 90834 PSYTX W PT 45 MINUTES: CPT | Mod: S$GLB,,, | Performed by: SOCIAL WORKER

## 2022-05-02 NOTE — PROGRESS NOTES
Individual Psychotherapy (PhD/LCSW)    5/2/2022    Site:  [facility]         Therapeutic Intervention: Met with patient.  Outpatient - Insight oriented psychotherapy 45 min - CPT code 03318    Chief complaint/reason for encounter: anxiety     Interval history and content of current session: Pt seen in office. He talks about frustration with his wife and her unhappiness with her job and having to listen to her about this, also problems with his job and the unrealistic expectations there.  His health is also not going well at the moment with chronic back and knee and foot pain on one side due to a fall 2 years ago..  He would like to work on stress eating and having a healthier life style.    Treatment plan:  · Target symptoms: anxiety   · Why chosen therapy is appropriate versus another modality: relevant to diagnosis  · Outcome monitoring methods: self-report, observation  · Therapeutic intervention type: insight oriented psychotherapy    Risk parameters:  Patient reports no suicidal ideation  Patient reports no homicidal ideation  Patient reports no self-injurious behavior  Patient reports no violent behavior    Verbal deficits: None    Patient's response to intervention:  The patient's response to intervention is motivated.    Progress toward goals and other mental status changes:  The patient's progress toward goals is fair .    Diagnosis:     ICD-10-CM ICD-9-CM   1. MYAH (generalized anxiety disorder)  F41.1 300.02       Plan:  individual psychotherapy and medication management by physician    Return to clinic: as scheduled    Length of Service (minutes): 45

## 2022-05-03 ENCOUNTER — HOSPITAL ENCOUNTER (OUTPATIENT)
Facility: HOSPITAL | Age: 52
Discharge: HOME | End: 2022-05-03
Attending: PAIN MEDICINE | Admitting: PAIN MEDICINE
Payer: COMMERCIAL

## 2022-05-03 ENCOUNTER — PATIENT MESSAGE (OUTPATIENT)
Dept: ADMINISTRATIVE | Facility: HOSPITAL | Age: 52
End: 2022-05-03
Payer: COMMERCIAL

## 2022-05-03 ENCOUNTER — PATIENT OUTREACH (OUTPATIENT)
Dept: ADMINISTRATIVE | Facility: HOSPITAL | Age: 52
End: 2022-05-03
Payer: COMMERCIAL

## 2022-05-03 VITALS
RESPIRATION RATE: 16 BRPM | TEMPERATURE: 98 F | DIASTOLIC BLOOD PRESSURE: 89 MMHG | OXYGEN SATURATION: 95 % | WEIGHT: 263 LBS | HEIGHT: 69 IN | HEART RATE: 94 BPM | SYSTOLIC BLOOD PRESSURE: 143 MMHG | BODY MASS INDEX: 38.95 KG/M2

## 2022-05-03 DIAGNOSIS — G89.29 CHRONIC PAIN: ICD-10-CM

## 2022-05-03 DIAGNOSIS — M51.16 LUMBAR DISC DISEASE WITH RADICULOPATHY: Primary | ICD-10-CM

## 2022-05-03 PROCEDURE — 25500020 PHARM REV CODE 255: Performed by: PAIN MEDICINE

## 2022-05-03 PROCEDURE — 25000003 PHARM REV CODE 250: Performed by: PAIN MEDICINE

## 2022-05-03 PROCEDURE — 62323 NJX INTERLAMINAR LMBR/SAC: CPT | Performed by: PAIN MEDICINE

## 2022-05-03 PROCEDURE — 62323 PR INJ LUMBAR/SACRAL, W/IMAGING GUIDANCE: ICD-10-PCS | Mod: ,,, | Performed by: PAIN MEDICINE

## 2022-05-03 PROCEDURE — 63600175 PHARM REV CODE 636 W HCPCS: Performed by: PAIN MEDICINE

## 2022-05-03 PROCEDURE — 62323 NJX INTERLAMINAR LMBR/SAC: CPT | Mod: ,,, | Performed by: PAIN MEDICINE

## 2022-05-03 RX ORDER — LIDOCAINE HYDROCHLORIDE 10 MG/ML
INJECTION, SOLUTION EPIDURAL; INFILTRATION; INTRACAUDAL; PERINEURAL
Status: DISCONTINUED | OUTPATIENT
Start: 2022-05-03 | End: 2022-05-03 | Stop reason: HOSPADM

## 2022-05-03 RX ORDER — INDOMETHACIN 25 MG/1
CAPSULE ORAL
Status: DISCONTINUED | OUTPATIENT
Start: 2022-05-03 | End: 2022-05-03 | Stop reason: HOSPADM

## 2022-05-03 RX ORDER — LIDOCAINE HYDROCHLORIDE 10 MG/ML
INJECTION INFILTRATION; PERINEURAL
Status: DISCONTINUED | OUTPATIENT
Start: 2022-05-03 | End: 2022-05-03 | Stop reason: HOSPADM

## 2022-05-03 RX ORDER — ALPRAZOLAM 0.5 MG/1
0.5 TABLET, ORALLY DISINTEGRATING ORAL ONCE AS NEEDED
Status: DISCONTINUED | OUTPATIENT
Start: 2022-05-03 | End: 2022-05-03 | Stop reason: HOSPADM

## 2022-05-03 RX ORDER — METHYLPREDNISOLONE ACETATE 40 MG/ML
INJECTION, SUSPENSION INTRA-ARTICULAR; INTRALESIONAL; INTRAMUSCULAR; SOFT TISSUE
Status: DISCONTINUED | OUTPATIENT
Start: 2022-05-03 | End: 2022-05-03 | Stop reason: HOSPADM

## 2022-05-03 NOTE — DISCHARGE SUMMARY
[facility]  Discharge Note  Short Stay     Admit Date: 5/3/2022    Discharge Date: 5/3/2022     Attending Physician: Fernando Cabezas Jr, MD    Diagnoses:  There are no hospital problems to display for this patient.    Discharged Condition: Good     Hospital Course: Patient was admitted for an outpatient interventional pain management procedure and tolerated the procedure well with no complications.     Final Diagnoses: Same as principal problem.     Disposition: Home or Self Care     Follow up/Patient Instructions:        Reconciled Medications:     Medication List      CONTINUE taking these medications    buPROPion 150 MG TB24 tablet  Commonly known as: WELLBUTRIN XL  TAKE 1 TABLET(150 MG) BY MOUTH EVERY DAY     busPIRone 15 MG tablet  Commonly known as: BUSPAR  TAKE 1 TABLET(15 MG) BY MOUTH TWICE DAILY     ferrous gluconate 324 MG tablet  Commonly known as: FERGON  Take 1 tablet (324 mg total) by mouth daily with breakfast.     FREESTYLE AMANDEEP 14 DAY SENSOR Kit  Generic drug: flash glucose sensor  1 each by Misc.(Non-Drug; Combo Route) route every 14 (fourteen) days.     gabapentin 400 MG capsule  Commonly known as: NEURONTIN  TAKE 1 CAPSULE(400 MG) BY MOUTH TWICE DAILY     losartan 100 MG tablet  Commonly known as: COZAAR  TAKE 1 TABLET(100 MG) BY MOUTH EVERY DAY     mesalamine 0.375 gram Cp24  Commonly known as: APRISO  TAKE 4 CAPSULES(1.5 GRAMS) BY MOUTH EVERY DAY     metFORMIN 500 MG ER 24hr tablet  Commonly known as: GLUCOPHAGE-XR  Take 2 tablets (1,000 mg total) by mouth 2 (two) times daily with meals.     pravastatin 40 MG tablet  Commonly known as: PRAVACHOL  TAKE 1 TABLET(40 MG) BY MOUTH EVERY DAY     sildenafiL 50 MG tablet  Commonly known as: VIAGRA  TAKE ONE TABLET BY MOUTH DAILY AS NEEDED           Discharge Procedure Orders (must include Diet, Follow-up, Activity)   Diet Adult Regular     No driving until:   Order Comments: If you received sedation, no driving for 12 hrs     Remove  dressing in 24 hours     Notify your health care provider if you experience any of the following:  temperature >100.4     Notify your health care provider if you experience any of the following:  severe uncontrolled pain     Notify your health care provider if you experience any of the following:  redness, tenderness, or signs of infection (pain, swelling, redness, odor or green/yellow discharge around incision site)     Notify your health care provider if you experience any of the following:  difficulty breathing or increased cough     Notify your health care provider if you experience any of the following:  severe persistent headache     Notify your health care provider if you experience any of the following:  increased confusion or weakness     Activity as tolerated       Fernando Cabezas Jr, MD  Interventional Pain Medicine / Anesthesiology

## 2022-05-03 NOTE — PLAN OF CARE
Safety precautions maintained. Bed locked and in lowest position. Instructed pt to call for assistance. Pt verbalizes understanding. Patient's blood sugar was 217. Dr. Cabezas notified. Spoke to patient about the importance of monitoring blood sugar at home. Patient agreed and acknowledged understanding. Ok to continue with procedure per Dr. Cabezas.

## 2022-05-03 NOTE — OP NOTE
"Procedure Note    Pre-operative Diagnosis: Lumbar Radiculopathy  Post-operative Diagnosis: Lumbar Radiculopathy  Procedure Date: 05/03/2022  Procedure:  (1) Lumbar Epidural Steroid Injection at L5-S1    (2) Intraoperative fluoroscopy          Anesthesia: Local    Indications: To alleviate pain and suffering, and reduce functional impairment.    Procedure in Detail:   The patients history and physical exam were reviewed. The risks, benefits and alternatives to the procedure were discussed, and all questions were answered to the patients satisfaction. The patient agreed to proceed, and written informed consent was verified.    The patient was brought into the procedure room and placed in the prone position on the fluoroscopy table. The area of the lumbar spine was prepped with Chloraprep and draped in a sterile manner. The targeted interspace was identified and marked under AP fluoroscopy. The skin and subcutaneous tissues overlying the targeted interspace were anesthetized with 3-5 mL of 1% lidocaine using a 25G, 1.5" needle. A 20G, 3.5" Tuohy epidural needle was directed toward the interspace under fluoroscopic guidance until the ligamentum flavum was engaged. From this point, a loss of resistance technique with a glass syringe and saline was used to identify entrance of the needle into the epidural space. Once loss of resistance was observed, up to 1 mL of contrast solution was injected. An appropriate epidurogram was noted.    A 5 mL mixture consisting of PF Lidocaine 1% (4 mL) and Depomedrol 80 mg (1 mL) was injected slowly and without resistance.  The needle was removed and a bandage applied to puncture site.    Blood Loss: nil    Disposition: The patient tolerated the procedure well, and there were no apparent complications. Vital signs remained stable throughout the procedure. The patient was taken to the recovery area where written discharge instructions for the procedure were given.     Follow-up: RTC as " scheduled      Fernando Cabezas Jr, MD  Interventional Pain Medicine / Anesthesiology

## 2022-05-03 NOTE — PROGRESS NOTES
Patient due for the following    Colorectal Cancer Screening     Eye Exam       Immunizations: reviewed and updated  Care Everywhere: triggered  Care Teams: up to date  Outreach: completed

## 2022-05-04 LAB — POCT GLUCOSE: 217 MG/DL (ref 70–110)

## 2022-05-06 ENCOUNTER — TELEPHONE (OUTPATIENT)
Dept: GASTROENTEROLOGY | Facility: CLINIC | Age: 52
End: 2022-05-06
Payer: COMMERCIAL

## 2022-05-06 ENCOUNTER — PATIENT MESSAGE (OUTPATIENT)
Dept: GASTROENTEROLOGY | Facility: CLINIC | Age: 52
End: 2022-05-06
Payer: COMMERCIAL

## 2022-05-06 NOTE — TELEPHONE ENCOUNTER
"----- Message from Edmund Marie MD sent at 5/1/2022  4:26 PM CDT -----  David - please tell patient that their Hepatitis A, B and C labs are negative but they have "No" immunity to   Hepatitis A or hepatitis- C.    He has immunity to hepatitis B which is a good thing.      There is currently No vaccination yet for Hepatitis C.    There is vaccinations for Hepatitis A a,  and Recommend the Hepatitis A vaccination series.     Hepatitis A vaccination series is a 2 part vaccine series - Day 1, and then again in 6-months from first one.    Orders were  placed.    David - please tell patient that they are iron deficient but anemic and recommend that they take ferrous gluconate one 324mg pill once daily for next 3 months.    Please order repeat fasting BRIGITTE, Hemoglobin, Iron/TIBC, and Ferritin in 8 weeks - Orders placed.     Jl you had a very nonspecific slightly positive BRIGITTE which is nonspecific he was a very low level will repeat that in 8 weeks     "

## 2022-05-06 NOTE — TELEPHONE ENCOUNTER
"Contact patient per Dr. Marie, please tell patient that their Hepatitis A, B and C labs are negative but they have "No" immunity to   Hepatitis A or hepatitis- C.     He has immunity to hepatitis B which is a good thing.        There is currently No vaccination yet for Hepatitis C.     There is vaccinations for Hepatitis A a,  and Recommend the Hepatitis A vaccination series.     Hepatitis A vaccination series is a 2 part vaccine series - Day 1, and then again in 6-months from first one.     Orders were  placed.     David - please tell patient that they are iron deficient but anemic and recommend that they take ferrous gluconate one 324mg pill once daily for next 3 months.     Please order repeat fasting BRIGITTE, Hemoglobin, Iron/TIBC, and Ferritin in 8 weeks - Orders placed.     Jl you had a very nonspecific slightly positive BRIGITTE which is nonspecific he was a very low level will repeat that in 8 weeks     Patient has received and read provider message.   Appointment generated for patient.   A reminder letter will be mailed to patient home.   "

## 2022-05-09 ENCOUNTER — HOSPITAL ENCOUNTER (OUTPATIENT)
Dept: RADIOLOGY | Facility: HOSPITAL | Age: 52
Discharge: HOME | End: 2022-05-09
Attending: NURSE PRACTITIONER
Payer: COMMERCIAL

## 2022-05-09 DIAGNOSIS — R17 ELEVATED BILIRUBIN: ICD-10-CM

## 2022-05-09 DIAGNOSIS — R74.8 ELEVATED LIVER ENZYMES: ICD-10-CM

## 2022-05-09 PROCEDURE — 76700 US EXAM ABDOM COMPLETE: CPT | Mod: 26,,, | Performed by: STUDENT IN AN ORGANIZED HEALTH CARE EDUCATION/TRAINING PROGRAM

## 2022-05-09 PROCEDURE — 76700 US ABDOMEN COMPLETE: ICD-10-PCS | Mod: 26,,, | Performed by: STUDENT IN AN ORGANIZED HEALTH CARE EDUCATION/TRAINING PROGRAM

## 2022-05-09 PROCEDURE — 76700 US EXAM ABDOM COMPLETE: CPT | Mod: TC

## 2022-05-13 ENCOUNTER — PATIENT MESSAGE (OUTPATIENT)
Dept: HEPATOLOGY | Facility: CLINIC | Age: 52
End: 2022-05-13
Payer: COMMERCIAL

## 2022-05-13 DIAGNOSIS — R17 ELEVATED BILIRUBIN: ICD-10-CM

## 2022-05-13 DIAGNOSIS — E80.4 GILBERT'S DISEASE: ICD-10-CM

## 2022-05-16 ENCOUNTER — PATIENT OUTREACH (OUTPATIENT)
Dept: ADMINISTRATIVE | Facility: OTHER | Age: 52
End: 2022-05-16
Payer: COMMERCIAL

## 2022-05-16 DIAGNOSIS — Z12.11 ENCOUNTER FOR FIT (FECAL IMMUNOCHEMICAL TEST) SCREENING: Primary | ICD-10-CM

## 2022-05-16 NOTE — PROGRESS NOTES
Health Maintenance Due   Topic Date Due    Colorectal Cancer Screening  08/22/2021    Eye Exam  09/29/2021     Updates were requested from care everywhere.  Chart was reviewed for overdue Proactive [facility] Encounters (PHYLLIS) topics (CRS, Breast Cancer Screening, Eye exam)  Health Maintenance has been updated.  LINKS immunization registry triggered.  Immunizations were reconciled.  Order placed for diabetic eye screening photo.

## 2022-05-21 ENCOUNTER — PATIENT MESSAGE (OUTPATIENT)
Dept: ORTHOPEDICS | Facility: CLINIC | Age: 52
End: 2022-05-21
Payer: COMMERCIAL

## 2022-05-23 ENCOUNTER — OFFICE VISIT (OUTPATIENT)
Dept: PSYCHIATRY | Facility: CLINIC | Age: 52
End: 2022-05-23
Payer: COMMERCIAL

## 2022-05-23 DIAGNOSIS — F41.1 GAD (GENERALIZED ANXIETY DISORDER): Primary | ICD-10-CM

## 2022-05-23 PROCEDURE — 4010F ACE/ARB THERAPY RXD/TAKEN: CPT | Mod: CPTII,S$GLB,, | Performed by: SOCIAL WORKER

## 2022-05-23 PROCEDURE — 3051F HG A1C>EQUAL 7.0%<8.0%: CPT | Mod: CPTII,S$GLB,, | Performed by: SOCIAL WORKER

## 2022-05-23 PROCEDURE — 90834 PSYTX W PT 45 MINUTES: CPT | Mod: S$GLB,,, | Performed by: SOCIAL WORKER

## 2022-05-23 PROCEDURE — 4010F PR ACE/ARB THEARPY RXD/TAKEN: ICD-10-PCS | Mod: CPTII,S$GLB,, | Performed by: SOCIAL WORKER

## 2022-05-23 PROCEDURE — 3051F PR MOST RECENT HEMOGLOBIN A1C LEVEL 7.0 - < 8.0%: ICD-10-PCS | Mod: CPTII,S$GLB,, | Performed by: SOCIAL WORKER

## 2022-05-23 PROCEDURE — 90834 PR PSYCHOTHERAPY W/PATIENT, 45 MIN: ICD-10-PCS | Mod: S$GLB,,, | Performed by: SOCIAL WORKER

## 2022-05-23 NOTE — PROGRESS NOTES
Individual Psychotherapy (PhD/LCSW)    5/23/2022    Site:  [facility]         Therapeutic Intervention: Met with patient.  Outpatient - Insight oriented psychotherapy 45 min - CPT code 96279    Chief complaint/reason for encounter: anxiety     Interval history and content of current session: Pt seen in office. He talks about anxiety surrounding his wife and problems at her job and concerns for his 14 year old son who is just like him.  Discussed how pt takes everything seriously and imagines the worst possible outcome.  Discussed meditation and anxiety management strategies.    Treatment plan:  · Target symptoms: anxiety   · Why chosen therapy is appropriate versus another modality: relevant to diagnosis  · Outcome monitoring methods: self-report, observation  · Therapeutic intervention type: insight oriented psychotherapy    Risk parameters:  Patient reports no suicidal ideation  Patient reports no homicidal ideation  Patient reports no self-injurious behavior  Patient reports no violent behavior    Verbal deficits: None    Patient's response to intervention:  The patient's response to intervention is motivated.    Progress toward goals and other mental status changes:  The patient's progress toward goals is fair .    Diagnosis:     ICD-10-CM ICD-9-CM   1. MYAH (generalized anxiety disorder)  F41.1 300.02       Plan:  individual psychotherapy and medication management by physician    Return to clinic: as scheduled    Length of Service (minutes): 45

## 2022-05-30 ENCOUNTER — PATIENT MESSAGE (OUTPATIENT)
Dept: ADMINISTRATIVE | Facility: HOSPITAL | Age: 52
End: 2022-05-30
Payer: COMMERCIAL

## 2022-06-17 ENCOUNTER — PATIENT MESSAGE (OUTPATIENT)
Dept: GASTROENTEROLOGY | Facility: CLINIC | Age: 52
End: 2022-06-17
Payer: COMMERCIAL

## 2022-06-17 DIAGNOSIS — K51.90 ULCERATIVE COLITIS WITHOUT COMPLICATIONS, UNSPECIFIED LOCATION: ICD-10-CM

## 2022-06-18 ENCOUNTER — TELEPHONE (OUTPATIENT)
Dept: GASTROENTEROLOGY | Facility: CLINIC | Age: 52
End: 2022-06-18
Payer: COMMERCIAL

## 2022-06-18 RX ORDER — MESALAMINE 0.38 G/1
1.5 CAPSULE, EXTENDED RELEASE ORAL DAILY
Qty: 360 CAPSULE | Refills: 1 | Status: SHIPPED | OUTPATIENT
Start: 2022-06-18 | End: 2022-12-20

## 2022-06-18 RX ORDER — MESALAMINE 0.38 G/1
CAPSULE, EXTENDED RELEASE ORAL
Qty: 360 CAPSULE | Refills: 0 | OUTPATIENT
Start: 2022-06-18

## 2022-06-30 ENCOUNTER — OFFICE VISIT (OUTPATIENT)
Dept: PRIMARY CARE CLINIC | Facility: CLINIC | Age: 52
End: 2022-06-30
Payer: COMMERCIAL

## 2022-06-30 ENCOUNTER — LAB VISIT (OUTPATIENT)
Dept: LAB | Facility: HOSPITAL | Age: 52
End: 2022-06-30
Attending: INTERNAL MEDICINE
Payer: COMMERCIAL

## 2022-06-30 VITALS
SYSTOLIC BLOOD PRESSURE: 146 MMHG | BODY MASS INDEX: 39.02 KG/M2 | TEMPERATURE: 98 F | HEIGHT: 69 IN | HEART RATE: 91 BPM | WEIGHT: 263.44 LBS | DIASTOLIC BLOOD PRESSURE: 100 MMHG | OXYGEN SATURATION: 99 %

## 2022-06-30 DIAGNOSIS — E78.5 HYPERLIPIDEMIA, UNSPECIFIED HYPERLIPIDEMIA TYPE: ICD-10-CM

## 2022-06-30 DIAGNOSIS — E11.9 TYPE 2 DIABETES MELLITUS WITHOUT COMPLICATION, WITHOUT LONG-TERM CURRENT USE OF INSULIN: Primary | ICD-10-CM

## 2022-06-30 DIAGNOSIS — K51.90 ULCERATIVE COLITIS WITHOUT COMPLICATIONS, UNSPECIFIED LOCATION: ICD-10-CM

## 2022-06-30 DIAGNOSIS — E66.01 SEVERE OBESITY (BMI 35.0-39.9) WITH COMORBIDITY: ICD-10-CM

## 2022-06-30 DIAGNOSIS — F33.1 MODERATE EPISODE OF RECURRENT MAJOR DEPRESSIVE DISORDER: ICD-10-CM

## 2022-06-30 DIAGNOSIS — I10 ESSENTIAL HYPERTENSION: ICD-10-CM

## 2022-06-30 DIAGNOSIS — Z12.11 COLON CANCER SCREENING: ICD-10-CM

## 2022-06-30 DIAGNOSIS — E11.9 TYPE 2 DIABETES MELLITUS WITHOUT COMPLICATION, WITHOUT LONG-TERM CURRENT USE OF INSULIN: ICD-10-CM

## 2022-06-30 LAB
ALBUMIN/CREAT UR: 18.1 UG/MG (ref 0–30)
ANION GAP SERPL CALC-SCNC: 10 MMOL/L (ref 8–16)
BUN SERPL-MCNC: 15 MG/DL (ref 6–20)
CALCIUM SERPL-MCNC: 9.9 MG/DL (ref 8.7–10.5)
CHLORIDE SERPL-SCNC: 101 MMOL/L (ref 95–110)
CO2 SERPL-SCNC: 22 MMOL/L (ref 23–29)
CREAT SERPL-MCNC: 1.1 MG/DL (ref 0.5–1.4)
CREAT UR-MCNC: 221 MG/DL (ref 23–375)
EST. GFR  (AFRICAN AMERICAN): >60 ML/MIN/1.73 M^2
EST. GFR  (NON AFRICAN AMERICAN): >60 ML/MIN/1.73 M^2
ESTIMATED AVG GLUCOSE: 232 MG/DL (ref 68–131)
GLUCOSE SERPL-MCNC: 246 MG/DL (ref 70–110)
HBA1C MFR BLD: 9.7 % (ref 4–5.6)
MICROALBUMIN UR DL<=1MG/L-MCNC: 40 UG/ML
POTASSIUM SERPL-SCNC: 4.4 MMOL/L (ref 3.5–5.1)
SODIUM SERPL-SCNC: 133 MMOL/L (ref 136–145)

## 2022-06-30 PROCEDURE — 3077F SYST BP >= 140 MM HG: CPT | Mod: CPTII,S$GLB,, | Performed by: INTERNAL MEDICINE

## 2022-06-30 PROCEDURE — 1160F RVW MEDS BY RX/DR IN RCRD: CPT | Mod: CPTII,S$GLB,, | Performed by: INTERNAL MEDICINE

## 2022-06-30 PROCEDURE — 80048 BASIC METABOLIC PNL TOTAL CA: CPT | Performed by: INTERNAL MEDICINE

## 2022-06-30 PROCEDURE — 3061F NEG MICROALBUMINURIA REV: CPT | Mod: CPTII,S$GLB,, | Performed by: INTERNAL MEDICINE

## 2022-06-30 PROCEDURE — 3008F BODY MASS INDEX DOCD: CPT | Mod: CPTII,S$GLB,, | Performed by: INTERNAL MEDICINE

## 2022-06-30 PROCEDURE — 3008F PR BODY MASS INDEX (BMI) DOCUMENTED: ICD-10-PCS | Mod: CPTII,S$GLB,, | Performed by: INTERNAL MEDICINE

## 2022-06-30 PROCEDURE — 82570 ASSAY OF URINE CREATININE: CPT | Performed by: INTERNAL MEDICINE

## 2022-06-30 PROCEDURE — 3046F PR MOST RECENT HEMOGLOBIN A1C LEVEL > 9.0%: ICD-10-PCS | Mod: CPTII,S$GLB,, | Performed by: INTERNAL MEDICINE

## 2022-06-30 PROCEDURE — 3080F DIAST BP >= 90 MM HG: CPT | Mod: CPTII,S$GLB,, | Performed by: INTERNAL MEDICINE

## 2022-06-30 PROCEDURE — 1160F PR REVIEW ALL MEDS BY PRESCRIBER/CLIN PHARMACIST DOCUMENTED: ICD-10-PCS | Mod: CPTII,S$GLB,, | Performed by: INTERNAL MEDICINE

## 2022-06-30 PROCEDURE — 3046F HEMOGLOBIN A1C LEVEL >9.0%: CPT | Mod: CPTII,S$GLB,, | Performed by: INTERNAL MEDICINE

## 2022-06-30 PROCEDURE — 36415 COLL VENOUS BLD VENIPUNCTURE: CPT | Mod: PN | Performed by: INTERNAL MEDICINE

## 2022-06-30 PROCEDURE — 83036 HEMOGLOBIN GLYCOSYLATED A1C: CPT | Performed by: INTERNAL MEDICINE

## 2022-06-30 PROCEDURE — 3080F PR MOST RECENT DIASTOLIC BLOOD PRESSURE >= 90 MM HG: ICD-10-PCS | Mod: CPTII,S$GLB,, | Performed by: INTERNAL MEDICINE

## 2022-06-30 PROCEDURE — 99214 PR OFFICE/OUTPT VISIT, EST, LEVL IV, 30-39 MIN: ICD-10-PCS | Mod: S$GLB,,, | Performed by: INTERNAL MEDICINE

## 2022-06-30 PROCEDURE — 1159F MED LIST DOCD IN RCRD: CPT | Mod: CPTII,S$GLB,, | Performed by: INTERNAL MEDICINE

## 2022-06-30 PROCEDURE — 3077F PR MOST RECENT SYSTOLIC BLOOD PRESSURE >= 140 MM HG: ICD-10-PCS | Mod: CPTII,S$GLB,, | Performed by: INTERNAL MEDICINE

## 2022-06-30 PROCEDURE — 1159F PR MEDICATION LIST DOCUMENTED IN MEDICAL RECORD: ICD-10-PCS | Mod: CPTII,S$GLB,, | Performed by: INTERNAL MEDICINE

## 2022-06-30 PROCEDURE — 99999 PR PBB SHADOW E&M-EST. PATIENT-LVL IV: ICD-10-PCS | Mod: PBBFAC,,, | Performed by: INTERNAL MEDICINE

## 2022-06-30 PROCEDURE — 99214 OFFICE O/P EST MOD 30 MIN: CPT | Mod: S$GLB,,, | Performed by: INTERNAL MEDICINE

## 2022-06-30 PROCEDURE — 99999 PR PBB SHADOW E&M-EST. PATIENT-LVL IV: CPT | Mod: PBBFAC,,, | Performed by: INTERNAL MEDICINE

## 2022-06-30 PROCEDURE — 3066F NEPHROPATHY DOC TX: CPT | Mod: CPTII,S$GLB,, | Performed by: INTERNAL MEDICINE

## 2022-06-30 PROCEDURE — 4010F ACE/ARB THERAPY RXD/TAKEN: CPT | Mod: CPTII,S$GLB,, | Performed by: INTERNAL MEDICINE

## 2022-06-30 PROCEDURE — 3061F PR NEG MICROALBUMINURIA RESULT DOCUMENTED/REVIEW: ICD-10-PCS | Mod: CPTII,S$GLB,, | Performed by: INTERNAL MEDICINE

## 2022-06-30 PROCEDURE — 3066F PR DOCUMENTATION OF TREATMENT FOR NEPHROPATHY: ICD-10-PCS | Mod: CPTII,S$GLB,, | Performed by: INTERNAL MEDICINE

## 2022-06-30 PROCEDURE — 4010F PR ACE/ARB THEARPY RXD/TAKEN: ICD-10-PCS | Mod: CPTII,S$GLB,, | Performed by: INTERNAL MEDICINE

## 2022-06-30 RX ORDER — BUPROPION HYDROCHLORIDE 300 MG/1
300 TABLET ORAL DAILY
Qty: 90 TABLET | Refills: 1 | Status: SHIPPED | OUTPATIENT
Start: 2022-06-30 | End: 2022-12-29

## 2022-06-30 RX ORDER — METFORMIN HYDROCHLORIDE 500 MG/1
TABLET, EXTENDED RELEASE ORAL
Qty: 360 TABLET | Refills: 0 | Status: SHIPPED | OUTPATIENT
Start: 2022-06-30 | End: 2022-07-10 | Stop reason: SDUPTHER

## 2022-06-30 RX ORDER — VALSARTAN 160 MG/1
160 TABLET ORAL DAILY
Qty: 90 TABLET | Refills: 0 | Status: SHIPPED | OUTPATIENT
Start: 2022-06-30 | End: 2022-09-21

## 2022-06-30 NOTE — PROGRESS NOTES
Subjective:       Patient ID: Jl Araiza is a 52 y.o. male.    Chief Complaint: Follow-up    Last seen 10 months ago for annual physical with uncontrolled diabetes. Had labs three months ago showing improvement, late in returning for f/u chronic medical conditions. Admits he is still terrified of going out due to COVID, despite 4 doses of the vaccine. Has been very depressed lately, largely associated with work-related stress. Home BP ranges 127-132/90-92 per history, checked 2-3 times a week. Home Glucose?  Compliant with daily meds as prescribed, Apriso has been ordered by GI.     PMH:   Hypertension.   Diabetes type 2, HbA1c 7.7% (from 13) March '22.  Hyperlipidemia.  March '22.  Ulcerative Colitis, last seen by GI 6/19.  Iron Def Anemia  Allergic Rhinitis  Depression/Anxiety, last seen by Psychiatry 12/17.  Obesity.   Eye exam 9/20. Podiatry 12/18. EGD 6/16. Colonoscopy 8/19. BMD normal 6/17. PSA 0.84 July '20.    PSH: Umbilical Hernia Repair.      Social: Non-smoker, rare alcohol.  with a son. , now working at [employer].     Hep B vaccine 2015, Prevnar 4/15. Pneumovax 11/15. Tdap 11/15. Flu shot 10/20. Shingrix 11/20, 1/21. COVID (Pfizer) x 4.     NKDA.    Medications: list reviewed and reconciled.      Review of Systems   Constitutional: Negative for activity change, fever and unexpected weight change.   HENT: Negative for hearing loss, rhinorrhea and trouble swallowing.    Eyes: Negative for discharge and visual disturbance.   Respiratory: Negative for cough, chest tightness, shortness of breath and wheezing.    Cardiovascular: Negative for chest pain, palpitations and leg swelling.   Gastrointestinal: Negative for blood in stool, constipation, diarrhea and vomiting.   Endocrine: Negative for polydipsia and polyuria.   Genitourinary: Negative for difficulty urinating, hematuria and urgency.   Musculoskeletal: Negative for joint swelling and neck pain.        Persistent right  "knee pain after injury 2 years ago.   Neurological: Negative for weakness, numbness and headaches.   Psychiatric/Behavioral: Positive for dysphoric mood. Negative for confusion and suicidal ideas. The patient is nervous/anxious.          Objective:    /100, Pulse 90, Temp 98.3, O2 Sat 99%, Ht 5' 9", Wt 263 lbs (from 243), BMI=39  Physical Exam  Constitutional:       General: He is not in acute distress.  Cardiovascular:      Rate and Rhythm: Normal rate and regular rhythm.      Pulses: Normal pulses.   Pulmonary:      Effort: Pulmonary effort is normal. No respiratory distress.      Breath sounds: Normal breath sounds. No wheezing or rales.   Musculoskeletal:         General: Normal range of motion.      Right lower leg: No edema.      Left lower leg: No edema.   Skin:     General: Skin is warm and dry.   Neurological:      Mental Status: He is alert.   Psychiatric:         Mood and Affect: Mood is anxious and depressed.         Speech: Speech normal.         Behavior: Behavior is agitated.         Thought Content: Thought content normal.         Assessment:       Problem List Items Addressed This Visit     Type 2 diabetes mellitus without complication, without long-term current use of insulin - Primary    Relevant Orders    Basic Metabolic Panel    Hemoglobin A1C    Microalbumin/Creatinine Ratio, Urine    Essential hypertension    Ulcerative colitis    Severe obesity (BMI 35.0-39.9) with comorbidity    Hyperlipidemia    Recurrent major depressive disorder      Other Visit Diagnoses     Colon cancer screening              Plan:       Type 2 diabetes mellitus without complication, without long-term current use of insulin  -     Basic Metabolic Panel; Future; Expected date: 06/30/2022  -     Hemoglobin A1C; Future; Expected date: 06/30/2022  -     Microalbumin/Creatinine Ratio, Urine  -     Med order upon review of results.     Essential hypertension not controlled  -     Change Losartan to Valsartan (DIOVAN) " 160 MG tablet; Take 1 tablet (160 mg total) by mouth once daily. For Blood Pressure.  Dispense: 90 tablet; Refill: 0    Hyperlipidemia, unspecified hyperlipidemia type        -     LDL near goal, continue Pravastatin the same.     Severe obesity (BMI 35.0-39.9) with comorbidity        -    Encouraged diet and exercise to address all of above.     Ulcerative colitis without complications, unspecified location        -     Stable on Apriso, f/u with GI.     Moderate episode of recurrent major depressive disorder  -     Increase BuPROPion (WELLBUTRIN XL) 300 MG 24 hr tablet; Take 1 tablet (300 mg total) by mouth once daily.  Dispense: 90 tablet; Refill: 1    Colon cancer screening        -     Due for Colonoscopy - discuss with GI.

## 2022-06-30 NOTE — TELEPHONE ENCOUNTER
No new care gaps identified.  [facility] Embedded Care Gaps. Reference number: [id number]. 6/30/2022   5:56:25 PM CDT

## 2022-07-01 ENCOUNTER — CLINICAL SUPPORT (OUTPATIENT)
Dept: INFECTIOUS DISEASES | Facility: CLINIC | Age: 52
End: 2022-07-01
Payer: COMMERCIAL

## 2022-07-01 ENCOUNTER — LAB VISIT (OUTPATIENT)
Dept: LAB | Facility: HOSPITAL | Age: 52
End: 2022-07-01
Attending: INTERNAL MEDICINE
Payer: COMMERCIAL

## 2022-07-01 DIAGNOSIS — R76.8 ANA POSITIVE: ICD-10-CM

## 2022-07-01 DIAGNOSIS — Z23 ENCOUNTER FOR VACCINATION: ICD-10-CM

## 2022-07-01 LAB
FERRITIN SERPL-MCNC: 168 NG/ML (ref 20–300)
HGB BLD-MCNC: 15.9 G/DL (ref 14–18)
IRON SERPL-MCNC: 67 UG/DL (ref 45–160)
SATURATED IRON: 17 % (ref 20–50)
TOTAL IRON BINDING CAPACITY: 395 UG/DL (ref 250–450)
TRANSFERRIN SERPL-MCNC: 267 MG/DL (ref 200–375)

## 2022-07-01 PROCEDURE — 86038 ANTINUCLEAR ANTIBODIES: CPT | Performed by: INTERNAL MEDICINE

## 2022-07-01 PROCEDURE — 90471 HEPATITIS A VACCINE ADULT IM: ICD-10-PCS | Mod: S$GLB,,, | Performed by: INTERNAL MEDICINE

## 2022-07-01 PROCEDURE — 90632 HEPA VACCINE ADULT IM: CPT | Mod: S$GLB,,, | Performed by: INTERNAL MEDICINE

## 2022-07-01 PROCEDURE — 85018 HEMOGLOBIN: CPT | Performed by: INTERNAL MEDICINE

## 2022-07-01 PROCEDURE — 36415 COLL VENOUS BLD VENIPUNCTURE: CPT | Performed by: INTERNAL MEDICINE

## 2022-07-01 PROCEDURE — 82728 ASSAY OF FERRITIN: CPT | Performed by: INTERNAL MEDICINE

## 2022-07-01 PROCEDURE — 90632 HEPATITIS A VACCINE ADULT IM: ICD-10-PCS | Mod: S$GLB,,, | Performed by: INTERNAL MEDICINE

## 2022-07-01 PROCEDURE — 90471 IMMUNIZATION ADMIN: CPT | Mod: S$GLB,,, | Performed by: INTERNAL MEDICINE

## 2022-07-01 PROCEDURE — 99999 PR PBB SHADOW E&M-EST. PATIENT-LVL II: ICD-10-PCS | Mod: PBBFAC,,,

## 2022-07-01 PROCEDURE — 84466 ASSAY OF TRANSFERRIN: CPT | Performed by: INTERNAL MEDICINE

## 2022-07-01 PROCEDURE — 99999 PR PBB SHADOW E&M-EST. PATIENT-LVL II: CPT | Mod: PBBFAC,,,

## 2022-07-01 NOTE — PROGRESS NOTES
Patient received Hep A #1 vaccine in the left deltoid. Pt tolerated well. Pt asked to wait in the clinic 15 minutes after injection in the event of an allergic reaction. Pt verbalized understanding. Pt left in NAD.

## 2022-07-01 NOTE — TELEPHONE ENCOUNTER
Refill Decision Note   Jl Araiza  is requesting a refill authorization.  Brief Assessment and Rationale for Refill:  Approve     Medication Therapy Plan:       Medication Reconciliation Completed: No   Comments:     No Care Gaps recommended.     Note composed:8:14 PM 06/30/2022

## 2022-07-04 RX ORDER — PRAVASTATIN SODIUM 40 MG/1
40 TABLET ORAL DAILY
Qty: 90 TABLET | Refills: 1 | Status: SHIPPED | OUTPATIENT
Start: 2022-07-04 | End: 2022-09-24

## 2022-07-05 LAB — ANA SER QL IF: NORMAL

## 2022-07-09 DIAGNOSIS — R76.8 ANA POSITIVE: ICD-10-CM

## 2022-07-09 DIAGNOSIS — D50.9 IRON DEFICIENCY ANEMIA, UNSPECIFIED IRON DEFICIENCY ANEMIA TYPE: Primary | ICD-10-CM

## 2022-07-09 DIAGNOSIS — Z23 ENCOUNTER FOR VACCINATION: ICD-10-CM

## 2022-07-09 DIAGNOSIS — E61.1 IRON DEFICIENCY: ICD-10-CM

## 2022-07-09 DIAGNOSIS — K52.9 IBD (INFLAMMATORY BOWEL DISEASE): ICD-10-CM

## 2022-07-09 RX ORDER — FERROUS GLUCONATE 324(38)MG
324 TABLET ORAL
Qty: 90 TABLET | Refills: 1 | Status: SHIPPED | OUTPATIENT
Start: 2022-07-09 | End: 2023-01-05

## 2022-07-09 NOTE — PROGRESS NOTES
David - please tell patient that they are iron deficient but not anemic and recommend that they take ferrous gluconate one 324mg pill once daily for next 3 months.    Please order repeat fasting Hemoglobin, Iron/TIBC, and Ferritin in 8 weeks - Orders placed.    Recommend EGD colonoscopy for further evaluation orders placed

## 2022-07-10 ENCOUNTER — PATIENT MESSAGE (OUTPATIENT)
Dept: PRIMARY CARE CLINIC | Facility: CLINIC | Age: 52
End: 2022-07-10
Payer: COMMERCIAL

## 2022-07-10 RX ORDER — METFORMIN HYDROCHLORIDE 500 MG/1
1000 TABLET, EXTENDED RELEASE ORAL 2 TIMES DAILY WITH MEALS
Qty: 360 TABLET | Refills: 1 | Status: SHIPPED | OUTPATIENT
Start: 2022-07-10 | End: 2023-04-11 | Stop reason: ALTCHOICE

## 2022-07-14 ENCOUNTER — PATIENT MESSAGE (OUTPATIENT)
Dept: GASTROENTEROLOGY | Facility: CLINIC | Age: 52
End: 2022-07-14
Payer: COMMERCIAL

## 2022-07-26 DIAGNOSIS — F41.1 GAD (GENERALIZED ANXIETY DISORDER): ICD-10-CM

## 2022-07-26 DIAGNOSIS — F33.1 MODERATE EPISODE OF RECURRENT MAJOR DEPRESSIVE DISORDER: ICD-10-CM

## 2022-07-26 NOTE — TELEPHONE ENCOUNTER
No new care gaps identified.  [facility] Embedded Care Gaps. Reference number: [id number]. 7/26/2022   9:20:41 AM VEENAT

## 2022-07-27 RX ORDER — BUPROPION HYDROCHLORIDE 150 MG/1
TABLET ORAL
Qty: 30 TABLET | Refills: 2 | OUTPATIENT
Start: 2022-07-27

## 2022-07-27 RX ORDER — BUSPIRONE HYDROCHLORIDE 15 MG/1
TABLET ORAL
Qty: 60 TABLET | Refills: 8 | Status: SHIPPED | OUTPATIENT
Start: 2022-07-27 | End: 2023-04-11 | Stop reason: SDUPTHER

## 2022-07-27 NOTE — TELEPHONE ENCOUNTER
Refill Routing Note   Medication(s) are not appropriate for processing by [facility] for the following reason(s):      - Outside of protocol    [facility] action(s):  Route  Quick Discontinue       Medication Therapy Plan: ROUTE buspirone (non-delegated); QDC bupropion xl 150 (pt now on 300mg)  Medication reconciliation completed: No     Appointments  past 12m or future 3m with PCP    Date Provider   Last Visit   6/30/2022 Corine Taylor MD   Next Visit   Visit date not found Corine Taylor MD   ED visits in past 90 days: 0        Note composed:10:38 AM 07/27/2022

## 2022-08-09 ENCOUNTER — OFFICE VISIT (OUTPATIENT)
Dept: OPTOMETRY | Facility: CLINIC | Age: 52
End: 2022-08-09
Payer: COMMERCIAL

## 2022-08-09 DIAGNOSIS — H52.223 REGULAR ASTIGMATISM OF BOTH EYES: ICD-10-CM

## 2022-08-09 DIAGNOSIS — Z13.5 SCREENING FOR EYE CONDITION: ICD-10-CM

## 2022-08-09 DIAGNOSIS — Z01.00 DIABETIC EYE EXAM: Primary | ICD-10-CM

## 2022-08-09 DIAGNOSIS — E11.9 DIABETIC EYE EXAM: Primary | ICD-10-CM

## 2022-08-09 DIAGNOSIS — E11.9 TYPE 2 DIABETES MELLITUS WITHOUT RETINOPATHY: ICD-10-CM

## 2022-08-09 DIAGNOSIS — H52.4 PRESBYOPIA OF BOTH EYES: ICD-10-CM

## 2022-08-09 PROCEDURE — 3066F NEPHROPATHY DOC TX: CPT | Mod: CPTII,S$GLB,, | Performed by: OPTOMETRIST

## 2022-08-09 PROCEDURE — 1159F PR MEDICATION LIST DOCUMENTED IN MEDICAL RECORD: ICD-10-PCS | Mod: CPTII,S$GLB,, | Performed by: OPTOMETRIST

## 2022-08-09 PROCEDURE — 99999 PR PBB SHADOW E&M-EST. PATIENT-LVL II: CPT | Mod: PBBFAC,,, | Performed by: OPTOMETRIST

## 2022-08-09 PROCEDURE — 3061F PR NEG MICROALBUMINURIA RESULT DOCUMENTED/REVIEW: ICD-10-PCS | Mod: CPTII,S$GLB,, | Performed by: OPTOMETRIST

## 2022-08-09 PROCEDURE — 92014 COMPRE OPH EXAM EST PT 1/>: CPT | Mod: S$GLB,,, | Performed by: OPTOMETRIST

## 2022-08-09 PROCEDURE — 92015 PR REFRACTION: ICD-10-PCS | Mod: S$GLB,,, | Performed by: OPTOMETRIST

## 2022-08-09 PROCEDURE — 99999 PR PBB SHADOW E&M-EST. PATIENT-LVL II: ICD-10-PCS | Mod: PBBFAC,,, | Performed by: OPTOMETRIST

## 2022-08-09 PROCEDURE — 3066F PR DOCUMENTATION OF TREATMENT FOR NEPHROPATHY: ICD-10-PCS | Mod: CPTII,S$GLB,, | Performed by: OPTOMETRIST

## 2022-08-09 PROCEDURE — 2023F DILAT RTA XM W/O RTNOPTHY: CPT | Mod: CPTII,S$GLB,, | Performed by: OPTOMETRIST

## 2022-08-09 PROCEDURE — 3046F HEMOGLOBIN A1C LEVEL >9.0%: CPT | Mod: CPTII,S$GLB,, | Performed by: OPTOMETRIST

## 2022-08-09 PROCEDURE — 1159F MED LIST DOCD IN RCRD: CPT | Mod: CPTII,S$GLB,, | Performed by: OPTOMETRIST

## 2022-08-09 PROCEDURE — 2023F PR DILATED RETINAL EXAM W/O EVID OF RETINOPATHY: ICD-10-PCS | Mod: CPTII,S$GLB,, | Performed by: OPTOMETRIST

## 2022-08-09 PROCEDURE — 92015 DETERMINE REFRACTIVE STATE: CPT | Mod: S$GLB,,, | Performed by: OPTOMETRIST

## 2022-08-09 PROCEDURE — 4010F ACE/ARB THERAPY RXD/TAKEN: CPT | Mod: CPTII,S$GLB,, | Performed by: OPTOMETRIST

## 2022-08-09 PROCEDURE — 92014 PR EYE EXAM, EST PATIENT,COMPREHESV: ICD-10-PCS | Mod: S$GLB,,, | Performed by: OPTOMETRIST

## 2022-08-09 PROCEDURE — 3061F NEG MICROALBUMINURIA REV: CPT | Mod: CPTII,S$GLB,, | Performed by: OPTOMETRIST

## 2022-08-09 PROCEDURE — 4010F PR ACE/ARB THEARPY RXD/TAKEN: ICD-10-PCS | Mod: CPTII,S$GLB,, | Performed by: OPTOMETRIST

## 2022-08-09 PROCEDURE — 3046F PR MOST RECENT HEMOGLOBIN A1C LEVEL > 9.0%: ICD-10-PCS | Mod: CPTII,S$GLB,, | Performed by: OPTOMETRIST

## 2022-08-09 NOTE — PATIENT INSTRUCTIONS
Good ocular health in both eyes.  Type 2 (currently insulin-dependant) type 2 diabetes.   No evidence of diabetic retinal changes in either eye.     Astigmatic refractive error in each eye, with good best - corrected visual acuity in each eye.  Presbyopia consistent with age.  New spectacle lens Rx issued for use as desired.   Recommend full-time wear.  No compelling need to replace lenses at this time, if happy with [facility] with present glasses.   Repeat general eye examination and refraction in one year.                 
Detail Level: Zone
Modify Regimen: Betamethasone cream 1-2 xs a week to the vaginal area.

## 2022-08-09 NOTE — PROGRESS NOTES
HPI     Diabetic Eye Exam     Comments: Overdue annual diabetic eye exam and refraction.  Wears glasses full-time.  No acute ocular/vision problems               Comments     Patient's age: 52 y.o. WM   Approximate date of last eye examination:  09/29/2020  Name of last eye doctor seen:  Dr Fine   City/State:  [facility]   Wears glasses? Yes      wears  full time   Present glasses are bifocal / S V Distance / S V Reading: Progressive lens     Approximate age of present glasses: 2- years   Got new glasses following last exam, or subsequently?: yes (!)   Any problem with [facility] with glasses?  No problems   Wears CLs?:  No   Headaches? Not eye related - stress and work-related.   Eye pain/discomfort?  Eyes feel tired in AM - reports sleep deprivation                                                                                        Flashes?  No   Floaters?  no   Diplopia/Double vision?  No   Patient's Ocular History:          Any eye surgeries? No          Any eye injury?  No          Any treatment for eye disease?  No   Family history of eye disease?  No   Significant patient medical history:         1. Diabetes?  Yes x 5 years     If yes, IDDM or NIDDM?  Metformin and Ozempic  2. HBP?  Yes, controlled with medication               3. Other (describe):  None    ! OTC eyedrops currently using:  No    ! Prescription eye meds currently using:  No    ! Any history of allergy/adverse reaction to any eye meds used   previously?  No    ! Any history of allergy/adverse reaction to eyedrops used during prior   eye exam(s)? No    ! Any history of allergy/adverse reaction to Novacaine or similar meds?   No    ! Any history of allergy/adverse reaction to Epinephrine or similar meds?   No    ! Patient okay with use of anesthetic eyedrops to check eye pressure?    Yes        ! Patient okay with use of eyedrops to dilate pupils today?  Yes    !  Allergies/Medications/Medical History/Family History reviewed today?    Yes       PD =    "72/68   Desired reading distance =  16"   Computer distance = 23"                         Last edited by Noman Fine, OD on 8/9/2022  7:59 AM. (History)            Assessment /Plan     For exam results, see Encounter Report.    1. Diabetic eye exam     2. Type 2 diabetes mellitus without retinopathy     3. Regular astigmatism of both eyes     4. Presbyopia of both eyes     5. Screening for eye condition                  Good ocular health in both eyes.  Type 2 (currently insulin-dependant) type 2 diabetes.   No evidence of diabetic retinal changes in either eye.     Astigmatic refractive error in each eye, with good best - corrected visual acuity in each eye.  Presbyopia consistent with age.  New spectacle lens Rx issued for use as desired.   Recommend full-time wear.  No compelling need to replace lenses at this time, if happy with [facility] with present glasses.   Repeat general eye examination and refraction in one year.                   "

## 2022-09-07 ENCOUNTER — PATIENT OUTREACH (OUTPATIENT)
Dept: ADMINISTRATIVE | Facility: HOSPITAL | Age: 52
End: 2022-09-07
Payer: COMMERCIAL

## 2022-09-07 NOTE — PROGRESS NOTES
Health Maintenance Due   Topic Date Due    Colorectal Cancer Screening  08/22/2021    Foot Exam  08/09/2022    Influenza Vaccine (1) 09/01/2022

## 2022-09-22 ENCOUNTER — PATIENT MESSAGE (OUTPATIENT)
Dept: PRIMARY CARE CLINIC | Facility: CLINIC | Age: 52
End: 2022-09-22
Payer: COMMERCIAL

## 2023-01-04 NOTE — PROGRESS NOTES
David please refer Jl to hepatology Clinic for evaluation of persistently elevated liver enzymes referrals placed    Please tell Jl I would like to order in 3 weeks 12 hour fasting blood work orders placed What Type Of Note Output Would You Prefer (Optional)?: Bullet Format Hpi Title: Evaluation of Skin Lesions How Severe Are Your Spot(S)?: moderate Have Your Spot(S) Been Treated In The Past?: has not been treated Additional History: Declines fbe.\\n\\nPt is accomplice by an aid staff member to this appointment. Pt is a little un easy about the appointment, may yell.

## 2023-01-05 ENCOUNTER — CLINICAL SUPPORT (OUTPATIENT)
Dept: INFECTIOUS DISEASES | Facility: CLINIC | Age: 53
End: 2023-01-05
Payer: COMMERCIAL

## 2023-01-05 DIAGNOSIS — R76.8 ANA POSITIVE: ICD-10-CM

## 2023-01-05 DIAGNOSIS — Z23 ENCOUNTER FOR VACCINATION: ICD-10-CM

## 2023-01-05 PROCEDURE — 90471 HEPATITIS A VACCINE ADULT IM: ICD-10-PCS | Mod: S$GLB,,, | Performed by: INTERNAL MEDICINE

## 2023-01-05 PROCEDURE — 99999 PR PBB SHADOW E&M-EST. PATIENT-LVL I: CPT | Mod: PBBFAC,,,

## 2023-01-05 PROCEDURE — 99999 PR PBB SHADOW E&M-EST. PATIENT-LVL I: ICD-10-PCS | Mod: PBBFAC,,,

## 2023-01-05 PROCEDURE — 90632 HEPA VACCINE ADULT IM: CPT | Mod: S$GLB,,, | Performed by: INTERNAL MEDICINE

## 2023-01-05 PROCEDURE — 90632 HEPATITIS A VACCINE ADULT IM: ICD-10-PCS | Mod: S$GLB,,, | Performed by: INTERNAL MEDICINE

## 2023-01-05 PROCEDURE — 90471 IMMUNIZATION ADMIN: CPT | Mod: S$GLB,,, | Performed by: INTERNAL MEDICINE

## 2023-01-18 ENCOUNTER — TELEPHONE (OUTPATIENT)
Dept: PRIMARY CARE CLINIC | Facility: CLINIC | Age: 53
End: 2023-01-18
Payer: COMMERCIAL

## 2023-01-18 NOTE — TELEPHONE ENCOUNTER
I spoke to Akash valenzuela with [employer] he state  the RX was transferred from [facility] the refill is no longer needed from the doctor

## 2023-01-18 NOTE — TELEPHONE ENCOUNTER
----- Message from Nataliia Townsend sent at 1/17/2023  8:32 AM CST -----  Contact: [email or web address] [phone]  Pharmacy is calling to clarify an RX.  RX name:  sildenafiL (VIAGRA) 50 MG tablet  What do they need to clarify:    Comments:  [email or web address] is calling to get above rx filled    [facility] - [place], [place] - [address]   Phone:  [phone]

## 2023-02-25 DIAGNOSIS — K52.9 IBD (INFLAMMATORY BOWEL DISEASE): Primary | ICD-10-CM

## 2023-03-09 ENCOUNTER — TELEPHONE (OUTPATIENT)
Dept: PRIMARY CARE CLINIC | Facility: CLINIC | Age: 53
End: 2023-03-09
Payer: COMMERCIAL

## 2023-03-09 ENCOUNTER — PATIENT OUTREACH (OUTPATIENT)
Dept: ADMINISTRATIVE | Facility: HOSPITAL | Age: 53
End: 2023-03-09
Payer: COMMERCIAL

## 2023-03-09 ENCOUNTER — PATIENT MESSAGE (OUTPATIENT)
Dept: ADMINISTRATIVE | Facility: HOSPITAL | Age: 53
End: 2023-03-09
Payer: COMMERCIAL

## 2023-03-09 VITALS — DIASTOLIC BLOOD PRESSURE: 77 MMHG | SYSTOLIC BLOOD PRESSURE: 112 MMHG

## 2023-03-10 ENCOUNTER — DOCUMENTATION ONLY (OUTPATIENT)
Dept: PRIMARY CARE CLINIC | Facility: CLINIC | Age: 53
End: 2023-03-10
Payer: COMMERCIAL

## 2023-03-10 VITALS — SYSTOLIC BLOOD PRESSURE: 119 MMHG | DIASTOLIC BLOOD PRESSURE: 78 MMHG

## 2023-03-29 DIAGNOSIS — E11.9 TYPE 2 DIABETES MELLITUS WITHOUT COMPLICATION: ICD-10-CM

## 2023-03-29 RX ORDER — METFORMIN HYDROCHLORIDE 500 MG/1
TABLET, EXTENDED RELEASE ORAL
Qty: 360 TABLET | Refills: 1 | OUTPATIENT
Start: 2023-03-29

## 2023-03-29 NOTE — TELEPHONE ENCOUNTER
Refill Routing Note   Medication(s) are not appropriate for processing by [facility] for the following reason(s):      Required labs outdated    [facility] action(s):  Defer Appointment due  Labs due            Appointments  past 12m or future 3m with PCP    Date Provider   Last Visit   6/30/2022 Corine Taylor MD   Next Visit   Visit date not found Corine Taylor MD   ED visits in past 90 days: 0        Note composed:9:41 AM 03/29/2023

## 2023-03-29 NOTE — TELEPHONE ENCOUNTER
Care Due:                  Date            Visit Type   Department     Provider  --------------------------------------------------------------------------------                                MYCHART                              FOLLOWUP/OF  [facility] PRIMARY   Corine Aguilera  Last Visit: 06-      FICE VISIT   CARE           Madeline  Next Visit: None Scheduled  None         None Found                                                            Last  Test          Frequency    Reason                     Performed    Due Date  --------------------------------------------------------------------------------    Office Visit  12 months..  buPROPion, metFORMIN,      06- 06-                             pravastatin, semaglutide,                             sildenafiL, valsartan....    CMP.........  12 months..  metFORMIN, pravastatin,    03- 05-                             valsartan................    HBA1C.......  6 months...  metFORMIN, semaglutide...  06- 12-    Lipid Panel.  12 months..  pravastatin..............  03- 03-    [facility] Embedded Care Gaps. Reference number: [id number]. 3/29/2023   5:26:13 AM CDT

## 2023-04-04 ENCOUNTER — LAB VISIT (OUTPATIENT)
Dept: LAB | Facility: HOSPITAL | Age: 53
End: 2023-04-04
Attending: INTERNAL MEDICINE
Payer: COMMERCIAL

## 2023-04-04 ENCOUNTER — PATIENT OUTREACH (OUTPATIENT)
Dept: ADMINISTRATIVE | Facility: HOSPITAL | Age: 53
End: 2023-04-04
Payer: COMMERCIAL

## 2023-04-04 DIAGNOSIS — E11.9 TYPE 2 DIABETES MELLITUS WITHOUT COMPLICATION: ICD-10-CM

## 2023-04-04 LAB
CHOLEST SERPL-MCNC: 219 MG/DL (ref 120–199)
CHOLEST/HDLC SERPL: 4.8 {RATIO} (ref 2–5)
ESTIMATED AVG GLUCOSE: 131 MG/DL (ref 68–131)
GLUCOSE SERPL-MCNC: 131 MG/DL (ref 70–110)
HBA1C MFR BLD: 6.2 % (ref 4–5.6)
HDLC SERPL-MCNC: 46 MG/DL (ref 40–75)
HDLC SERPL: 21 % (ref 20–50)
LDLC SERPL CALC-MCNC: 143.8 MG/DL (ref 63–159)
NONHDLC SERPL-MCNC: 173 MG/DL
TRIGL SERPL-MCNC: 146 MG/DL (ref 30–150)

## 2023-04-04 PROCEDURE — 80061 LIPID PANEL: CPT | Performed by: INTERNAL MEDICINE

## 2023-04-04 PROCEDURE — 36415 COLL VENOUS BLD VENIPUNCTURE: CPT | Performed by: INTERNAL MEDICINE

## 2023-04-04 PROCEDURE — 82947 ASSAY GLUCOSE BLOOD QUANT: CPT | Performed by: INTERNAL MEDICINE

## 2023-04-04 PROCEDURE — 83036 HEMOGLOBIN GLYCOSYLATED A1C: CPT | Performed by: INTERNAL MEDICINE

## 2023-04-04 NOTE — PROGRESS NOTES
Health Maintenance Due   Topic Date Due    Colorectal Cancer Screening  08/22/2021    Foot Exam  08/09/2022        Chart review done.   HM updated.   Immunizations reviewed & updated.   Care Everywhere updated.

## 2023-04-08 DIAGNOSIS — I10 ESSENTIAL HYPERTENSION: ICD-10-CM

## 2023-04-08 DIAGNOSIS — F33.1 MODERATE EPISODE OF RECURRENT MAJOR DEPRESSIVE DISORDER: ICD-10-CM

## 2023-04-08 DIAGNOSIS — F41.1 GAD (GENERALIZED ANXIETY DISORDER): ICD-10-CM

## 2023-04-08 NOTE — TELEPHONE ENCOUNTER
No new care gaps identified.  [facility] Embedded Care Gaps. Reference number: [id number]. 4/08/2023   12:59:48 PM CDT

## 2023-04-09 RX ORDER — BUPROPION HYDROCHLORIDE 300 MG/1
TABLET ORAL
Qty: 90 TABLET | Refills: 0 | Status: SHIPPED | OUTPATIENT
Start: 2023-04-09 | End: 2023-04-11 | Stop reason: SDUPTHER

## 2023-04-09 RX ORDER — VALSARTAN 160 MG/1
TABLET ORAL
Qty: 90 TABLET | Refills: 0 | Status: SHIPPED | OUTPATIENT
Start: 2023-04-09 | End: 2023-04-11 | Stop reason: SDUPTHER

## 2023-04-09 NOTE — TELEPHONE ENCOUNTER
Refill Routing Note   Medication(s) are not appropriate for processing by [facility] for the following reason(s):      Medication outside of protocol    [facility] action(s):  Route  Approve            Appointments  past 12m or future 3m with PCP    Date Provider   Last Visit   6/30/2022 Corine Taylor MD   Next Visit   4/11/2023 Corine Taylor MD   ED visits in past 90 days: 0        Note composed:1:46 PM 04/09/2023

## 2023-04-11 ENCOUNTER — OFFICE VISIT (OUTPATIENT)
Dept: PRIMARY CARE CLINIC | Facility: CLINIC | Age: 53
End: 2023-04-11
Payer: COMMERCIAL

## 2023-04-11 VITALS — SYSTOLIC BLOOD PRESSURE: 115 MMHG | DIASTOLIC BLOOD PRESSURE: 78 MMHG

## 2023-04-11 DIAGNOSIS — K51.90 ULCERATIVE COLITIS WITHOUT COMPLICATIONS, UNSPECIFIED LOCATION: ICD-10-CM

## 2023-04-11 DIAGNOSIS — E78.5 HYPERLIPIDEMIA, UNSPECIFIED HYPERLIPIDEMIA TYPE: ICD-10-CM

## 2023-04-11 DIAGNOSIS — E66.01 SEVERE OBESITY (BMI 35.0-39.9) WITH COMORBIDITY: ICD-10-CM

## 2023-04-11 DIAGNOSIS — F41.1 GAD (GENERALIZED ANXIETY DISORDER): ICD-10-CM

## 2023-04-11 DIAGNOSIS — F33.1 MODERATE EPISODE OF RECURRENT MAJOR DEPRESSIVE DISORDER: ICD-10-CM

## 2023-04-11 DIAGNOSIS — I10 ESSENTIAL HYPERTENSION: ICD-10-CM

## 2023-04-11 DIAGNOSIS — E11.9 TYPE 2 DIABETES MELLITUS WITHOUT COMPLICATION, WITHOUT LONG-TERM CURRENT USE OF INSULIN: Primary | ICD-10-CM

## 2023-04-11 PROCEDURE — 3072F LOW RISK FOR RETINOPATHY: CPT | Mod: CPTII,95,, | Performed by: INTERNAL MEDICINE

## 2023-04-11 PROCEDURE — 3044F PR MOST RECENT HEMOGLOBIN A1C LEVEL <7.0%: ICD-10-PCS | Mod: CPTII,95,, | Performed by: INTERNAL MEDICINE

## 2023-04-11 PROCEDURE — 4010F ACE/ARB THERAPY RXD/TAKEN: CPT | Mod: CPTII,95,, | Performed by: INTERNAL MEDICINE

## 2023-04-11 PROCEDURE — 3074F SYST BP LT 130 MM HG: CPT | Mod: CPTII,95,, | Performed by: INTERNAL MEDICINE

## 2023-04-11 PROCEDURE — 4010F PR ACE/ARB THEARPY RXD/TAKEN: ICD-10-PCS | Mod: CPTII,95,, | Performed by: INTERNAL MEDICINE

## 2023-04-11 PROCEDURE — 99214 PR OFFICE/OUTPT VISIT, EST, LEVL IV, 30-39 MIN: ICD-10-PCS | Mod: 95,,, | Performed by: INTERNAL MEDICINE

## 2023-04-11 PROCEDURE — 3078F DIAST BP <80 MM HG: CPT | Mod: CPTII,95,, | Performed by: INTERNAL MEDICINE

## 2023-04-11 PROCEDURE — 3072F PR LOW RISK FOR RETINOPATHY: ICD-10-PCS | Mod: CPTII,95,, | Performed by: INTERNAL MEDICINE

## 2023-04-11 PROCEDURE — 1160F PR REVIEW ALL MEDS BY PRESCRIBER/CLIN PHARMACIST DOCUMENTED: ICD-10-PCS | Mod: CPTII,95,, | Performed by: INTERNAL MEDICINE

## 2023-04-11 PROCEDURE — 1159F MED LIST DOCD IN RCRD: CPT | Mod: CPTII,95,, | Performed by: INTERNAL MEDICINE

## 2023-04-11 PROCEDURE — 1159F PR MEDICATION LIST DOCUMENTED IN MEDICAL RECORD: ICD-10-PCS | Mod: CPTII,95,, | Performed by: INTERNAL MEDICINE

## 2023-04-11 PROCEDURE — 1160F RVW MEDS BY RX/DR IN RCRD: CPT | Mod: CPTII,95,, | Performed by: INTERNAL MEDICINE

## 2023-04-11 PROCEDURE — 99214 OFFICE O/P EST MOD 30 MIN: CPT | Mod: 95,,, | Performed by: INTERNAL MEDICINE

## 2023-04-11 PROCEDURE — 3078F PR MOST RECENT DIASTOLIC BLOOD PRESSURE < 80 MM HG: ICD-10-PCS | Mod: CPTII,95,, | Performed by: INTERNAL MEDICINE

## 2023-04-11 PROCEDURE — 3074F PR MOST RECENT SYSTOLIC BLOOD PRESSURE < 130 MM HG: ICD-10-PCS | Mod: CPTII,95,, | Performed by: INTERNAL MEDICINE

## 2023-04-11 PROCEDURE — 3044F HG A1C LEVEL LT 7.0%: CPT | Mod: CPTII,95,, | Performed by: INTERNAL MEDICINE

## 2023-04-11 RX ORDER — BUPROPION HYDROCHLORIDE 300 MG/1
300 TABLET ORAL DAILY
Qty: 90 TABLET | Refills: 1 | Status: SHIPPED | OUTPATIENT
Start: 2023-04-11 | End: 2023-07-19 | Stop reason: SDUPTHER

## 2023-04-11 RX ORDER — SEMAGLUTIDE 0.68 MG/ML
0.5 INJECTION, SOLUTION SUBCUTANEOUS
Qty: 3 ML | Refills: 3 | Status: SHIPPED | OUTPATIENT
Start: 2023-04-11 | End: 2023-07-19 | Stop reason: SDUPTHER

## 2023-04-11 RX ORDER — BUSPIRONE HYDROCHLORIDE 15 MG/1
15 TABLET ORAL 2 TIMES DAILY
Qty: 180 TABLET | Refills: 2 | Status: SHIPPED | OUTPATIENT
Start: 2023-04-11

## 2023-04-11 RX ORDER — BUSPIRONE HYDROCHLORIDE 15 MG/1
TABLET ORAL
Qty: 60 TABLET | Refills: 8 | OUTPATIENT
Start: 2023-04-11

## 2023-04-11 RX ORDER — VALSARTAN 160 MG/1
160 TABLET ORAL DAILY
Qty: 90 TABLET | Refills: 1 | Status: SHIPPED | OUTPATIENT
Start: 2023-04-11 | End: 2023-07-19 | Stop reason: SDUPTHER

## 2023-04-11 RX ORDER — PRAVASTATIN SODIUM 40 MG/1
40 TABLET ORAL DAILY
Qty: 90 TABLET | Refills: 2 | Status: SHIPPED | OUTPATIENT
Start: 2023-04-11 | End: 2024-01-03

## 2023-04-11 NOTE — PROGRESS NOTES
Subjective     Patient ID: Jl Araiza is a 53 y.o. male.    Chief Complaint: Diabetes and Hypertension    The patient location is: Home in [place]  The chief complaint leading to consultation is: Diabetes and Hypertension.    Visit type: audiovisual    Face to Face time with patient: 23 minutes  30 minutes of total time spent on the encounter, which includes face to face time and non-face to face time preparing to see the patient (eg, review of tests), Obtaining and/or reviewing separately obtained history, Documenting clinical information in the electronic or other health record, Independently interpreting results (not separately reported) and communicating results to the patient/family/caregiver, or Care coordination (not separately reported).     Each patient to whom he or she provides medical services by telemedicine is:  (1) informed of the relationship between the physician and patient and the respective role of any other health care provider with respect to management of the patient; and (2) notified that he or she may decline to receive medical services by telemedicine and may withdraw from such care at any time.    Notes:   Last seen 10 months ago with diabetes uncontrolled - Ozempic added to Metformin. Returns for follow up much later than planned. He took Ozempic for a couple of months then stopped it for no particular reason. Then recently realized Metformin was passing through and coming out whole and un-dissolved in his stool. So he decreased to two tabs once daily, but wants to stop it completely. Will resume Ozempic. He has made significant changes in diet and activity level - exercising 90 minutes a day, walking 4-5 miles. Has lost close to 20 pounds. Glucose is staying under 150. Blood pressure is running 110-120's systolic. He feels great.     PMH:   Hypertension.   Diabetes type 2, HbA1c 9.7% June '22.  Hyperlipidemia.  March '22.  Ulcerative Colitis, last seen by GI 6/19.  Iron  Def Anemia  Allergic Rhinitis  Depression/Anxiety, last seen by Psychiatry 12/17.  Obesity.   Eye exam 9/20. Podiatry 12/18. EGD 6/16. Colonoscopy 8/19. PSA 1.2 July '21. BMD normal 6/17. Vaccines reviewed.    PSH: Umbilical Hernia Repair.      Social: Non-smoker, rare alcohol.  with a son. , now working at [employer].     NKDA.    Medications: list reviewed and reconciled.      Review of Systems   Constitutional:  Positive for activity change. Negative for fever and unexpected weight change.   HENT:  Negative for hearing loss, rhinorrhea and trouble swallowing.    Eyes:  Negative for discharge and visual disturbance.   Respiratory:  Negative for cough, chest tightness, shortness of breath and wheezing.    Cardiovascular:  Negative for chest pain and palpitations.   Gastrointestinal:  Negative for blood in stool, constipation, diarrhea and vomiting.   Endocrine: Negative for polydipsia and polyuria.   Genitourinary:  Negative for difficulty urinating, hematuria and urgency.   Musculoskeletal:  Negative for arthralgias, joint swelling and neck pain.   Neurological:  Negative for weakness and headaches.   Psychiatric/Behavioral:  Negative for confusion and dysphoric mood.         Stable on Wellbutrin and Buspar.        Objective   /78, Pulse 75, Wt 245.6 lbs at home today.  Physical Exam  Constitutional:       General: He is not in acute distress.     Appearance: He is not ill-appearing.   Pulmonary:      Effort: Pulmonary effort is normal. No respiratory distress.   Neurological:      Mental Status: He is alert.   Psychiatric:         Mood and Affect: Mood normal.         Behavior: Behavior normal.         Thought Content: Thought content normal.         Judgment: Judgment normal.     Lab Visit on 04/04/2023   Component Date Value    Glucose 04/04/2023 131 (H)     Hemoglobin A1C 04/04/2023 6.2 (H)     Estimated Avg Glucose 04/04/2023 131     Cholesterol 04/04/2023 219 (H)     Triglycerides  04/04/2023 146     HDL 04/04/2023 46     LDL Cholesterol 04/04/2023 143.8     HDL/Cholesterol Ratio 04/04/2023 21.0     Total Cholesterol/HDL Ra* 04/04/2023 4.8     Non-HDL Cholesterol 04/04/2023 173         Assessment and Plan     Problem List Items Addressed This Visit       Type 2 diabetes mellitus without complication, without long-term current use of insulin - Primary    Relevant Medications    semaglutide (OZEMPIC) 0.25 mg or 0.5 mg(2 mg/1.5 mL) pen injector    Essential hypertension    Relevant Medications    valsartan (DIOVAN) 160 MG tablet    Ulcerative colitis    Severe obesity (BMI 35.0-39.9) with comorbidity    MYAH (generalized anxiety disorder)    Relevant Medications    busPIRone (BUSPAR) 15 MG tablet    Hyperlipidemia    Relevant Medications    pravastatin (PRAVACHOL) 40 MG tablet    Recurrent major depressive disorder    Relevant Medications    buPROPion (WELLBUTRIN XL) 300 MG 24 hr tablet       Type 2 diabetes mellitus without complication, without long-term current use of insulin  -     semaglutide (OZEMPIC) 0.25 mg or 0.5 mg(2 mg/1.5 mL) pen injector; Inject 0.5 mg into the skin every 7 days.  Dispense: 1.5 mL; Refill: 3    Essential hypertension  -     valsartan (DIOVAN) 160 MG tablet; Take 1 tablet (160 mg total) by mouth once daily.  Dispense: 90 tablet; Refill: 1    Hyperlipidemia, unspecified hyperlipidemia type  -     pravastatin (PRAVACHOL) 40 MG tablet; Take 1 tablet (40 mg total) by mouth once daily.  Dispense: 90 tablet; Refill: 2    Severe obesity (BMI 35.0-39.9) with comorbidity        -     continue diet and exercise.     Ulcerative colitis without complications, unspecified location        -     continue Apriso.    Moderate episode of recurrent major depressive disorder  -     buPROPion (WELLBUTRIN XL) 300 MG 24 hr tablet; Take 1 tablet (300 mg total) by mouth once daily.  Dispense: 90 tablet; Refill: 1    MYAH (generalized anxiety disorder)  -     busPIRone (BUSPAR) 15 MG tablet;  Take 1 tablet (15 mg total) by mouth 2 (two) times daily.  Dispense: 180 tablet; Refill: 2

## 2023-04-14 ENCOUNTER — DOCUMENTATION ONLY (OUTPATIENT)
Dept: PHARMACY | Facility: CLINIC | Age: 53
End: 2023-04-14
Payer: COMMERCIAL

## 2023-04-17 ENCOUNTER — PATIENT MESSAGE (OUTPATIENT)
Dept: GASTROENTEROLOGY | Facility: CLINIC | Age: 53
End: 2023-04-17
Payer: COMMERCIAL

## 2023-04-17 ENCOUNTER — TELEPHONE (OUTPATIENT)
Dept: GASTROENTEROLOGY | Facility: CLINIC | Age: 53
End: 2023-04-17
Payer: COMMERCIAL

## 2023-04-17 DIAGNOSIS — K51.90 ULCERATIVE COLITIS WITHOUT COMPLICATIONS, UNSPECIFIED LOCATION: ICD-10-CM

## 2023-04-17 DIAGNOSIS — Z79.899 ENCOUNTER FOR LONG-TERM CURRENT USE OF HIGH RISK MEDICATION: Primary | ICD-10-CM

## 2023-04-17 RX ORDER — MESALAMINE 0.38 G/1
1.5 CAPSULE, EXTENDED RELEASE ORAL DAILY
Qty: 360 CAPSULE | Refills: 1 | Status: SHIPPED | OUTPATIENT
Start: 2023-04-17 | End: 2024-01-12 | Stop reason: SDUPTHER

## 2023-04-25 ENCOUNTER — PATIENT MESSAGE (OUTPATIENT)
Dept: PRIMARY CARE CLINIC | Facility: CLINIC | Age: 53
End: 2023-04-25
Payer: COMMERCIAL

## 2023-05-16 ENCOUNTER — TELEPHONE (OUTPATIENT)
Dept: ENDOSCOPY | Facility: HOSPITAL | Age: 53
End: 2023-05-16
Payer: COMMERCIAL

## 2023-05-16 ENCOUNTER — PATIENT MESSAGE (OUTPATIENT)
Dept: ENDOSCOPY | Facility: HOSPITAL | Age: 53
End: 2023-05-16
Payer: COMMERCIAL

## 2023-05-16 NOTE — TELEPHONE ENCOUNTER
Contacted patient to schedule colonoscopy.  No answer. LVM for patient to call the endoscopy scheduling department at [phone] to schedule procedure.

## 2023-05-16 NOTE — TELEPHONE ENCOUNTER
"Edmund Marie MD  [facility]  Caller: Unspecified (3 weeks ago)  Procedure: EGD/Colonoscopy     Diagnosis: Diagnoses   Iron deficiency anemia, unspecified iron deficiency anemia type [D50.9]   IBD (inflammatory bowel disease) [K52.9]   Iron deficiency [E61.1]     Procedure Timin-12 weeks     *If within 4 weeks selected, please toño as high priority*     *If greater than 12 weeks, please select "5-12 weeks" and delay sending until 2 months prior to requested date*     Provider: Myself     Location: [address]     Additional Scheduling Information:  Ozempic     Prep Specifications:Extended/Constipation prep     Have you attached a patient to this message: yes   "

## 2023-05-25 ENCOUNTER — PATIENT MESSAGE (OUTPATIENT)
Dept: GASTROENTEROLOGY | Facility: CLINIC | Age: 53
End: 2023-05-25
Payer: COMMERCIAL

## 2023-05-25 ENCOUNTER — TELEPHONE (OUTPATIENT)
Dept: ENDOSCOPY | Facility: HOSPITAL | Age: 53
End: 2023-05-25
Payer: COMMERCIAL

## 2023-05-25 NOTE — TELEPHONE ENCOUNTER
Just FYI    Multiple attempts have been made to contact patient to schedule colonoscopy. Patient did not schedule. Per protocol no further attempts to contact patient will be made regarding this request.     Thanks,    Martha

## 2023-05-25 NOTE — TELEPHONE ENCOUNTER
3rd attempt to contact patient to schedule EGD and colonoscopy. No answer. LVM on all contact number for patient to call the endoscopy scheduling department at [phone] tos schedule procedures.

## 2023-05-25 NOTE — TELEPHONE ENCOUNTER
"Edmund Marie MD  [facility] Endoscopist [facility] Patients  Caller: Unspecified (1 month ago)  Procedure: EGD/Colonoscopy     Diagnosis: Diagnoses   Iron deficiency anemia, unspecified iron deficiency anemia type [D50.9]   IBD (inflammatory bowel disease) [K52.9]   Iron deficiency [E61.1]     Procedure Timin-12 weeks     *If within 4 weeks selected, please toño as high priority*     *If greater than 12 weeks, please select "5-12 weeks" and delay sending until 2 months prior to requested date*     Provider: Myself     Location: [address]     Additional Scheduling Information:  Ozempic     Prep Specifications:Extended/Constipation prep     Have you attached a patient to this message: yes   "

## 2023-07-12 ENCOUNTER — PATIENT MESSAGE (OUTPATIENT)
Dept: PRIMARY CARE CLINIC | Facility: CLINIC | Age: 53
End: 2023-07-12
Payer: COMMERCIAL

## 2023-07-18 ENCOUNTER — OFFICE VISIT (OUTPATIENT)
Dept: PRIMARY CARE CLINIC | Facility: CLINIC | Age: 53
End: 2023-07-18
Payer: COMMERCIAL

## 2023-07-18 VITALS
SYSTOLIC BLOOD PRESSURE: 116 MMHG | TEMPERATURE: 98 F | DIASTOLIC BLOOD PRESSURE: 78 MMHG | WEIGHT: 246.88 LBS | BODY MASS INDEX: 36.56 KG/M2 | HEART RATE: 79 BPM | HEIGHT: 69 IN | OXYGEN SATURATION: 98 %

## 2023-07-18 DIAGNOSIS — Z00.00 ROUTINE MEDICAL EXAM: Primary | ICD-10-CM

## 2023-07-18 DIAGNOSIS — F41.1 GAD (GENERALIZED ANXIETY DISORDER): ICD-10-CM

## 2023-07-18 DIAGNOSIS — K51.90 ULCERATIVE COLITIS WITHOUT COMPLICATIONS, UNSPECIFIED LOCATION: ICD-10-CM

## 2023-07-18 DIAGNOSIS — I10 ESSENTIAL HYPERTENSION: ICD-10-CM

## 2023-07-18 DIAGNOSIS — E11.9 TYPE 2 DIABETES MELLITUS WITHOUT COMPLICATION, WITHOUT LONG-TERM CURRENT USE OF INSULIN: ICD-10-CM

## 2023-07-18 DIAGNOSIS — E66.01 SEVERE OBESITY (BMI 35.0-39.9) WITH COMORBIDITY: ICD-10-CM

## 2023-07-18 DIAGNOSIS — E78.5 HYPERLIPIDEMIA, UNSPECIFIED HYPERLIPIDEMIA TYPE: ICD-10-CM

## 2023-07-18 DIAGNOSIS — Z12.5 PROSTATE CANCER SCREENING: ICD-10-CM

## 2023-07-18 LAB
ALBUMIN/CREAT UR: NORMAL UG/MG (ref 0–30)
CREAT UR-MCNC: 133 MG/DL (ref 23–375)
MICROALBUMIN UR DL<=1MG/L-MCNC: <5 UG/ML

## 2023-07-18 PROCEDURE — 1159F PR MEDICATION LIST DOCUMENTED IN MEDICAL RECORD: ICD-10-PCS | Mod: CPTII,S$GLB,, | Performed by: INTERNAL MEDICINE

## 2023-07-18 PROCEDURE — 1159F MED LIST DOCD IN RCRD: CPT | Mod: CPTII,S$GLB,, | Performed by: INTERNAL MEDICINE

## 2023-07-18 PROCEDURE — 3078F DIAST BP <80 MM HG: CPT | Mod: CPTII,S$GLB,, | Performed by: INTERNAL MEDICINE

## 2023-07-18 PROCEDURE — 4010F ACE/ARB THERAPY RXD/TAKEN: CPT | Mod: CPTII,S$GLB,, | Performed by: INTERNAL MEDICINE

## 2023-07-18 PROCEDURE — 3044F HG A1C LEVEL LT 7.0%: CPT | Mod: CPTII,S$GLB,, | Performed by: INTERNAL MEDICINE

## 2023-07-18 PROCEDURE — 3008F PR BODY MASS INDEX (BMI) DOCUMENTED: ICD-10-PCS | Mod: CPTII,S$GLB,, | Performed by: INTERNAL MEDICINE

## 2023-07-18 PROCEDURE — 1160F RVW MEDS BY RX/DR IN RCRD: CPT | Mod: CPTII,S$GLB,, | Performed by: INTERNAL MEDICINE

## 2023-07-18 PROCEDURE — 99999 PR PBB SHADOW E&M-EST. PATIENT-LVL IV: CPT | Mod: PBBFAC,,, | Performed by: INTERNAL MEDICINE

## 2023-07-18 PROCEDURE — 99396 PR PREVENTIVE VISIT,EST,40-64: ICD-10-PCS | Mod: S$GLB,,, | Performed by: INTERNAL MEDICINE

## 2023-07-18 PROCEDURE — 3072F LOW RISK FOR RETINOPATHY: CPT | Mod: CPTII,S$GLB,, | Performed by: INTERNAL MEDICINE

## 2023-07-18 PROCEDURE — 3074F PR MOST RECENT SYSTOLIC BLOOD PRESSURE < 130 MM HG: ICD-10-PCS | Mod: CPTII,S$GLB,, | Performed by: INTERNAL MEDICINE

## 2023-07-18 PROCEDURE — 1160F PR REVIEW ALL MEDS BY PRESCRIBER/CLIN PHARMACIST DOCUMENTED: ICD-10-PCS | Mod: CPTII,S$GLB,, | Performed by: INTERNAL MEDICINE

## 2023-07-18 PROCEDURE — 82570 ASSAY OF URINE CREATININE: CPT | Performed by: INTERNAL MEDICINE

## 2023-07-18 PROCEDURE — 99999 PR PBB SHADOW E&M-EST. PATIENT-LVL IV: ICD-10-PCS | Mod: PBBFAC,,, | Performed by: INTERNAL MEDICINE

## 2023-07-18 PROCEDURE — 3044F PR MOST RECENT HEMOGLOBIN A1C LEVEL <7.0%: ICD-10-PCS | Mod: CPTII,S$GLB,, | Performed by: INTERNAL MEDICINE

## 2023-07-18 PROCEDURE — 99396 PREV VISIT EST AGE 40-64: CPT | Mod: S$GLB,,, | Performed by: INTERNAL MEDICINE

## 2023-07-18 PROCEDURE — 3074F SYST BP LT 130 MM HG: CPT | Mod: CPTII,S$GLB,, | Performed by: INTERNAL MEDICINE

## 2023-07-18 PROCEDURE — 4010F PR ACE/ARB THEARPY RXD/TAKEN: ICD-10-PCS | Mod: CPTII,S$GLB,, | Performed by: INTERNAL MEDICINE

## 2023-07-18 PROCEDURE — 3008F BODY MASS INDEX DOCD: CPT | Mod: CPTII,S$GLB,, | Performed by: INTERNAL MEDICINE

## 2023-07-18 PROCEDURE — 3078F PR MOST RECENT DIASTOLIC BLOOD PRESSURE < 80 MM HG: ICD-10-PCS | Mod: CPTII,S$GLB,, | Performed by: INTERNAL MEDICINE

## 2023-07-18 PROCEDURE — 3072F PR LOW RISK FOR RETINOPATHY: ICD-10-PCS | Mod: CPTII,S$GLB,, | Performed by: INTERNAL MEDICINE

## 2023-07-18 NOTE — PROGRESS NOTES
Subjective     Patient ID: Jl Araiza is a 53 y.o. male.    Chief Complaint: Annual Exam    Last had a virtual visit three months ago, office visit 13 months ago. Returns for annual physical and f/u chronic medical conditions. Has been working diligently on diet and daily exercise - walks three miles. Lost more weight and feeling well. Home BP is consistently normal, has not been checking glucose.     PMH:   Hypertension.   Diabetes type 2, HbA1c 6.2% April '23.  Hyperlipidemia.  April '23.  Ulcerative Colitis, last seen by GI 6/19.  Iron Def Anemia  Allergic Rhinitis  Depression/Anxiety, last seen by Psychiatry 12/17.  Obesity.   Eye exam 8/22. Podiatry 12/18. EGD 6/16. Colonoscopy 8/19. PSA 1.2 July '21. BMD normal 6/17. Vaccines reviewed.    PSH: Umbilical Hernia Repair.      Social: Non-smoker, rare alcohol.  with a son. , now working at [employer].     [employer].    Medications: list reviewed and reconciled.      Review of Systems   Constitutional:  Negative for activity change, appetite change, chills, fatigue, fever and unexpected weight change.   HENT:  Negative for nasal congestion, ear pain, hearing loss, postnasal drip, rhinorrhea, sore throat, trouble swallowing and voice change.    Eyes:  Negative for pain, discharge and visual disturbance.   Respiratory:  Negative for apnea, cough, chest tightness, shortness of breath and wheezing.    Cardiovascular:  Negative for chest pain, palpitations and leg swelling.   Gastrointestinal:  Negative for abdominal pain, blood in stool, constipation, diarrhea, nausea and vomiting.   Endocrine: Negative for polydipsia and polyuria.   Genitourinary:  Negative for difficulty urinating, dysuria, frequency, hematuria, scrotal swelling and urgency.   Musculoskeletal:  Negative for back pain, gait problem, joint swelling, myalgias, neck pain and neck stiffness.        Chronic right knee and right foot pain are stable, he rarely uses oral analgesics.  "  Integumentary:  Negative for color change and rash.   Neurological:  Negative for dizziness, seizures, syncope, weakness, numbness and headaches.   Hematological:  Negative for adenopathy. Does not bruise/bleed easily.   Psychiatric/Behavioral:  Negative for agitation, confusion, dysphoric mood and sleep disturbance. The patient is not nervous/anxious.         Objective   Vitals:    07/18/23 0841   BP: 116/78   Pulse: 79   Temp: 98.2 °F (36.8 °C)   SpO2: 98%   Weight: 112 kg (246 lb 14.4 oz)      From 263 a year ago.    Height: 5' 9" (1.753 m)   BMI=36.5  Physical Exam  Constitutional:       General: He is not in acute distress.     Appearance: Normal appearance. He is well-developed. He is not diaphoretic.   HENT:      Head: Normocephalic and atraumatic.      Right Ear: Tympanic membrane, ear canal and external ear normal.      Left Ear: Tympanic membrane, ear canal and external ear normal.      Nose: Nose normal. No congestion.      Mouth/Throat:      Mouth: Mucous membranes are moist.      Pharynx: Oropharynx is clear.   Eyes:      General: No scleral icterus.     Extraocular Movements: Extraocular movements intact.      Conjunctiva/sclera: Conjunctivae normal.      Pupils: Pupils are equal, round, and reactive to light.   Neck:      Thyroid: No thyromegaly.      Vascular: No carotid bruit or JVD.   Cardiovascular:      Rate and Rhythm: Normal rate and regular rhythm.      Pulses: Normal pulses.      Heart sounds: Normal heart sounds. No murmur heard.    No friction rub. No gallop.   Pulmonary:      Effort: Pulmonary effort is normal. No respiratory distress.      Breath sounds: Normal breath sounds. No wheezing, rhonchi or rales.   Abdominal:      General: Bowel sounds are normal. There is no distension.      Palpations: Abdomen is soft. There is no mass.      Tenderness: There is no abdominal tenderness.      Hernia: No hernia is present.   Musculoskeletal:         General: No tenderness or deformity. Normal " range of motion.      Cervical back: Normal range of motion and neck supple.      Right lower leg: No edema.      Left lower leg: No edema.      Comments: Protective Sensation:  Right: Intact  Left: Intact    Visual Inspection:  Normal -  Bilateral    Pedal Pulses:   Right: Present  Left: Present    Posterior Tibialis Pulses:   Right:Present  Left: Present       Lymphadenopathy:      Cervical: No cervical adenopathy.   Skin:     General: Skin is warm and dry.      Findings: No rash.   Neurological:      General: No focal deficit present.      Mental Status: He is alert and oriented to person, place, and time.      Cranial Nerves: No cranial nerve deficit.      Motor: No weakness or abnormal muscle tone.      Coordination: Coordination normal.      Gait: Gait normal.   Psychiatric:         Mood and Affect: Mood and affect normal.         Speech: Speech normal.         Behavior: Behavior normal.         Thought Content: Thought content normal.         Judgment: Judgment normal.        Assessment and Plan     1. Routine medical exam  -     CBC Auto Differential; Future; Expected date: 07/18/2023  -     Comprehensive Metabolic Panel; Future; Expected date: 07/18/2023  -     Lipid Panel; Future; Expected date: 07/18/2023    2. Type 2 diabetes mellitus without complication, without long-term current use of insulin  -     Hemoglobin A1C; Future; Expected date: 07/18/2023  -     Microalbumin/Creatinine Ratio, Urine  -     continue Ozempic.     3. Essential hypertension controlled, continue Valsartan 160mg daily.    4. Hyperlipidemia, unspecified hyperlipidemia type - labs as above on consistent daily dosing of Pravastatin 40.    5. Severe obesity (BMI 35.0-39.9) with comorbidity - making great progress with diet and exercise!    6. MYAH (generalized anxiety disorder) - stable on Wellbutrin and Buspar.     7. Ulcerative colitis without complications, unspecified location - due for endoscopy, contact Gastroenterology.    8.  Prostate cancer screening  -     PSA, Screening; Future; Expected date: 07/18/2023    Flu shot in the Fall.        Follow up in about 6 months (around 1/18/2024).

## 2023-07-19 ENCOUNTER — PATIENT MESSAGE (OUTPATIENT)
Dept: PRIMARY CARE CLINIC | Facility: CLINIC | Age: 53
End: 2023-07-19
Payer: COMMERCIAL

## 2023-07-19 ENCOUNTER — LAB VISIT (OUTPATIENT)
Dept: LAB | Facility: HOSPITAL | Age: 53
End: 2023-07-19
Attending: INTERNAL MEDICINE
Payer: COMMERCIAL

## 2023-07-19 DIAGNOSIS — Z12.5 PROSTATE CANCER SCREENING: ICD-10-CM

## 2023-07-19 DIAGNOSIS — Z00.00 ROUTINE MEDICAL EXAM: ICD-10-CM

## 2023-07-19 DIAGNOSIS — K51.90 ULCERATIVE COLITIS WITHOUT COMPLICATIONS, UNSPECIFIED LOCATION: ICD-10-CM

## 2023-07-19 DIAGNOSIS — I10 ESSENTIAL HYPERTENSION: ICD-10-CM

## 2023-07-19 DIAGNOSIS — Z79.899 ENCOUNTER FOR LONG-TERM CURRENT USE OF HIGH RISK MEDICATION: ICD-10-CM

## 2023-07-19 DIAGNOSIS — F33.1 MODERATE EPISODE OF RECURRENT MAJOR DEPRESSIVE DISORDER: ICD-10-CM

## 2023-07-19 DIAGNOSIS — E11.9 TYPE 2 DIABETES MELLITUS WITHOUT COMPLICATION, WITHOUT LONG-TERM CURRENT USE OF INSULIN: ICD-10-CM

## 2023-07-19 LAB
25(OH)D3+25(OH)D2 SERPL-MCNC: 106 NG/ML (ref 30–96)
ALBUMIN SERPL BCP-MCNC: 3.9 G/DL (ref 3.5–5.2)
ALP SERPL-CCNC: 81 U/L (ref 55–135)
ALT SERPL W/O P-5'-P-CCNC: 19 U/L (ref 10–44)
ANION GAP SERPL CALC-SCNC: 12 MMOL/L (ref 8–16)
AST SERPL-CCNC: 16 U/L (ref 10–40)
BASOPHILS # BLD AUTO: 0.05 K/UL (ref 0–0.2)
BASOPHILS NFR BLD: 0.6 % (ref 0–1.9)
BILIRUB SERPL-MCNC: 1.3 MG/DL (ref 0.1–1)
BUN SERPL-MCNC: 11 MG/DL (ref 6–20)
CALCIUM SERPL-MCNC: 9.5 MG/DL (ref 8.7–10.5)
CHLORIDE SERPL-SCNC: 104 MMOL/L (ref 95–110)
CHOLEST SERPL-MCNC: 170 MG/DL (ref 120–199)
CHOLEST/HDLC SERPL: 4 {RATIO} (ref 2–5)
CO2 SERPL-SCNC: 23 MMOL/L (ref 23–29)
COMPLEXED PSA SERPL-MCNC: 1.4 NG/ML (ref 0–4)
CREAT SERPL-MCNC: 1 MG/DL (ref 0.5–1.4)
DIFFERENTIAL METHOD: ABNORMAL
EOSINOPHIL # BLD AUTO: 0.3 K/UL (ref 0–0.5)
EOSINOPHIL NFR BLD: 3.9 % (ref 0–8)
ERYTHROCYTE [DISTWIDTH] IN BLOOD BY AUTOMATED COUNT: 12.9 % (ref 11.5–14.5)
EST. GFR  (NO RACE VARIABLE): >60 ML/MIN/1.73 M^2
ESTIMATED AVG GLUCOSE: 117 MG/DL (ref 68–131)
GLUCOSE SERPL-MCNC: 104 MG/DL (ref 70–110)
HBA1C MFR BLD: 5.7 % (ref 4–5.6)
HCT VFR BLD AUTO: 49.3 % (ref 40–54)
HDLC SERPL-MCNC: 42 MG/DL (ref 40–75)
HDLC SERPL: 24.7 % (ref 20–50)
HGB BLD-MCNC: 16.1 G/DL (ref 14–18)
IMM GRANULOCYTES # BLD AUTO: 0.03 K/UL (ref 0–0.04)
IMM GRANULOCYTES NFR BLD AUTO: 0.3 % (ref 0–0.5)
LDLC SERPL CALC-MCNC: 95.2 MG/DL (ref 63–159)
LYMPHOCYTES # BLD AUTO: 2.3 K/UL (ref 1–4.8)
LYMPHOCYTES NFR BLD: 26.3 % (ref 18–48)
MCH RBC QN AUTO: 28.9 PG (ref 27–31)
MCHC RBC AUTO-ENTMCNC: 32.7 G/DL (ref 32–36)
MCV RBC AUTO: 89 FL (ref 82–98)
MONOCYTES # BLD AUTO: 0.8 K/UL (ref 0.3–1)
MONOCYTES NFR BLD: 9.7 % (ref 4–15)
NEUTROPHILS # BLD AUTO: 5.1 K/UL (ref 1.8–7.7)
NEUTROPHILS NFR BLD: 59.2 % (ref 38–73)
NONHDLC SERPL-MCNC: 128 MG/DL
NRBC BLD-RTO: 0 /100 WBC
PLATELET # BLD AUTO: 287 K/UL (ref 150–450)
PMV BLD AUTO: 8.7 FL (ref 9.2–12.9)
POTASSIUM SERPL-SCNC: 4.4 MMOL/L (ref 3.5–5.1)
PROT SERPL-MCNC: 7.1 G/DL (ref 6–8.4)
RBC # BLD AUTO: 5.57 M/UL (ref 4.6–6.2)
SODIUM SERPL-SCNC: 139 MMOL/L (ref 136–145)
TRIGL SERPL-MCNC: 164 MG/DL (ref 30–150)
VIT B12 SERPL-MCNC: 790 PG/ML (ref 210–950)
WBC # BLD AUTO: 8.68 K/UL (ref 3.9–12.7)

## 2023-07-19 PROCEDURE — 83036 HEMOGLOBIN GLYCOSYLATED A1C: CPT | Performed by: INTERNAL MEDICINE

## 2023-07-19 PROCEDURE — 36415 COLL VENOUS BLD VENIPUNCTURE: CPT | Performed by: INTERNAL MEDICINE

## 2023-07-19 PROCEDURE — 80053 COMPREHEN METABOLIC PANEL: CPT | Performed by: INTERNAL MEDICINE

## 2023-07-19 PROCEDURE — 85025 COMPLETE CBC W/AUTO DIFF WBC: CPT | Performed by: INTERNAL MEDICINE

## 2023-07-19 PROCEDURE — 82306 VITAMIN D 25 HYDROXY: CPT | Performed by: INTERNAL MEDICINE

## 2023-07-19 PROCEDURE — 80061 LIPID PANEL: CPT | Performed by: INTERNAL MEDICINE

## 2023-07-19 PROCEDURE — 82607 VITAMIN B-12: CPT | Performed by: INTERNAL MEDICINE

## 2023-07-19 PROCEDURE — 84153 ASSAY OF PSA TOTAL: CPT | Performed by: INTERNAL MEDICINE

## 2023-07-19 RX ORDER — SEMAGLUTIDE 0.68 MG/ML
0.5 INJECTION, SOLUTION SUBCUTANEOUS
Qty: 3 ML | Refills: 5 | Status: SHIPPED | OUTPATIENT
Start: 2023-07-19 | End: 2024-02-21 | Stop reason: SDUPTHER

## 2023-07-19 RX ORDER — BUPROPION HYDROCHLORIDE 300 MG/1
300 TABLET ORAL DAILY
Qty: 90 TABLET | Refills: 1 | Status: SHIPPED | OUTPATIENT
Start: 2023-07-19

## 2023-07-19 RX ORDER — VALSARTAN 160 MG/1
160 TABLET ORAL DAILY
Qty: 90 TABLET | Refills: 1 | Status: SHIPPED | OUTPATIENT
Start: 2023-07-19

## 2023-07-21 ENCOUNTER — TELEPHONE (OUTPATIENT)
Dept: ENDOSCOPY | Facility: HOSPITAL | Age: 53
End: 2023-07-21
Payer: COMMERCIAL

## 2023-07-21 ENCOUNTER — PATIENT MESSAGE (OUTPATIENT)
Dept: ENDOSCOPY | Facility: HOSPITAL | Age: 53
End: 2023-07-21
Payer: COMMERCIAL

## 2023-07-21 NOTE — TELEPHONE ENCOUNTER
Pt called back and explained that the colonoscopy is nit within his budget at this time. He will not be having this procedure.

## 2023-07-21 NOTE — TELEPHONE ENCOUNTER
Procedure: Ambulatory referral/consult to Endo Procedure  Status: Needs Scheduling (Sent to Patient)     Requested appt date: 2/26/2023 Authorizing: Edmund Marie MD in [facility] GASTROENTEROLOGY     Referral: [id number] (Authorized)         Expires: 8/25/2023 Priority: Routine     Assign to: RUTHANN AGUIRRE       Diagnosis: IBD (inflammatory bowel disease) [K52.9]       Comments     Endoscopy schedulers please schedule Jl with me for colonoscopy for inflammatory bowel disease follow-up surveillance     Order Specific Questions     What procedure is to be performed?     Screening Colonoscopy        CPT Code:     COLORECTAL SCRN; HI RISK IND []

## 2023-07-21 NOTE — TELEPHONE ENCOUNTER
Contacted the patient to schedule an endoscopy procedure(s) Colonoscopy. The patient did not answer the call. I left a voice message, and I sent a message through the portal requesting a call back.

## 2023-08-26 NOTE — PROGRESS NOTES
Jl your vitamin-D level is slightly elevated so if your taking a vitamin-D supplement you may want to back off of it slightly

## 2023-10-11 ENCOUNTER — PATIENT MESSAGE (OUTPATIENT)
Dept: PRIMARY CARE CLINIC | Facility: CLINIC | Age: 53
End: 2023-10-11
Payer: COMMERCIAL

## 2023-10-27 ENCOUNTER — PATIENT MESSAGE (OUTPATIENT)
Dept: PRIMARY CARE CLINIC | Facility: CLINIC | Age: 53
End: 2023-10-27
Payer: COMMERCIAL

## 2023-12-24 DIAGNOSIS — N52.9 ERECTILE DYSFUNCTION, UNSPECIFIED ERECTILE DYSFUNCTION TYPE: ICD-10-CM

## 2023-12-24 NOTE — TELEPHONE ENCOUNTER
No care due was identified.  Health [facility] Embedded Care Due Messages. Reference number: [id number].   12/24/2023 3:25:36 PM CST

## 2023-12-26 RX ORDER — SILDENAFIL 50 MG/1
50 TABLET, FILM COATED ORAL DAILY PRN
Qty: 6 TABLET | Refills: 5 | Status: SHIPPED | OUTPATIENT
Start: 2023-12-26

## 2023-12-26 NOTE — TELEPHONE ENCOUNTER
Refill Decision Note   Jl Araiza  is requesting a refill authorization.  Brief Assessment and Rationale for Refill:  Approve     Medication Therapy Plan:        Pharmacist review requested: Yes   Extended chart review required: Yes   Comments:     Note composed:4:07 PM 12/26/2023

## 2023-12-26 NOTE — TELEPHONE ENCOUNTER
Refill Routing Note   Medication(s) are not appropriate for processing by [facility] for the following reason(s):        Drug-disease interaction    [facility] action(s):  Defer        Medication Therapy Plan: Drug-Disease: sildenafiL and Elevated bilirubin    Pharmacist review requested: Yes     Appointments  past 12m or future 3m with PCP    Date Provider   Last Visit   7/18/2023 Corine Taylor MD   Next Visit   Visit date not found Corine Taylor MD   ED visits in past 90 days: 0        Note composed:4:03 PM 12/26/2023

## 2024-01-03 DIAGNOSIS — E78.5 HYPERLIPIDEMIA, UNSPECIFIED HYPERLIPIDEMIA TYPE: ICD-10-CM

## 2024-01-03 RX ORDER — PRAVASTATIN SODIUM 40 MG/1
40 TABLET ORAL
Qty: 90 TABLET | Refills: 1 | Status: SHIPPED | OUTPATIENT
Start: 2024-01-03

## 2024-01-03 NOTE — TELEPHONE ENCOUNTER
No care due was identified.  Health [facility] Embedded Care Due Messages. Reference number: [id number].   1/03/2024 9:19:30 AM CST

## 2024-01-04 NOTE — TELEPHONE ENCOUNTER
Refill Decision Note   Jl Araiza  is requesting a refill authorization.  Brief Assessment and Rationale for Refill:  Approve     Medication Therapy Plan:         Comments:     Note composed:11:36 PM 01/03/2024

## 2024-01-12 DIAGNOSIS — K51.90 ULCERATIVE COLITIS WITHOUT COMPLICATIONS, UNSPECIFIED LOCATION: ICD-10-CM

## 2024-01-13 ENCOUNTER — TELEPHONE (OUTPATIENT)
Dept: GASTROENTEROLOGY | Facility: CLINIC | Age: 54
End: 2024-01-13
Payer: COMMERCIAL

## 2024-01-13 DIAGNOSIS — Z51.81 ENCOUNTER FOR MONITORING LONG-TERM PROTON PUMP INHIBITOR THERAPY: ICD-10-CM

## 2024-01-13 DIAGNOSIS — K52.9 IBD (INFLAMMATORY BOWEL DISEASE): ICD-10-CM

## 2024-01-13 DIAGNOSIS — Z79.899 ENCOUNTER FOR LONG-TERM (CURRENT) USE OF HIGH-RISK MEDICATION: Primary | ICD-10-CM

## 2024-01-13 DIAGNOSIS — Z79.899 ENCOUNTER FOR MONITORING LONG-TERM PROTON PUMP INHIBITOR THERAPY: ICD-10-CM

## 2024-01-13 RX ORDER — MESALAMINE 0.38 G/1
1.5 CAPSULE, EXTENDED RELEASE ORAL DAILY
Qty: 360 CAPSULE | Refills: 1 | Status: SHIPPED | OUTPATIENT
Start: 2024-01-13

## 2024-01-17 ENCOUNTER — TELEPHONE (OUTPATIENT)
Dept: ENDOSCOPY | Facility: HOSPITAL | Age: 54
End: 2024-01-17
Payer: COMMERCIAL

## 2024-01-17 NOTE — TELEPHONE ENCOUNTER
"----- Message from Mary Guajardo sent at 2024  8:11 AM CST -----    ----- Message -----  From: Edmund Marie MD  Sent: 2024   8:27 AM CST  To: [facility] Patients    Procedure: EGD/Colonoscopy    Diagnosis: Surveillance colonoscopy - Hx of colon polyps, IBD, erosive esophagitis    Procedure Timin-12 weeks    #If within 4 weeks selected, please toño as high priority#    #If greater than 12 weeks, please select "5-12 weeks" and delay sending until 3 months prior to requested date#     Provider: Myself    Location: [address]    Additional Scheduling Information:  He takes Ozempic    Prep Specifications:  Constipation bowel prep protocol    Is the patient taking a GLP-1 Agonist:yes he takes Ozempic    Have you attached a patient to this message: yes       "

## 2024-01-17 NOTE — TELEPHONE ENCOUNTER
Contacted the patient to schedule an endoscopy procedure(s) 01/17/24. The patient did not answer the call and left a voice message requesting a call back.

## 2024-02-01 ENCOUNTER — OFFICE VISIT (OUTPATIENT)
Dept: INTERNAL MEDICINE | Facility: CLINIC | Age: 54
End: 2024-02-01
Payer: COMMERCIAL

## 2024-02-01 VITALS
HEIGHT: 69 IN | DIASTOLIC BLOOD PRESSURE: 80 MMHG | BODY MASS INDEX: 35.92 KG/M2 | HEART RATE: 105 BPM | WEIGHT: 242.5 LBS | SYSTOLIC BLOOD PRESSURE: 110 MMHG | OXYGEN SATURATION: 97 %

## 2024-02-01 DIAGNOSIS — J06.9 URTI (ACUTE UPPER RESPIRATORY INFECTION): Primary | ICD-10-CM

## 2024-02-01 DIAGNOSIS — U07.1 COVID: ICD-10-CM

## 2024-02-01 LAB
CTP QC/QA: YES
CTP QC/QA: YES
FLUAV AG NPH QL: NEGATIVE
FLUBV AG NPH QL: NEGATIVE
SARS-COV-2 RDRP RESP QL NAA+PROBE: POSITIVE

## 2024-02-01 PROCEDURE — 87635 SARS-COV-2 COVID-19 AMP PRB: CPT | Mod: QW,S$GLB,, | Performed by: INTERNAL MEDICINE

## 2024-02-01 PROCEDURE — 1159F MED LIST DOCD IN RCRD: CPT | Mod: CPTII,S$GLB,, | Performed by: INTERNAL MEDICINE

## 2024-02-01 PROCEDURE — 3079F DIAST BP 80-89 MM HG: CPT | Mod: CPTII,S$GLB,, | Performed by: INTERNAL MEDICINE

## 2024-02-01 PROCEDURE — 3074F SYST BP LT 130 MM HG: CPT | Mod: CPTII,S$GLB,, | Performed by: INTERNAL MEDICINE

## 2024-02-01 PROCEDURE — 1160F RVW MEDS BY RX/DR IN RCRD: CPT | Mod: CPTII,S$GLB,, | Performed by: INTERNAL MEDICINE

## 2024-02-01 PROCEDURE — 3008F BODY MASS INDEX DOCD: CPT | Mod: CPTII,S$GLB,, | Performed by: INTERNAL MEDICINE

## 2024-02-01 PROCEDURE — 87804 INFLUENZA ASSAY W/OPTIC: CPT | Mod: 59,QW,S$GLB, | Performed by: INTERNAL MEDICINE

## 2024-02-01 PROCEDURE — 99999 PR PBB SHADOW E&M-EST. PATIENT-LVL III: CPT | Mod: PBBFAC,,, | Performed by: INTERNAL MEDICINE

## 2024-02-01 PROCEDURE — 99213 OFFICE O/P EST LOW 20 MIN: CPT | Mod: S$GLB,,, | Performed by: INTERNAL MEDICINE

## 2024-02-01 RX ORDER — NIRMATRELVIR AND RITONAVIR 300-100 MG
KIT ORAL
Qty: 30 TABLET | Refills: 0 | Status: SHIPPED | OUTPATIENT
Start: 2024-02-01 | End: 2024-02-06

## 2024-02-01 NOTE — PROGRESS NOTES
Subjective:       Patient ID: Jl Araiza is a 53 y.o. male.    Chief Complaint: COVID-19 Post Vaccine Symptoms    Here for urgent care --  4 days ago exposed to someone covid +. 2 days ago took covid test and neg. Now current symptoms -- back soreness, cough, nasal clogged, feverish feeling. No SOB/chest pains.      Review of Systems   Constitutional:  Negative for activity change.   HENT:  Positive for congestion and sore throat.    Respiratory:  Positive for cough.            Objective:      Physical Exam  Constitutional:       General: He is not in acute distress.     Appearance: Normal appearance. He is well-developed. He is not ill-appearing, toxic-appearing or diaphoretic.   HENT:      Head: Normocephalic and atraumatic.      Nose:      Comments: No sinus tenderness to deep palpation, no nasal purulent mucous seen. Throat red appearing, no exudates     Mouth/Throat:      Pharynx: Oropharynx is clear. No oropharyngeal exudate.   Eyes:      General: No scleral icterus.     Pupils: Pupils are equal, round, and reactive to light.   Neck:      Thyroid: No thyromegaly.   Cardiovascular:      Rate and Rhythm: Normal rate and regular rhythm.   Pulmonary:      Effort: Pulmonary effort is normal. No respiratory distress.      Breath sounds: Normal breath sounds. No wheezing or rales.   Musculoskeletal:      Cervical back: Normal range of motion and neck supple.   Lymphadenopathy:      Cervical: No cervical adenopathy.   Neurological:      Mental Status: He is alert.   Psychiatric:         Behavior: Behavior normal.         Assessment:       1. URTI (acute upper respiratory infection)    2. COVID        Plan:       Jl was seen today for covid-19 post vaccine symptoms.    Diagnoses and all orders for this visit:    URTI (acute upper respiratory infection)  -     POCT Influenza A/B  -     POCT COVID-19 Rapid Screening  Covid +    COVID  -     nirmatrelvir-ritonavir (PAXLOVID) 300 mg (150 mg x 2)-100 mg copackaged  tablets (EUA); Take 3 tablets by mouth 2 (two) times daily. Each dose contains 2 nirmatrelvir (pink tablets) and 1 ritonavir (white tablet). Take all 3 tablets together  Explained that if not better in 1-2 weeks, pt should rtc/call PCP

## 2024-02-21 DIAGNOSIS — E11.9 TYPE 2 DIABETES MELLITUS WITHOUT COMPLICATION, WITHOUT LONG-TERM CURRENT USE OF INSULIN: ICD-10-CM

## 2024-02-21 RX ORDER — SEMAGLUTIDE 0.68 MG/ML
0.5 INJECTION, SOLUTION SUBCUTANEOUS
Qty: 3 ML | Refills: 0 | Status: SHIPPED | OUTPATIENT
Start: 2024-02-21 | End: 2024-03-20 | Stop reason: SDUPTHER

## 2024-02-21 NOTE — TELEPHONE ENCOUNTER
Refill Routing Note   Medication(s) are not appropriate for processing by [facility] for the following reason(s):        Required labs outdated    [facility] action(s):  Defer     Requires labs : Yes             Appointments  past 12m or future 3m with PCP    Date Provider   Last Visit   7/18/2023 Corine Taylor MD   Next Visit   Visit date not found Corine Taylor MD   ED visits in past 90 days: 0        Note composed:8:46 AM 02/21/2024

## 2024-02-21 NOTE — TELEPHONE ENCOUNTER
Care Due:                  Date            Visit Type   Department     Provider  --------------------------------------------------------------------------------                                MYCHART                              FOLLOWUP/OF  [facility] PRIMARY   Corineopal Aguilera  Last Visit: 07-      FICE VISIT   CARE           Brandon  Next Visit: None Scheduled  None         None Found                                                            Last  Test          Frequency    Reason                     Performed    Due Date  --------------------------------------------------------------------------------    HBA1C.......  6 months...  semaglutide..............  07-   01-    [facility] Embedded Care Due Messages. Reference number: [id number].   2/21/2024 7:22:14 AM CST

## 2024-03-20 DIAGNOSIS — E11.9 TYPE 2 DIABETES MELLITUS WITHOUT COMPLICATION, WITHOUT LONG-TERM CURRENT USE OF INSULIN: ICD-10-CM

## 2024-03-20 RX ORDER — SEMAGLUTIDE 0.68 MG/ML
0.5 INJECTION, SOLUTION SUBCUTANEOUS
Qty: 3 ML | Refills: 0 | Status: SHIPPED | OUTPATIENT
Start: 2024-03-20 | End: 2024-05-06 | Stop reason: SDUPTHER

## 2024-03-20 NOTE — TELEPHONE ENCOUNTER
Refill Routing Note   Medication(s) are not appropriate for processing by [facility] for the following reason(s):        Required labs outdated    [facility] action(s):  Defer               Appointments  past 12m or future 3m with PCP    Date Provider   Last Visit   7/18/2023 Corine Taylor MD   Next Visit   Visit date not found Corine Taylor MD   ED visits in past 90 days: 0        Note composed:1:06 PM 03/20/2024

## 2024-03-20 NOTE — TELEPHONE ENCOUNTER
No care due was identified.  Health [facility] Embedded Care Due Messages. Reference number: [id number].   3/20/2024 6:52:50 AM CDT

## 2024-04-14 DIAGNOSIS — F41.1 GAD (GENERALIZED ANXIETY DISORDER): ICD-10-CM

## 2024-04-14 NOTE — TELEPHONE ENCOUNTER
Care Due:                  Date            Visit Type   Department     Provider  --------------------------------------------------------------------------------                                MYCHART                              FOLLOWUP/OF  [facility] PRIMARY   Corine Aguilera  Last Visit: 07-      FICE VISIT   CARE           Brandon  Next Visit: None Scheduled  None         None Found                                                            Last  Test          Frequency    Reason                     Performed    Due Date  --------------------------------------------------------------------------------    Office Visit  12 months..  busPIRone................  07- 07-    CMP.........  12 months..  pravastatin, valsartan...  07- 07-    Lipid Panel.  12 months..  pravastatin..............  07- 07-    [facility] [employer] Embedded Care Due Messages. Reference number: [id number].   4/14/2024 12:48:32 PM CDT

## 2024-04-15 RX ORDER — BUSPIRONE HYDROCHLORIDE 15 MG/1
15 TABLET ORAL 2 TIMES DAILY
Qty: 180 TABLET | Refills: 0 | Status: SHIPPED | OUTPATIENT
Start: 2024-04-15 | End: 2024-05-06 | Stop reason: SDUPTHER

## 2024-05-06 DIAGNOSIS — F41.1 GAD (GENERALIZED ANXIETY DISORDER): ICD-10-CM

## 2024-05-06 DIAGNOSIS — I10 ESSENTIAL HYPERTENSION: ICD-10-CM

## 2024-05-06 DIAGNOSIS — K51.90 ULCERATIVE COLITIS WITHOUT COMPLICATIONS, UNSPECIFIED LOCATION: ICD-10-CM

## 2024-05-06 DIAGNOSIS — E11.9 TYPE 2 DIABETES MELLITUS WITHOUT COMPLICATION, WITHOUT LONG-TERM CURRENT USE OF INSULIN: ICD-10-CM

## 2024-05-06 DIAGNOSIS — F33.1 MODERATE EPISODE OF RECURRENT MAJOR DEPRESSIVE DISORDER: ICD-10-CM

## 2024-05-06 DIAGNOSIS — E78.5 HYPERLIPIDEMIA, UNSPECIFIED HYPERLIPIDEMIA TYPE: ICD-10-CM

## 2024-05-06 RX ORDER — BUSPIRONE HYDROCHLORIDE 15 MG/1
15 TABLET ORAL 2 TIMES DAILY
Qty: 180 TABLET | Refills: 0 | Status: SHIPPED | OUTPATIENT
Start: 2024-05-06

## 2024-05-06 RX ORDER — VALSARTAN 160 MG/1
160 TABLET ORAL DAILY
Qty: 90 TABLET | Refills: 0 | Status: SHIPPED | OUTPATIENT
Start: 2024-05-06

## 2024-05-06 RX ORDER — PRAVASTATIN SODIUM 40 MG/1
40 TABLET ORAL DAILY
Qty: 90 TABLET | Refills: 0 | Status: SHIPPED | OUTPATIENT
Start: 2024-05-06

## 2024-05-06 RX ORDER — BUPROPION HYDROCHLORIDE 300 MG/1
300 TABLET ORAL DAILY
Qty: 90 TABLET | Refills: 0 | Status: SHIPPED | OUTPATIENT
Start: 2024-05-06

## 2024-05-06 RX ORDER — SEMAGLUTIDE 0.68 MG/ML
0.5 INJECTION, SOLUTION SUBCUTANEOUS
Qty: 9 ML | Refills: 0 | Status: SHIPPED | OUTPATIENT
Start: 2024-05-06

## 2024-05-06 NOTE — TELEPHONE ENCOUNTER
Care Due:                  Date            Visit Type   Department     Provider  --------------------------------------------------------------------------------                                MYCHART                              FOLLOWUP/OF  [facility] PRIMARY   Corineopal Aguilera  Last Visit: 07-      FICE VISIT   CARE           Brandon  Next Visit: None Scheduled  None         None Found                                                            Last  Test          Frequency    Reason                     Performed    Due Date  --------------------------------------------------------------------------------    HBA1C.......  6 months...  semaglutide..............  07-   01-    [facility] Embedded Care Due Messages. Reference number: [id number].   5/06/2024 7:00:15 AM CDT

## 2024-05-07 RX ORDER — MESALAMINE 0.38 G/1
1.5 CAPSULE, EXTENDED RELEASE ORAL DAILY
Qty: 360 CAPSULE | Refills: 1 | Status: SHIPPED | OUTPATIENT
Start: 2024-05-07

## 2024-07-19 ENCOUNTER — HOSPITAL ENCOUNTER (EMERGENCY)
Facility: HOSPITAL | Age: 54
Discharge: HOME | End: 2024-07-19
Attending: EMERGENCY MEDICINE
Payer: COMMERCIAL

## 2024-07-19 ENCOUNTER — OFFICE VISIT (OUTPATIENT)
Dept: URGENT CARE | Facility: CLINIC | Age: 54
End: 2024-07-19
Payer: COMMERCIAL

## 2024-07-19 VITALS
TEMPERATURE: 99 F | OXYGEN SATURATION: 97 % | RESPIRATION RATE: 18 BRPM | DIASTOLIC BLOOD PRESSURE: 91 MMHG | HEART RATE: 104 BPM | BODY MASS INDEX: 35.44 KG/M2 | WEIGHT: 240 LBS | SYSTOLIC BLOOD PRESSURE: 150 MMHG

## 2024-07-19 VITALS
DIASTOLIC BLOOD PRESSURE: 92 MMHG | WEIGHT: 240.13 LBS | RESPIRATION RATE: 20 BRPM | TEMPERATURE: 98 F | HEART RATE: 99 BPM | BODY MASS INDEX: 35.57 KG/M2 | SYSTOLIC BLOOD PRESSURE: 128 MMHG | HEIGHT: 69 IN | OXYGEN SATURATION: 99 %

## 2024-07-19 DIAGNOSIS — M19.90 ACUTE ARTHRITIS: ICD-10-CM

## 2024-07-19 DIAGNOSIS — M25.562 ARTHRALGIA OF LEFT KNEE: Primary | ICD-10-CM

## 2024-07-19 DIAGNOSIS — M25.562 ACUTE PAIN OF LEFT KNEE: Primary | ICD-10-CM

## 2024-07-19 PROCEDURE — 99214 OFFICE O/P EST MOD 30 MIN: CPT | Mod: 25,S$GLB,, | Performed by: FAMILY MEDICINE

## 2024-07-19 PROCEDURE — 84550 ASSAY OF BLOOD/URIC ACID: CPT | Performed by: FAMILY MEDICINE

## 2024-07-19 PROCEDURE — 73562 X-RAY EXAM OF KNEE 3: CPT | Mod: LT,S$GLB,, | Performed by: RADIOLOGY

## 2024-07-19 PROCEDURE — 96372 THER/PROPH/DIAG INJ SC/IM: CPT | Mod: S$GLB,,, | Performed by: FAMILY MEDICINE

## 2024-07-19 PROCEDURE — 25000003 PHARM REV CODE 250: Performed by: EMERGENCY MEDICINE

## 2024-07-19 PROCEDURE — 86431 RHEUMATOID FACTOR QUANT: CPT | Performed by: FAMILY MEDICINE

## 2024-07-19 PROCEDURE — 99283 EMERGENCY DEPT VISIT LOW MDM: CPT

## 2024-07-19 RX ORDER — KETOROLAC TROMETHAMINE 30 MG/ML
30 INJECTION, SOLUTION INTRAMUSCULAR; INTRAVENOUS
Status: COMPLETED | OUTPATIENT
Start: 2024-07-19 | End: 2024-07-19

## 2024-07-19 RX ORDER — NAPROXEN 500 MG/1
500 TABLET ORAL
Status: COMPLETED | OUTPATIENT
Start: 2024-07-19 | End: 2024-07-19

## 2024-07-19 RX ADMIN — KETOROLAC TROMETHAMINE 30 MG: 30 INJECTION, SOLUTION INTRAMUSCULAR; INTRAVENOUS at 07:07

## 2024-07-19 RX ADMIN — NAPROXEN 500 MG: 500 TABLET ORAL at 09:07

## 2024-07-19 NOTE — PROGRESS NOTES
"Subjective:      Patient ID: Jl Araiza is a 54 y.o. male.    Vitals:  height is 5' 9" (1.753 m) and weight is 108.9 kg (240 lb 1.6 oz). His oral temperature is 98.3 °F (36.8 °C). His blood pressure is 128/92 (abnormal) and his pulse is 99. His respiration is 20 and oxygen saturation is 99%.     Chief Complaint: Knee Pain    Patient presents with left knee pain. Patient states the pain started about 2-3 hours ago and is gradually getting worse.  Patient denies any obvious injuries.  Denies any previous history of arthritis or gout.  Denies any fever, chills, fatigue, weakness.  2 aleve's at 5:30    Knee Pain   The incident occurred 1 to 3 hours ago. The incident occurred at home. There was no injury mechanism. The pain is present in the left knee. The quality of the pain is described as aching. The pain is at a severity of 8/10. The pain is moderate. The pain has been Constant since onset. Associated symptoms include an inability to bear weight. Nothing aggravates the symptoms. The treatment provided no relief.     ROS   Objective:     Physical Exam   Constitutional: He is oriented to person, place, and time. He appears well-developed. He is cooperative.   HENT:   Head: Normocephalic and atraumatic.   Ears:   Right Ear: Hearing and external ear normal.   Left Ear: Hearing and external ear normal.   Nose: Nose normal. No mucosal edema or nasal deformity. No epistaxis. Right sinus exhibits no maxillary sinus tenderness and no frontal sinus tenderness. Left sinus exhibits no maxillary sinus tenderness and no frontal sinus tenderness.   Mouth/Throat: Uvula is midline, oropharynx is clear and moist and mucous membranes are normal. No trismus in the jaw. Normal dentition. No uvula swelling.   Eyes: Conjunctivae and lids are normal.   Neck: Trachea normal and phonation normal. Neck supple.   Cardiovascular: Normal rate, regular rhythm, normal heart sounds and normal pulses.   No murmur heard.  Pulmonary/Chest: " Effort normal and breath sounds normal. No stridor. No respiratory distress. He has no wheezes. He has no rhonchi. He has no rales.   Abdominal: Normal appearance and bowel sounds are normal. Soft.   Musculoskeletal:      Comments:   Left Knee Exam:    Positive mild diffuse swelling  Positive mild effusion.  Positive warmth  +ve tenderness to medial and lateral aspect of  .  ROM, markedly restricted      Neurological: He is alert and oriented to person, place, and time. He exhibits normal muscle tone.   Skin: Skin is warm, dry and intact.   Psychiatric: His speech is normal and behavior is normal. Judgment and thought content normal.   Nursing note and vitals reviewed.      Assessment:     1. Acute pain of left knee    2. Acute arthritis        Plan:   Knee x-rays unremarkable other than small suprapatellar effusion.  Considering acuteness of symptoms, in DD I will include knee sprain, gout, acute arthritis.  However Septic arthritis can not be ruled out because of swelling, warmth and restricted knee movements.  I referred patient to ED to rule out septic arthritis.    ED at [facility] is notified.    Patient agreed to plan.  Acute pain of left knee  -     X-Ray Knee 3 View Left; Future; Expected date: 07/19/2024  -     Cancel: URIC ACID  -     URIC ACID  -     RHEUMATOID FACTOR  -     Refer to Emergency Dept.    Acute arthritis  -     Refer to Emergency Dept.    Other orders  -     ketorolac injection 30 mg

## 2024-07-20 LAB
RHEUMATOID FACT SERPL-ACNC: <13 IU/ML (ref 0–15)
URATE SERPL-MCNC: 6.6 MG/DL (ref 3.4–7)

## 2024-07-20 NOTE — ED PROVIDER NOTES
Chief Complaint   Knee Pain (Patient reports that he began with left knee pain at 430 pm today. States went to  and they recommended come to the ED. )      History Of Present Illness   Jl Araiza is a 54 y.o. male presenting with atraumatic left knee pain that started this afternoon.  He was walking today when he felt a twinge.  The pain slowly and steadily increased from there.  Does not recall any trauma.  The patient does walk 4 miles per day for healthy lifestyle.  No fever or chills.  No skin rashes.  No history of gout.        Review of patient's allergies indicates:   Allergen Reactions    Cat/feline products Anaphylaxis     Difficulty breathing    Cigarette smoke Shortness Of Breath     Difficulty breathing       Current Facility-Administered Medications on File Prior to Encounter   Medication Dose Route Frequency Provider Last Rate Last Admin    [COMPLETED] ketorolac injection 30 mg  30 mg Intramuscular 1 time in Clinic/HOD    30 mg at 07/19/24 1938     Current Outpatient Medications on File Prior to Encounter   Medication Sig Dispense Refill    buPROPion (WELLBUTRIN XL) 300 MG 24 hr tablet Take 1 tablet (300 mg total) by mouth once daily. 90 tablet 0    busPIRone (BUSPAR) 15 MG tablet Take 1 tablet (15 mg total) by mouth 2 (two) times daily. 180 tablet 0    mesalamine (APRISO) 0.375 gram Cp24 Take 4 capsules (1.5 g total) by mouth once daily. 360 capsule 1    pravastatin (PRAVACHOL) 40 MG tablet Take 1 tablet (40 mg total) by mouth once daily. 90 tablet 0    semaglutide (OZEMPIC) 0.25 mg or 0.5 mg (2 mg/3 mL) pen injector Inject 0.5 mg into the skin every 7 days. 9 mL 0    sildenafiL (VIAGRA) 50 MG tablet Take 1 tablet by mouth daily as needed. 6 tablet 5    valsartan (DIOVAN) 160 MG tablet Take 1 tablet (160 mg total) by mouth once daily. 90 tablet 0       Past History   As per HPI and below:  Past Medical History:   Diagnosis Date    Abdominal hernia     Anxiety     Elevated bilirubin 4/25/2022     Likely due to [place] per labs 5/2022    Essential hypertension 11/2/2015    MYAH (generalized anxiety disorder) 2/21/2018    Gilbert's disease 5/13/2022    Per labs 5/2022: decreased UGT1A1 activity and increased risk for hyperbilirubinemia and/or toxicity  with atazanavir, belinostat, dolutegravir, irinotecan, nilotinib, pazopanib, sacituzumab govitecan-hziy, and potentially other medications metabolized primarily by UGT1A1. Genotype is consistent with a diagnosis of Gilbert syndrome.     Hyperlipidemia     Lumbar disc disease with radiculopathy 8/7/2020    Obese     Recurrent major depressive disorder 3/22/2022    Type 2 diabetes mellitus without complication, without long-term current use of insulin 11/2/2015    UC (ulcerative colitis)      Past Surgical History:   Procedure Laterality Date    COLONOSCOPY N/A 6/15/2016    Procedure: COLONOSCOPY;  Surgeon: Edmund Marie MD;  Location: [address]);  Service: Endoscopy;  Laterality: N/A;  Patient reports that during his last Colonoscopy, he did not fall asleep.    COLONOSCOPY N/A 8/4/2017    Procedure: COLONOSCOPY;  Surgeon: Mya Booth MD;  Location: [facility] ([address] [place] [address]);  Service: Endoscopy;  Laterality: N/A;  schedule as 45 minute case    COLONOSCOPY N/A 8/22/2019    Procedure: COLONOSCOPY;  Surgeon: Edmund Marie MD;  Location: [address]);  Service: Endoscopy;  Laterality: N/A;    EPIDURAL STEROID INJECTION INTO LUMBAR SPINE N/A 5/3/2022    Procedure: Lumbar Epidural Steroid Injection L5-S1 (Oral Sedation);  Surgeon: Fernando Cabezas Jr., MD;  Location: [facility];  Service: Pain Management;  Laterality: N/A;  diabetic       ESOPHAGOGASTRODUODENOSCOPY      HERNIA REPAIR  Umbilical Hernia    TRANSFORAMINAL EPIDURAL INJECTION OF STEROID Right 8/18/2020    Procedure: Injection,steroid,epidural,transforaminal approach--Right L4-5;  Surgeon: Fernando Cabezas Jr., MD;  Location: [facility];  Service: Pain Management;   Laterality: Right;    TRANSFORAMINAL EPIDURAL INJECTION OF STEROID Right 9/8/2020    Procedure: Injection,steroid,epidural,transforaminal approach--right L5-S1 and S1;  Surgeon: Fernando Cabezas Jr., MD;  Location: [facility];  Service: Pain Management;  Laterality: Right;    VASECTOMY         Social History     Tobacco Use    Smoking status: Never    Smokeless tobacco: Never   Substance Use Topics    Alcohol use: No     Comment: Infrequently drinks one beer    Drug use: No       Family History   Problem Relation Name Age of Onset    Cancer Mother          Liver    Alcohol abuse Mother      Liver cancer Mother  54        ETOH liver disease    Cirrhosis Mother  54        ETOH liver disease    Alcohol abuse Father      Cushing syndrome Father      No Known Problems Sister      Stroke Paternal Grandfather      No Known Problems Maternal Aunt      Liver cancer Maternal Uncle      No Known Problems Paternal Aunt      No Known Problems Paternal Uncle      No Known Problems Maternal Grandmother      No Known Problems Maternal Grandfather      No Known Problems Paternal Grandmother      Amblyopia Neg Hx      Blindness Neg Hx      Cataracts Neg Hx      Diabetes Neg Hx      Glaucoma Neg Hx      Hypertension Neg Hx      Macular degeneration Neg Hx      Retinal detachment Neg Hx      Strabismus Neg Hx      Thyroid disease Neg Hx      Celiac disease Neg Hx      Colon cancer Neg Hx      Colon polyps Neg Hx      Crohn's disease Neg Hx      Esophageal cancer Neg Hx      Inflammatory bowel disease Neg Hx      Liver disease Neg Hx      Rectal cancer Neg Hx      Stomach cancer Neg Hx      Ulcerative colitis Neg Hx      Cystic fibrosis Neg Hx      Hemochromatosis Neg Hx      Irritable bowel syndrome Neg Hx      Harmeet's disease Neg Hx      Multiple sclerosis Neg Hx      Tuberculosis Neg Hx      Rheum arthritis Neg Hx      Scleroderma Neg Hx      Lymphoma Neg Hx         Physical Exam     Vitals:    07/19/24 2021   BP: (!) 150/91    Pulse: 104   Resp: 18   Temp: 98.5 °F (36.9 °C)   TempSrc: Oral   SpO2: 97%   Weight: 108.9 kg (240 lb)     Appearance: No acute distress.  Skin: No rashes seen.  Good turgor.  No abrasions.  No ecchymoses.  Musculoskeletal:  Pain with maximal extension of the left knee, but otherwise full range of motion.  Patient is able to move knee fairly well when flexed, but extension causes the most pain.  The knee is minimally warm.  No redness.  No large effusion.  Tenderness is located mostly over the lateral collateral ligament and lateral meniscus; some tenderness over the medial meniscus.  No tenderness at all posteriorly.  Neurologic: Motor intact.  Sensation intact. Cranial nerves intact.  Mental Status:  Alert and oriented x 3.  Appropriate, conversant.      Medications Given     Medications   naproxen tablet 500 mg (has no administration in time range)       Results and Course   Labs Reviewed - No data to display    Imaging Results    None                      MDM, Impression and Plan   54 y.o. male with atraumatic knee pain, mostly anterior.  Not particularly warm, not red.  Minimal effusion at best.  Range of motion is intact, although he does have maximal discomfort at full knee extension.  I doubt septic arthritis given the exam.  Gout is also less likely.  Differential includes overuse injury, osteoarthritis, meniscal injury, knee sprain.  Patient notes he has an abnormal gait after multiple injuries on the right and did walk his usual for miles today.  I recommend rest, NSAIDs.  Referral to orthopedics placed.  Counseled him to return if pain progresses, redness develops, or swelling/warmth develop.           Final diagnoses:  [M25.562] Arthralgia of left knee (Primary)        ED Disposition Condition    Discharge Stable          ED Prescriptions    None       Follow-up Information       Follow up With Specialties Details Why Contact Info Additional Information    Debra Shen B - Sports Med [facility]  Medicine Schedule an appointment as soon as possible for a visit   [address]  [facility] [address]  [phone] Please park in surface lot or garage and check in on the first floor, [address]               Jose Argueta MD  07/19/24 5621

## 2024-07-20 NOTE — ED TRIAGE NOTES
Pt presents to ED with c/o left knee pain starting around 4:30 pm today. Pt endorses visiting UC and receiving a Toradol shot with no relief. Pt endorses that UC worried for septic arthritis. Left knee is swollen and warmer than right. Pt endorses 4/10 left knee pain.

## 2024-07-21 ENCOUNTER — PATIENT MESSAGE (OUTPATIENT)
Dept: URGENT CARE | Facility: CLINIC | Age: 54
End: 2024-07-21
Payer: COMMERCIAL

## 2024-08-05 ENCOUNTER — OFFICE VISIT (OUTPATIENT)
Dept: SPORTS MEDICINE | Facility: CLINIC | Age: 54
End: 2024-08-05
Attending: EMERGENCY MEDICINE
Payer: COMMERCIAL

## 2024-08-05 VITALS — SYSTOLIC BLOOD PRESSURE: 124 MMHG | DIASTOLIC BLOOD PRESSURE: 81 MMHG | WEIGHT: 239.5 LBS | BODY MASS INDEX: 35.37 KG/M2

## 2024-08-05 DIAGNOSIS — M22.42 CHONDROMALACIA, PATELLA, LEFT: ICD-10-CM

## 2024-08-05 DIAGNOSIS — M25.462 EFFUSION OF LEFT KNEE JOINT: ICD-10-CM

## 2024-08-05 DIAGNOSIS — M25.562 ARTHRALGIA OF LEFT KNEE: Primary | ICD-10-CM

## 2024-08-05 PROCEDURE — 3079F DIAST BP 80-89 MM HG: CPT | Mod: CPTII,S$GLB,, | Performed by: PHYSICIAN ASSISTANT

## 2024-08-05 PROCEDURE — 4010F ACE/ARB THERAPY RXD/TAKEN: CPT | Mod: CPTII,S$GLB,, | Performed by: PHYSICIAN ASSISTANT

## 2024-08-05 PROCEDURE — 99204 OFFICE O/P NEW MOD 45 MIN: CPT | Mod: S$GLB,,, | Performed by: PHYSICIAN ASSISTANT

## 2024-08-05 PROCEDURE — 3008F BODY MASS INDEX DOCD: CPT | Mod: CPTII,S$GLB,, | Performed by: PHYSICIAN ASSISTANT

## 2024-08-05 PROCEDURE — 97110 THERAPEUTIC EXERCISES: CPT | Mod: GP,S$GLB,, | Performed by: PHYSICIAN ASSISTANT

## 2024-08-05 PROCEDURE — 1160F RVW MEDS BY RX/DR IN RCRD: CPT | Mod: CPTII,S$GLB,, | Performed by: PHYSICIAN ASSISTANT

## 2024-08-05 PROCEDURE — 3074F SYST BP LT 130 MM HG: CPT | Mod: CPTII,S$GLB,, | Performed by: PHYSICIAN ASSISTANT

## 2024-08-05 PROCEDURE — 99999 PR PBB SHADOW E&M-EST. PATIENT-LVL III: CPT | Mod: PBBFAC,,, | Performed by: PHYSICIAN ASSISTANT

## 2024-08-05 PROCEDURE — 1159F MED LIST DOCD IN RCRD: CPT | Mod: CPTII,S$GLB,, | Performed by: PHYSICIAN ASSISTANT

## 2024-08-05 RX ORDER — METHYLPREDNISOLONE 4 MG/1
TABLET ORAL
Qty: 21 EACH | Refills: 0 | Status: SHIPPED | OUTPATIENT
Start: 2024-08-05 | End: 2024-08-26

## 2024-08-06 ENCOUNTER — TELEPHONE (OUTPATIENT)
Dept: PHARMACY | Facility: CLINIC | Age: 54
End: 2024-08-06
Payer: COMMERCIAL

## 2024-08-07 ENCOUNTER — OFFICE VISIT (OUTPATIENT)
Dept: OPTOMETRY | Facility: CLINIC | Age: 54
End: 2024-08-07
Payer: COMMERCIAL

## 2024-08-07 DIAGNOSIS — E11.9 TYPE 2 DIABETES MELLITUS WITHOUT RETINOPATHY: Primary | ICD-10-CM

## 2024-08-07 DIAGNOSIS — H52.203 ASTIGMATISM OF BOTH EYES WITH PRESBYOPIA: ICD-10-CM

## 2024-08-07 DIAGNOSIS — H52.4 ASTIGMATISM OF BOTH EYES WITH PRESBYOPIA: ICD-10-CM

## 2024-08-07 PROCEDURE — 2023F DILAT RTA XM W/O RTNOPTHY: CPT | Mod: CPTII,S$GLB,, | Performed by: OPTOMETRIST

## 2024-08-07 PROCEDURE — 92015 DETERMINE REFRACTIVE STATE: CPT | Mod: S$GLB,,, | Performed by: OPTOMETRIST

## 2024-08-07 PROCEDURE — 92014 COMPRE OPH EXAM EST PT 1/>: CPT | Mod: S$GLB,,, | Performed by: OPTOMETRIST

## 2024-08-07 PROCEDURE — 4010F ACE/ARB THERAPY RXD/TAKEN: CPT | Mod: CPTII,S$GLB,, | Performed by: OPTOMETRIST

## 2024-08-23 DIAGNOSIS — F33.1 MODERATE EPISODE OF RECURRENT MAJOR DEPRESSIVE DISORDER: ICD-10-CM

## 2024-08-23 DIAGNOSIS — I10 ESSENTIAL HYPERTENSION: ICD-10-CM

## 2024-08-23 RX ORDER — BUPROPION HYDROCHLORIDE 300 MG/1
300 TABLET ORAL
Qty: 90 TABLET | Refills: 0 | OUTPATIENT
Start: 2024-08-23

## 2024-08-23 RX ORDER — VALSARTAN 160 MG/1
160 TABLET ORAL
Qty: 90 TABLET | Refills: 0 | OUTPATIENT
Start: 2024-08-23

## 2024-08-23 NOTE — TELEPHONE ENCOUNTER
Care Due:                  Date            Visit Type   Department     Provider  --------------------------------------------------------------------------------                                MYCHART                              FOLLOWUP/OF  [facility] PRIMARY   Corine Aguilera  Last Visit: 07-      FICE VISIT   CARE           Brandon  Next Visit: None Scheduled  None         None Found                                                            Last  Test          Frequency    Reason                     Performed    Due Date  --------------------------------------------------------------------------------    Office Visit  12 months..  buPROPion, busPIRone,      07- 07-                             pravastatin, semaglutide,                             sildenafiL, valsartan....    CMP.........  12 months..  pravastatin, valsartan...  07- 07-    HBA1C.......  6 months...  semaglutide..............  07-   01-    Lipid Panel.  12 months..  pravastatin..............  07- 07-    [facility] Embedded Care Due Messages. Reference number: [id number].   8/23/2024 11:13:48 AM CDT   [id number]

## 2024-08-24 DIAGNOSIS — F41.1 GAD (GENERALIZED ANXIETY DISORDER): ICD-10-CM

## 2024-08-24 DIAGNOSIS — E78.5 HYPERLIPIDEMIA, UNSPECIFIED HYPERLIPIDEMIA TYPE: ICD-10-CM

## 2024-08-24 DIAGNOSIS — E11.9 TYPE 2 DIABETES MELLITUS WITHOUT COMPLICATION, WITHOUT LONG-TERM CURRENT USE OF INSULIN: ICD-10-CM

## 2024-08-24 NOTE — TELEPHONE ENCOUNTER
No care due was identified.  [facility] Embedded Care Due Messages. Reference number: [id number].   8/24/2024 2:49:29 PM CDT

## 2024-08-26 ENCOUNTER — TELEPHONE (OUTPATIENT)
Dept: PRIMARY CARE CLINIC | Facility: CLINIC | Age: 54
End: 2024-08-26
Payer: COMMERCIAL

## 2024-08-26 DIAGNOSIS — E11.9 TYPE 2 DIABETES MELLITUS WITHOUT COMPLICATION, WITHOUT LONG-TERM CURRENT USE OF INSULIN: Primary | ICD-10-CM

## 2024-08-26 DIAGNOSIS — Z00.00 ROUTINE MEDICAL EXAM: ICD-10-CM

## 2024-08-26 DIAGNOSIS — Z12.5 PROSTATE CANCER SCREENING: ICD-10-CM

## 2024-08-26 RX ORDER — PRAVASTATIN SODIUM 40 MG/1
40 TABLET ORAL DAILY
Qty: 90 TABLET | Refills: 0 | Status: SHIPPED | OUTPATIENT
Start: 2024-08-26

## 2024-08-26 RX ORDER — BUSPIRONE HYDROCHLORIDE 15 MG/1
15 TABLET ORAL 2 TIMES DAILY
Qty: 180 TABLET | Refills: 0 | Status: SHIPPED | OUTPATIENT
Start: 2024-08-26

## 2024-08-26 RX ORDER — SEMAGLUTIDE 0.68 MG/ML
0.5 INJECTION, SOLUTION SUBCUTANEOUS
Qty: 3 ML | Refills: 0 | Status: SHIPPED | OUTPATIENT
Start: 2024-08-26

## 2024-08-28 ENCOUNTER — PATIENT OUTREACH (OUTPATIENT)
Dept: ADMINISTRATIVE | Facility: HOSPITAL | Age: 54
End: 2024-08-28
Payer: COMMERCIAL

## 2024-08-28 NOTE — PROGRESS NOTES
Health Maintenance Due   Topic Date Due    Colorectal Cancer Screening  08/22/2021    Foot Exam  07/18/2024        Chart review done.   HM updated.   Immunizations reviewed & updated.   Care Everywhere updated.

## 2024-09-04 ENCOUNTER — PATIENT MESSAGE (OUTPATIENT)
Dept: PRIMARY CARE CLINIC | Facility: CLINIC | Age: 54
End: 2024-09-04
Payer: COMMERCIAL

## 2024-09-04 ENCOUNTER — LAB VISIT (OUTPATIENT)
Dept: LAB | Facility: HOSPITAL | Age: 54
End: 2024-09-04
Attending: INTERNAL MEDICINE
Payer: COMMERCIAL

## 2024-09-04 DIAGNOSIS — Z12.5 PROSTATE CANCER SCREENING: ICD-10-CM

## 2024-09-04 DIAGNOSIS — E11.9 TYPE 2 DIABETES MELLITUS WITHOUT COMPLICATION, WITHOUT LONG-TERM CURRENT USE OF INSULIN: ICD-10-CM

## 2024-09-04 DIAGNOSIS — Z00.00 ROUTINE MEDICAL EXAM: ICD-10-CM

## 2024-09-04 LAB
ALBUMIN SERPL BCP-MCNC: 3.9 G/DL (ref 3.5–5.2)
ALP SERPL-CCNC: 85 U/L (ref 55–135)
ALT SERPL W/O P-5'-P-CCNC: 20 U/L (ref 10–44)
ANION GAP SERPL CALC-SCNC: 10 MMOL/L (ref 8–16)
AST SERPL-CCNC: 15 U/L (ref 10–40)
BASOPHILS # BLD AUTO: 0.05 K/UL (ref 0–0.2)
BASOPHILS NFR BLD: 0.6 % (ref 0–1.9)
BILIRUB SERPL-MCNC: 1 MG/DL (ref 0.1–1)
BUN SERPL-MCNC: 16 MG/DL (ref 6–20)
CALCIUM SERPL-MCNC: 9.5 MG/DL (ref 8.7–10.5)
CHLORIDE SERPL-SCNC: 105 MMOL/L (ref 95–110)
CHOLEST SERPL-MCNC: 181 MG/DL (ref 120–199)
CHOLEST/HDLC SERPL: 4.5 {RATIO} (ref 2–5)
CO2 SERPL-SCNC: 23 MMOL/L (ref 23–29)
COMPLEXED PSA SERPL-MCNC: 1.3 NG/ML (ref 0–4)
CREAT SERPL-MCNC: 1 MG/DL (ref 0.5–1.4)
DIFFERENTIAL METHOD BLD: ABNORMAL
EOSINOPHIL # BLD AUTO: 0.5 K/UL (ref 0–0.5)
EOSINOPHIL NFR BLD: 5.5 % (ref 0–8)
ERYTHROCYTE [DISTWIDTH] IN BLOOD BY AUTOMATED COUNT: 12.8 % (ref 11.5–14.5)
EST. GFR  (NO RACE VARIABLE): >60 ML/MIN/1.73 M^2
ESTIMATED AVG GLUCOSE: 126 MG/DL (ref 68–131)
GLUCOSE SERPL-MCNC: 99 MG/DL (ref 70–110)
HBA1C MFR BLD: 6 % (ref 4–5.6)
HCT VFR BLD AUTO: 47.9 % (ref 40–54)
HDLC SERPL-MCNC: 40 MG/DL (ref 40–75)
HDLC SERPL: 22.1 % (ref 20–50)
HGB BLD-MCNC: 15.9 G/DL (ref 14–18)
IMM GRANULOCYTES # BLD AUTO: 0.04 K/UL (ref 0–0.04)
IMM GRANULOCYTES NFR BLD AUTO: 0.4 % (ref 0–0.5)
LDLC SERPL CALC-MCNC: 87.4 MG/DL (ref 63–159)
LYMPHOCYTES # BLD AUTO: 3.1 K/UL (ref 1–4.8)
LYMPHOCYTES NFR BLD: 34 % (ref 18–48)
MCH RBC QN AUTO: 28.4 PG (ref 27–31)
MCHC RBC AUTO-ENTMCNC: 33.2 G/DL (ref 32–36)
MCV RBC AUTO: 86 FL (ref 82–98)
MONOCYTES # BLD AUTO: 0.9 K/UL (ref 0.3–1)
MONOCYTES NFR BLD: 9.6 % (ref 4–15)
NEUTROPHILS # BLD AUTO: 4.5 K/UL (ref 1.8–7.7)
NEUTROPHILS NFR BLD: 49.9 % (ref 38–73)
NONHDLC SERPL-MCNC: 141 MG/DL
NRBC BLD-RTO: 0 /100 WBC
PLATELET # BLD AUTO: 295 K/UL (ref 150–450)
PMV BLD AUTO: 8.6 FL (ref 9.2–12.9)
POTASSIUM SERPL-SCNC: 4.1 MMOL/L (ref 3.5–5.1)
PROT SERPL-MCNC: 7.1 G/DL (ref 6–8.4)
RBC # BLD AUTO: 5.59 M/UL (ref 4.6–6.2)
SODIUM SERPL-SCNC: 138 MMOL/L (ref 136–145)
TRIGL SERPL-MCNC: 268 MG/DL (ref 30–150)
WBC # BLD AUTO: 9.07 K/UL (ref 3.9–12.7)

## 2024-09-04 PROCEDURE — 80061 LIPID PANEL: CPT | Performed by: INTERNAL MEDICINE

## 2024-09-04 PROCEDURE — 83036 HEMOGLOBIN GLYCOSYLATED A1C: CPT | Performed by: INTERNAL MEDICINE

## 2024-09-04 PROCEDURE — 36415 COLL VENOUS BLD VENIPUNCTURE: CPT | Performed by: INTERNAL MEDICINE

## 2024-09-04 PROCEDURE — 85025 COMPLETE CBC W/AUTO DIFF WBC: CPT | Performed by: INTERNAL MEDICINE

## 2024-09-04 PROCEDURE — 80053 COMPREHEN METABOLIC PANEL: CPT | Performed by: INTERNAL MEDICINE

## 2024-09-04 PROCEDURE — 84153 ASSAY OF PSA TOTAL: CPT | Performed by: INTERNAL MEDICINE

## 2024-09-06 ENCOUNTER — OFFICE VISIT (OUTPATIENT)
Dept: PRIMARY CARE CLINIC | Facility: CLINIC | Age: 54
End: 2024-09-06
Payer: COMMERCIAL

## 2024-09-06 VITALS
SYSTOLIC BLOOD PRESSURE: 130 MMHG | HEIGHT: 69 IN | DIASTOLIC BLOOD PRESSURE: 72 MMHG | WEIGHT: 248.69 LBS | HEART RATE: 81 BPM | TEMPERATURE: 98 F | OXYGEN SATURATION: 99 % | RESPIRATION RATE: 18 BRPM | BODY MASS INDEX: 36.83 KG/M2

## 2024-09-06 DIAGNOSIS — E11.9 TYPE 2 DIABETES MELLITUS WITHOUT COMPLICATION, WITHOUT LONG-TERM CURRENT USE OF INSULIN: ICD-10-CM

## 2024-09-06 DIAGNOSIS — N52.9 ERECTILE DYSFUNCTION, UNSPECIFIED ERECTILE DYSFUNCTION TYPE: ICD-10-CM

## 2024-09-06 DIAGNOSIS — F33.1 MODERATE EPISODE OF RECURRENT MAJOR DEPRESSIVE DISORDER: ICD-10-CM

## 2024-09-06 DIAGNOSIS — E78.5 HYPERLIPIDEMIA, UNSPECIFIED HYPERLIPIDEMIA TYPE: ICD-10-CM

## 2024-09-06 DIAGNOSIS — E66.01 SEVERE OBESITY (BMI 35.0-39.9) WITH COMORBIDITY: ICD-10-CM

## 2024-09-06 DIAGNOSIS — Z00.00 ROUTINE MEDICAL EXAM: Primary | ICD-10-CM

## 2024-09-06 DIAGNOSIS — K51.90 ULCERATIVE COLITIS WITHOUT COMPLICATIONS, UNSPECIFIED LOCATION: ICD-10-CM

## 2024-09-06 DIAGNOSIS — F41.1 GAD (GENERALIZED ANXIETY DISORDER): ICD-10-CM

## 2024-09-06 DIAGNOSIS — I10 ESSENTIAL HYPERTENSION: ICD-10-CM

## 2024-09-06 PROCEDURE — 99999 PR PBB SHADOW E&M-EST. PATIENT-LVL IV: CPT | Mod: PBBFAC,,, | Performed by: INTERNAL MEDICINE

## 2024-09-06 RX ORDER — VALSARTAN 160 MG/1
160 TABLET ORAL DAILY
Qty: 90 TABLET | Refills: 2 | Status: SHIPPED | OUTPATIENT
Start: 2024-09-06

## 2024-09-06 RX ORDER — SILDENAFIL 50 MG/1
50 TABLET, FILM COATED ORAL DAILY PRN
Qty: 6 TABLET | Refills: 5 | Status: SHIPPED | OUTPATIENT
Start: 2024-09-06

## 2024-09-06 RX ORDER — SEMAGLUTIDE 1.34 MG/ML
1 INJECTION, SOLUTION SUBCUTANEOUS
Qty: 3 ML | Refills: 5 | Status: SHIPPED | OUTPATIENT
Start: 2024-09-06

## 2024-09-06 RX ORDER — BUPROPION HYDROCHLORIDE 300 MG/1
300 TABLET ORAL DAILY
Qty: 90 TABLET | Refills: 2 | Status: SHIPPED | OUTPATIENT
Start: 2024-09-06

## 2024-09-06 RX ORDER — PRAVASTATIN SODIUM 40 MG/1
40 TABLET ORAL DAILY
Qty: 90 TABLET | Refills: 2 | Status: SHIPPED | OUTPATIENT
Start: 2024-09-06

## 2024-09-06 RX ORDER — BUSPIRONE HYDROCHLORIDE 15 MG/1
15 TABLET ORAL 2 TIMES DAILY
Qty: 180 TABLET | Refills: 1 | Status: SHIPPED | OUTPATIENT
Start: 2024-09-06

## 2024-09-06 NOTE — PROGRESS NOTES
Subjective     Patient ID: Jl Araiza is a 54 y.o. male.    Chief Complaint: Annual Exam    Last seen 14 months ago. Returns for annual physical. Mostly compliant with meds, did let himself run out of a couple of things, and often forgets to take once weekly Ozempic. Weight loss seems to have plateaud. Not much appetite suppression on 0.5 mg dose, still eats three meals a day. Exercising regularly except paused for a few weeks after spraining his knee.      PMH:   Hypertension.   Diabetes type 2.  Hyperlipidemia.   Ulcerative Colitis, GI following.  Iron Def Anemia  Allergic Rhinitis  Depression/Anxiety.  Obesity.   Eye exam 8/24. Podiatry 12/18. EGD 6/16. Colonoscopy 8/19 - was to repeat in 2 years. PSA 1.2 July '21. BMD normal 6/17. Vaccines reviewed, just updated Flu shot and COVID booster.     PSH: Umbilical Hernia Repair.      Social: Non-smoker, rare alcohol.  with a son. , now working at the [employer].      NKDA.    Medications: list reviewed and reconciled.        Review of Systems   Constitutional:  Negative for activity change, appetite change, chills, fatigue, fever and unexpected weight change.   HENT:  Negative for nasal congestion, ear pain, hearing loss, postnasal drip, rhinorrhea, sore throat, trouble swallowing and voice change.    Eyes:  Negative for pain and visual disturbance.   Respiratory:  Negative for apnea, cough, chest tightness, shortness of breath and wheezing.    Cardiovascular:  Negative for chest pain, palpitations and leg swelling.   Gastrointestinal:  Negative for abdominal pain, blood in stool, constipation, diarrhea, nausea and vomiting.   Genitourinary:  Negative for difficulty urinating, dysuria, frequency, hematuria, scrotal swelling and urgency.   Musculoskeletal:  Positive for arthralgias. Negative for back pain, gait problem, myalgias, neck pain and neck stiffness.   Integumentary:  Negative for color change and rash.   Neurological:  Negative for  "dizziness, seizures, syncope, weakness, numbness and headaches.   Hematological:  Negative for adenopathy. Does not bruise/bleed easily.   Psychiatric/Behavioral:  Negative for agitation, dysphoric mood and sleep disturbance. The patient is not nervous/anxious.         Mood stable on current meds, he is very happy at his new job.           Objective   Vitals:    09/06/24 0939   BP: 130/72   BP Location: Right arm   Patient Position: Sitting   BP Method: Medium (Manual)   Pulse: 81   Resp: 18   Temp: 98.1 °F (36.7 °C)   SpO2: 99%   Weight: 112.8 kg (248 lb 10.9 oz)   Height: 5' 9" (1.753 m)   BMI=36.7  Physical Exam  Constitutional:       General: He is not in acute distress.     Appearance: Normal appearance. He is well-developed. He is not diaphoretic.   HENT:      Head: Normocephalic and atraumatic.      Right Ear: Tympanic membrane, ear canal and external ear normal.      Left Ear: Tympanic membrane, ear canal and external ear normal.      Nose: Nose normal. No congestion.      Mouth/Throat:      Mouth: Mucous membranes are moist.      Pharynx: Oropharynx is clear.   Eyes:      General: No scleral icterus.     Extraocular Movements: Extraocular movements intact.      Conjunctiva/sclera: Conjunctivae normal.      Pupils: Pupils are equal, round, and reactive to light.   Neck:      Thyroid: No thyromegaly.      Vascular: No carotid bruit or JVD.   Cardiovascular:      Rate and Rhythm: Normal rate and regular rhythm.      Pulses: Normal pulses.      Heart sounds: Normal heart sounds. No murmur heard.     No friction rub. No gallop.   Pulmonary:      Effort: Pulmonary effort is normal. No respiratory distress.      Breath sounds: Normal breath sounds. No wheezing, rhonchi or rales.   Abdominal:      General: Bowel sounds are normal. There is no distension.      Palpations: Abdomen is soft. There is no mass.      Tenderness: There is no abdominal tenderness.      Hernia: No hernia is present.   Musculoskeletal:        "  General: No tenderness or deformity. Normal range of motion.      Cervical back: Normal range of motion and neck supple.      Right lower leg: No edema.      Left lower leg: No edema.   Lymphadenopathy:      Cervical: No cervical adenopathy.   Skin:     General: Skin is warm and dry.      Findings: No rash.   Neurological:      General: No focal deficit present.      Mental Status: He is alert and oriented to person, place, and time.      Cranial Nerves: No cranial nerve deficit.      Motor: No weakness or abnormal muscle tone.      Coordination: Coordination normal.      Gait: Gait normal.   Psychiatric:         Mood and Affect: Mood and affect normal.         Speech: Speech normal.         Behavior: Behavior normal.         Thought Content: Thought content normal.         Judgment: Judgment normal.       Lab Visit on 09/04/2024   Component Date Value    Microalbumin, Urine 09/04/2024 <5.0     Creatinine, Urine 09/04/2024 129.0     Microalb/Creat Ratio 09/04/2024 Unable to calculate    Lab Visit on 09/04/2024   Component Date Value    PSA, Screen 09/04/2024 1.3     Hemoglobin A1C 09/04/2024 6.0 (H)     Estimated Avg Glucose 09/04/2024 126     Cholesterol 09/04/2024 181     Triglycerides 09/04/2024 268 (H)     HDL 09/04/2024 40     LDL Cholesterol 09/04/2024 87.4     HDL/Cholesterol Ratio 09/04/2024 22.1     Total Cholesterol/HDL Ra* 09/04/2024 4.5     Non-HDL Cholesterol 09/04/2024 141     Sodium 09/04/2024 138     Potassium 09/04/2024 4.1     Chloride 09/04/2024 105     CO2 09/04/2024 23     Glucose 09/04/2024 99     BUN 09/04/2024 16     Creatinine 09/04/2024 1.0     Calcium 09/04/2024 9.5     Total Protein 09/04/2024 7.1     Albumin 09/04/2024 3.9     Total Bilirubin 09/04/2024 1.0     Alkaline Phosphatase 09/04/2024 85     AST 09/04/2024 15     ALT 09/04/2024 20     eGFR 09/04/2024 >60.0     Anion Gap 09/04/2024 10     WBC 09/04/2024 9.07     RBC 09/04/2024 5.59     Hemoglobin 09/04/2024 15.9     Hematocrit  09/04/2024 47.9     MCV 09/04/2024 86     MCH 09/04/2024 28.4     MCHC 09/04/2024 33.2     RDW 09/04/2024 12.8     Platelets 09/04/2024 295     MPV 09/04/2024 8.6 (L)     Immature Granulocytes 09/04/2024 0.4     Gran # (ANC) 09/04/2024 4.5     Immature Grans (Abs) 09/04/2024 0.04     Lymph # 09/04/2024 3.1     Mono # 09/04/2024 0.9     Eos # 09/04/2024 0.5     Baso # 09/04/2024 0.05     nRBC 09/04/2024 0     Gran % 09/04/2024 49.9     Lymph % 09/04/2024 34.0     Mono % 09/04/2024 9.6     Eosinophil % 09/04/2024 5.5     Basophil % 09/04/2024 0.6     Differential Method 09/04/2024 Automated         Assessment and Plan     1. Routine medical exam    2. Type 2 diabetes mellitus without complication, without long-term current use of insulin - controlled.  -     Basic Metabolic Panel; Future; Expected date: 09/06/2024  -     Hemoglobin A1C; Future; Expected date: 09/06/2024  -     semaglutide (OZEMPIC) 1 mg/dose (4 mg/3 mL); Inject 1 mg into the skin every 7 days.  Dispense: 3 mL; Refill: 5    3. Essential hypertension controlled  -     valsartan (DIOVAN) 160 MG tablet; Take 1 tablet (160 mg total) by mouth once daily.  Dispense: 90 tablet; Refill: 2    4. Hyperlipidemia, unspecified hyperlipidemia type - at goal.   -     pravastatin (PRAVACHOL) 40 MG tablet; Take 1 tablet (40 mg total) by mouth once daily.  Dispense: 90 tablet; Refill: 2    5. MYAH (generalized anxiety disorder) - stable.   -     busPIRone (BUSPAR) 15 MG tablet; Take 1 tablet (15 mg total) by mouth 2 (two) times daily.  Dispense: 180 tablet; Refill: 1    6. Moderate episode of recurrent major depressive disorder  -     buPROPion (WELLBUTRIN XL) 300 MG 24 hr tablet; Take 1 tablet (300 mg total) by mouth once daily.  Dispense: 90 tablet; Refill: 2    7. Severe obesity (BMI 35.0-39.9) with comorbidity        -     counseled on diet and exercise, increased Ozempic to 1 mg weekly - encouraged compliance with weekly dosing.     8. Ulcerative colitis without  complications, unspecified location        -     He will follow up with Gastroenterology for med refills and colonoscopy.    9. Erectile dysfunction, unspecified erectile dysfunction type  -     sildenafiL (VIAGRA) 50 MG tablet; Take 1 tablet (50 mg total) by mouth daily as needed for Erectile Dysfunction.  Dispense: 6 tablet; Refill: 5       Follow up in about 6 months (around 3/6/2025).

## 2024-09-07 ENCOUNTER — TELEPHONE (OUTPATIENT)
Dept: PHARMACY | Facility: CLINIC | Age: 54
End: 2024-09-07
Payer: COMMERCIAL

## 2024-09-07 NOTE — TELEPHONE ENCOUNTER
[facility]/Population Health Chart Review & Patient Outreach Details For Medication Adherence Project    Reason for Outreach Encounter: 3rd Party payor non-compliance report ([employer], [employer], C, etc)  2.  Patient Outreach Method: Reviewed Patient Chart  3.   Medication in question: ozempic   LAST FILLED: 9/4/24 for 28 day supply  Diabetes Medications               semaglutide (OZEMPIC) 1 mg/dose (4 mg/3 mL) Inject 1 mg into the skin every 7 days.              4.  Reviewed and or Updates Made To: Patient Chart  5. Outreach Outcomes and/or actions taken: Patient filled medication and is on track to be adherent

## 2024-10-28 ENCOUNTER — TELEPHONE (OUTPATIENT)
Dept: PHARMACY | Facility: CLINIC | Age: 54
End: 2024-10-28
Payer: COMMERCIAL

## 2024-11-14 ENCOUNTER — TELEPHONE (OUTPATIENT)
Dept: PHARMACY | Facility: CLINIC | Age: 54
End: 2024-11-14
Payer: COMMERCIAL

## 2024-11-14 NOTE — TELEPHONE ENCOUNTER
[facility]/Population [facility] Chart Review & Patient Outreach Details For Medication Adherence Project    Reason for Outreach Encounter: 3rd Party payor non-compliance report ([employer], [employer], C, etc)  2.  Patient Outreach Method: Reviewed Patient Chart  3.   Medication in question: ozempic    LAST FILLED: 11/5/24 for 28 day supply  Diabetes Medications               semaglutide (OZEMPIC) 1 mg/dose (4 mg/3 mL) Inject 1 mg into the skin every 7 days.              4.  Reviewed and or Updates Made To: Patient Chart  5. Outreach Outcomes and/or actions taken: Patient filled medication and is on track to be adherent

## 2024-11-29 ENCOUNTER — PATIENT MESSAGE (OUTPATIENT)
Dept: PRIMARY CARE CLINIC | Facility: CLINIC | Age: 54
End: 2024-11-29
Payer: COMMERCIAL

## 2024-12-20 ENCOUNTER — TELEPHONE (OUTPATIENT)
Dept: PHARMACY | Facility: CLINIC | Age: 54
End: 2024-12-20
Payer: COMMERCIAL

## 2024-12-21 NOTE — TELEPHONE ENCOUNTER
[facility]/Population Health Chart Review & Patient Outreach Details For Medication Adherence Project    Reason for Outreach Encounter: 3rd Party payor non-compliance report ([employer], BCBS, C, etc)  2.  Patient Outreach Method: [employer] message  3.   Medication in question:    Diabetes Medications               semaglutide (OZEMPIC) 1 mg/dose (4 mg/3 mL) Inject 1 mg into the skin every 7 days.                 ozempic  last filled  11/5/24 for 28 day supply      4.  Reviewed and or Updates Made To: Patient Chart  5. Outreach Outcomes and/or actions taken: Patient reminded to  prescription  Additional Notes:

## 2024-12-23 DIAGNOSIS — F41.1 GAD (GENERALIZED ANXIETY DISORDER): ICD-10-CM

## 2024-12-23 DIAGNOSIS — E78.5 HYPERLIPIDEMIA, UNSPECIFIED HYPERLIPIDEMIA TYPE: ICD-10-CM

## 2024-12-23 RX ORDER — PRAVASTATIN SODIUM 40 MG/1
40 TABLET ORAL DAILY
Qty: 90 TABLET | Refills: 0 | Status: SHIPPED | OUTPATIENT
Start: 2024-12-23

## 2024-12-23 RX ORDER — BUSPIRONE HYDROCHLORIDE 15 MG/1
15 TABLET ORAL 2 TIMES DAILY
Qty: 180 TABLET | Refills: 0 | Status: SHIPPED | OUTPATIENT
Start: 2024-12-23

## 2024-12-23 NOTE — TELEPHONE ENCOUNTER
No care due was identified.  [facility] Embedded Care Due Messages. Reference number: [id number].   12/23/2024 8:44:35 AM CST

## 2025-01-30 ENCOUNTER — TELEPHONE (OUTPATIENT)
Dept: PHARMACY | Facility: CLINIC | Age: 55
End: 2025-01-30
Payer: COMMERCIAL

## 2025-01-31 NOTE — TELEPHONE ENCOUNTER
[facility]/Population Health Chart Review & Patient Outreach Details For Medication Adherence Project    Reason for Outreach Encounter: 3rd Party payor non-compliance report ([employer], BCBS, C, etc)  2.  Patient Outreach Method: Reviewed patient chart   3.   Medication in question:    Hyperlipidemia Medications               pravastatin (PRAVACHOL) 40 MG tablet Take 1 tablet (40 mg total) by mouth once daily.                  pravastatin  last filled  12/27 for 90 day supply      4.  Reviewed and or Updates Made To: Patient Chart  5. Outreach Outcomes and/or actions taken: Patient filled medication and is on track to be adherent  Additional Notes:

## 2025-02-25 ENCOUNTER — TELEPHONE (OUTPATIENT)
Dept: PHARMACY | Facility: CLINIC | Age: 55
End: 2025-02-25
Payer: COMMERCIAL

## 2025-02-25 NOTE — TELEPHONE ENCOUNTER
[facility]/Population Health Chart Review & Patient Outreach Details For Medication Adherence Project    Reason for Outreach Encounter: 3rd Party payor non-compliance report ([employer], BCBS, C, etc)  2.  Patient Outreach Method: Reviewed patient chart  and [employer] message  3.   Medication in question:    Diabetes Medications              semaglutide (OZEMPIC) 1 mg/dose (4 mg/3 mL) Inject 1 mg into the skin every 7 days.                 Ozempic 1mg  last filled  12/27/24 for 28 day supply      4.  Reviewed and or Updates Made To: Patient Chart  5. Outreach Outcomes and/or actions taken: Sent inquiry to patient: Waiting for response  Additional Notes:       
NONE

## 2025-03-23 DIAGNOSIS — N52.9 ERECTILE DYSFUNCTION, UNSPECIFIED ERECTILE DYSFUNCTION TYPE: ICD-10-CM

## 2025-03-23 DIAGNOSIS — F41.1 GAD (GENERALIZED ANXIETY DISORDER): ICD-10-CM

## 2025-03-23 DIAGNOSIS — E78.5 HYPERLIPIDEMIA, UNSPECIFIED HYPERLIPIDEMIA TYPE: ICD-10-CM

## 2025-03-23 NOTE — TELEPHONE ENCOUNTER
Care Due:                  Date            Visit Type   Department     Provider  --------------------------------------------------------------------------------                                EP -                              PRIMARY      [facility]   Corineopal Aguilera  Last Visit: 09-      CARE ([facility])   CARE           Brandon  Next Visit: None Scheduled  None         None Found                                                            Last  Test          Frequency    Reason                     Performed    Due Date  --------------------------------------------------------------------------------    HBA1C.......  6 months...  semaglutide..............  09- 03-    [facility] Embedded Care Due Messages. Reference number: [id number].   3/23/2025 3:07:04 PM CDT

## 2025-03-23 NOTE — TELEPHONE ENCOUNTER
No care due was identified.  [facility] Embedded Care Due Messages. Reference number: [id number].   3/23/2025 3:10:39 PM CDT

## 2025-03-24 RX ORDER — BUSPIRONE HYDROCHLORIDE 15 MG/1
15 TABLET ORAL 2 TIMES DAILY
Qty: 180 TABLET | Refills: 0 | Status: SHIPPED | OUTPATIENT
Start: 2025-03-24

## 2025-03-24 RX ORDER — SILDENAFIL 50 MG/1
50 TABLET, FILM COATED ORAL DAILY PRN
Qty: 6 TABLET | Refills: 5 | Status: SHIPPED | OUTPATIENT
Start: 2025-03-24

## 2025-03-24 RX ORDER — PRAVASTATIN SODIUM 40 MG/1
40 TABLET ORAL DAILY
Qty: 90 TABLET | Refills: 1 | Status: SHIPPED | OUTPATIENT
Start: 2025-03-24

## 2025-03-24 NOTE — TELEPHONE ENCOUNTER
Refill Routing Note   Medication(s) are not appropriate for processing by [facility] for the following reason(s):        Outside of protocol    [facility] action(s):  Approve  Route   Requires labs : Yes - A1C            Appointments  past 12m or future 3m with PCP    Date Provider   Last Visit   9/6/2024 Corine Taylor MD   Next Visit   3/23/2025 Corine Taylor MD   ED visits in past 90 days: 0        Note composed:2:09 PM 03/24/2025

## 2025-04-03 ENCOUNTER — TELEPHONE (OUTPATIENT)
Dept: PHARMACY | Facility: CLINIC | Age: 55
End: 2025-04-03
Payer: COMMERCIAL

## 2025-04-03 NOTE — TELEPHONE ENCOUNTER
[facility]/Population Health Chart Review & Patient Outreach Details For Medication Adherence Project    Reason for Outreach Encounter: 3rd Party payor non-compliance report ([employer], BCBS, C, etc)  2.  Patient Outreach Method: Reviewed patient chart   3.   Medication in question:    Diabetes Medications              semaglutide (OZEMPIC) 1 mg/dose (4 mg/3 mL) Inject 1 mg into the skin every 7 days.                 ozempic  last filled  3/28 for 28 day supply      4.  Reviewed and or Updates Made To: Patient Chart  5. Outreach Outcomes and/or actions taken: Patient filled medication and is on track to be adherent  Additional Notes:

## 2025-05-09 ENCOUNTER — TELEPHONE (OUTPATIENT)
Dept: PHARMACY | Facility: CLINIC | Age: 55
End: 2025-05-09
Payer: COMMERCIAL

## 2025-05-09 DIAGNOSIS — E11.9 TYPE 2 DIABETES MELLITUS WITHOUT COMPLICATION, WITHOUT LONG-TERM CURRENT USE OF INSULIN: ICD-10-CM

## 2025-05-09 RX ORDER — SEMAGLUTIDE 1.34 MG/ML
1 INJECTION, SOLUTION SUBCUTANEOUS
Qty: 9 ML | Refills: 0 | Status: SHIPPED | OUTPATIENT
Start: 2025-05-09

## 2025-05-09 NOTE — TELEPHONE ENCOUNTER
Refill Routing Note   Medication(s) are not appropriate for processing by [facility] for the following reason(s):        Required labs outdated    [facility] action(s):  Defer   Requires labs : Yes             Appointments  past 12m or future 3m with PCP    Date Provider   Last Visit   9/6/2024 Corine Taylor MD   Next Visit   6/23/2025 Corine Taylor MD   ED visits in past 90 days: 0        Note composed:5:50 PM 05/09/2025

## 2025-05-09 NOTE — TELEPHONE ENCOUNTER
[facility]/Population Health Chart Review & Patient Outreach Details For Medication Adherence Project    Reason for Outreach Encounter: 3rd Party payor non-compliance report (Hum[employer], BCBS, [employer], etc) and Follow up to a previous patient outreach  2.  Patient Outreach Method: Reviewed patient chart  and [place] [facility] message  3.   Medication in question:    Diabetes Medications              semaglutide (OZEMPIC) 1 mg/dose (4 mg/3 mL) Inject 1 mg into the skin every 7 days.                 LF 28 ds 3/28/25    4.  Reviewed and or Updates Made To: Patient Chart  5. Outreach Outcomes and/or actions taken: Patient reminded to  prescription and Patient requested refill to be sent to their pharmacy  Additional Notes:

## 2025-05-09 NOTE — TELEPHONE ENCOUNTER
Medication Adherence [facility]     I am reaching out to request a refill authorization for Mr. Araiza for their prescribed medication listed below.     Diabetes Medications              semaglutide (OZEMPIC) 1 mg/dose (4 mg/3 mL) Inject 1 mg into the skin every 7 days.                Please send a refill over to [facility]. Let me know if any additional information is needed.     Sena Bingham  Clinical Pharmacist, [facility]     Encounter Provider SUKHDEV LOPEZ is live with [facility]:

## 2025-05-09 NOTE — TELEPHONE ENCOUNTER
No care due was identified.  [facility] Embedded Care Due Messages. Reference number: [id number].   5/09/2025 8:24:55 AM CDT

## 2025-05-12 ENCOUNTER — TELEPHONE (OUTPATIENT)
Dept: ENDOSCOPY | Facility: HOSPITAL | Age: 55
End: 2025-05-12
Payer: COMMERCIAL

## 2025-05-12 ENCOUNTER — OFFICE VISIT (OUTPATIENT)
Dept: URGENT CARE | Facility: CLINIC | Age: 55
End: 2025-05-12
Payer: COMMERCIAL

## 2025-05-12 ENCOUNTER — TELEPHONE (OUTPATIENT)
Dept: PRIMARY CARE CLINIC | Facility: CLINIC | Age: 55
End: 2025-05-12

## 2025-05-12 VITALS
OXYGEN SATURATION: 97 % | HEIGHT: 69 IN | SYSTOLIC BLOOD PRESSURE: 121 MMHG | RESPIRATION RATE: 18 BRPM | BODY MASS INDEX: 35.89 KG/M2 | DIASTOLIC BLOOD PRESSURE: 88 MMHG | HEART RATE: 88 BPM | TEMPERATURE: 98 F | WEIGHT: 242.31 LBS

## 2025-05-12 VITALS — BODY MASS INDEX: 36.57 KG/M2 | WEIGHT: 246.94 LBS | HEIGHT: 69 IN

## 2025-05-12 DIAGNOSIS — Z12.11 SPECIAL SCREENING FOR MALIGNANT NEOPLASMS, COLON: ICD-10-CM

## 2025-05-12 DIAGNOSIS — K51.90 ULCERATIVE COLITIS WITHOUT COMPLICATIONS, UNSPECIFIED LOCATION: ICD-10-CM

## 2025-05-12 DIAGNOSIS — K52.9 IBD (INFLAMMATORY BOWEL DISEASE): Primary | ICD-10-CM

## 2025-05-12 DIAGNOSIS — K51.911 ACUTE ULCERATIVE COLITIS WITH RECTAL BLEEDING: Primary | ICD-10-CM

## 2025-05-12 DIAGNOSIS — E11.9 TYPE 2 DIABETES MELLITUS WITHOUT COMPLICATION, WITHOUT LONG-TERM CURRENT USE OF INSULIN: Primary | ICD-10-CM

## 2025-05-12 DIAGNOSIS — R19.7 DIARRHEA, UNSPECIFIED TYPE: ICD-10-CM

## 2025-05-12 PROCEDURE — 99214 OFFICE O/P EST MOD 30 MIN: CPT | Mod: S$GLB,,,

## 2025-05-12 RX ORDER — SODIUM, POTASSIUM,MAG SULFATES 17.5-3.13G
1 SOLUTION, RECONSTITUTED, ORAL ORAL DAILY
Qty: 1 KIT | Refills: 0 | Status: SHIPPED | OUTPATIENT
Start: 2025-05-12 | End: 2025-05-14

## 2025-05-12 RX ORDER — PREDNISONE 20 MG/1
40 TABLET ORAL DAILY
Qty: 14 TABLET | Refills: 0 | Status: SHIPPED | OUTPATIENT
Start: 2025-05-12 | End: 2025-05-19

## 2025-05-12 NOTE — TELEPHONE ENCOUNTER
Patient is scheduled for a Colonoscopy on 6/23/25 with Dr. THELMA Marie  Referral for procedure from [facility]

## 2025-05-12 NOTE — TELEPHONE ENCOUNTER
Please schedule non-fasting labs prior to office visit on 5/29. If you are unable to reach him, portal message has been sent advising him to see that the labs get scheduled.

## 2025-05-12 NOTE — PATIENT INSTRUCTIONS
Take prednisone (steroid) as directed x 7 days. Take in the morning so it does not keep you up at night. Take with food and water. This medication can increase your BP and sugar level. Please monitor and eat a well balanced diet. Avoid high salt content foods and eat more foods with potassium, magnesium and calcium.     Try a bland diet. I included information on this in your discharge paperwork. Avoid acidic/spicy/processed foods as this will worsen your symptoms.     You can take over the counter pepto or imodium for symptoms but please understand that over use can cause constipation.     Tylenol for pain. Avoid NSAIDs (ibuprofen/advil/aleve) which can cause abdominal pain.     Drink plenty of fluids---water and electrolyte replacement drinks like Pedialyte.    The recommended daily fluid intake for men is 3.7 liters per day (seven 16 oz bottles of water).      Follow up with your GI specialist for recheck in a few days.     Go to the ER if your abdominal pain becomes severe, unable to orally hydrate, chest pain, shortness of breath, palpitations, dizziness, large blood loss.

## 2025-05-12 NOTE — PROGRESS NOTES
"Subjective:      Patient ID: Jl Araiza is a 55 y.o. male.    Vitals:  height is 5' 9" (1.753 m) and weight is 109.9 kg (242 lb 4.8 oz). His oral temperature is 97.8 °F (36.6 °C). His blood pressure is 121/88 and his pulse is 88. His respiration is 18 and oxygen saturation is 97%.     Chief Complaint: Other Misc (Stress-induced colitis. I have an appointment for tomorrow to schedule a colonoscopy. I have an appointment with my PCP in June. The last time I was symptomatic, they gave me a course of Prednisone which cleared up the issue in a week. Apriso? - Entered by patient)    Patient is having a colitis episode x 1 month. 10-12 episodes of diarrhea daily. Sometimes with bright red blood. No rectal pain. Stomach upset right before BM but improves. No fevers. No recent antibiotic use or international travel. Last flare years ago but with similar symptoms. Reports something he's been dealing with for a couple decades.  He has a colonoscopy scheduled 17 days from now. Last time he had this, prednisone worked for him. He has been having lose stools for about a month now and sometimes it has bright red blood in it. Patient feels like his stress at work is contributing to his issues.     Diarrhea   This is a new problem. The current episode started 1 to 4 weeks ago. The stool consistency is described as Bloody. The patient states that diarrhea does not awaken him from sleep. Pertinent negatives include no abdominal pain, chills, coughing, fever, headaches, sweats or vomiting. The symptoms are aggravated by stress. He has tried nothing for the symptoms.       Constitution: Negative for chills, fatigue and fever.   Cardiovascular:  Negative for chest pain.   Respiratory:  Negative for cough.    Gastrointestinal:  Positive for diarrhea and bright red blood in stool. Negative for abdominal pain, nausea and vomiting.   Neurological:  Negative for dizziness, light-headedness and headaches.      Objective:     Physical " Exam   Constitutional: He is oriented to person, place, and time. He appears well-developed.   HENT:   Head: Normocephalic and atraumatic.   Ears:   Right Ear: External ear normal.   Left Ear: External ear normal.   Nose: Nose normal.   Mouth/Throat: Mucous membranes are normal.   Eyes: Conjunctivae and lids are normal.   Neck: Trachea normal. Neck supple.   Cardiovascular: Normal rate, regular rhythm and normal heart sounds.   Pulmonary/Chest: Effort normal and breath sounds normal. No stridor. No respiratory distress. He has no wheezes. He has no rhonchi. He has no rales.   Abdominal: Normal appearance and bowel sounds are normal. He exhibits no distension and no mass. Soft. There is no abdominal tenderness. There is no rebound and no guarding.   Musculoskeletal: Normal range of motion.         General: Normal range of motion.   Neurological: He is alert and oriented to person, place, and time. He has normal strength.   Skin: Skin is warm, dry, intact, not diaphoretic and not pale.   Psychiatric: His speech is normal and behavior is normal. Judgment and thought content normal.   Nursing note and vitals reviewed.      Assessment:     1. Acute ulcerative colitis with rectal bleeding    2. Diarrhea, unspecified type        Plan:       Acute ulcerative colitis with rectal bleeding  -     predniSONE (DELTASONE) 20 MG tablet; Take 2 tablets (40 mg total) by mouth once daily. for 7 days  Dispense: 14 tablet; Refill: 0    Diarrhea, unspecified type  -     Stool culture; Future; Expected date: 05/12/2025  -     Stool Exam-Ova,Cysts,Parasites; Future; Expected date: 05/12/2025  -     Clostridioides difficile EIA; Future; Expected date: 05/12/2025            Discussed results/diagnosis/plan with patient in clinic. Strict precautions given to patient to monitor for worsening signs and symptoms. Advised to follow up with PCP or specialist.  Explained side effects of medications prescribed with patient and informed him/her to  discontinue use if he/she has any side effects and to inform UC or PCP if this occurs. All questions answered. Strict ED verses clinic return precautions stressed and given in depth. Advised if symptoms worsens of fail to improve he/she should go to the Emergency Room. Discharge and follow-up instructions given verbally/printed with the patient who expressed understanding and willingness to comply with my recommendations. Patient voiced understanding and in agreement with current treatment plan. Patient exits the exam room in no acute distress. Conversant and engaged during discharge discussion, verbalized understanding.

## 2025-05-13 ENCOUNTER — CLINICAL SUPPORT (OUTPATIENT)
Dept: ENDOSCOPY | Facility: HOSPITAL | Age: 55
End: 2025-05-13
Attending: INTERNAL MEDICINE
Payer: COMMERCIAL

## 2025-05-13 ENCOUNTER — TELEPHONE (OUTPATIENT)
Dept: ENDOSCOPY | Facility: HOSPITAL | Age: 55
End: 2025-05-13

## 2025-05-13 DIAGNOSIS — K52.9 IBD (INFLAMMATORY BOWEL DISEASE): ICD-10-CM

## 2025-05-14 ENCOUNTER — RESULTS FOLLOW-UP (OUTPATIENT)
Dept: URGENT CARE | Facility: CLINIC | Age: 55
End: 2025-05-14

## 2025-05-20 ENCOUNTER — LAB VISIT (OUTPATIENT)
Dept: LAB | Facility: HOSPITAL | Age: 55
End: 2025-05-20
Payer: COMMERCIAL

## 2025-05-20 DIAGNOSIS — K51.90 ULCERATIVE COLITIS WITHOUT COMPLICATIONS, UNSPECIFIED LOCATION: ICD-10-CM

## 2025-05-20 DIAGNOSIS — E11.9 TYPE 2 DIABETES MELLITUS WITHOUT COMPLICATION, WITHOUT LONG-TERM CURRENT USE OF INSULIN: ICD-10-CM

## 2025-05-20 LAB
ABSOLUTE EOSINOPHIL (OHS): 0.38 K/UL
ABSOLUTE MONOCYTE (OHS): 1.52 K/UL (ref 0.3–1)
ABSOLUTE NEUTROPHIL COUNT (OHS): 7.72 K/UL (ref 1.8–7.7)
ALBUMIN SERPL BCP-MCNC: 3.1 G/DL (ref 3.5–5.2)
ALP SERPL-CCNC: 93 UNIT/L (ref 40–150)
ALT SERPL W/O P-5'-P-CCNC: 18 UNIT/L (ref 10–44)
ANION GAP (OHS): 13 MMOL/L (ref 8–16)
AST SERPL-CCNC: 10 UNIT/L (ref 11–45)
BASOPHILS # BLD AUTO: 0.03 K/UL
BASOPHILS NFR BLD AUTO: 0.2 %
BILIRUB SERPL-MCNC: 0.9 MG/DL (ref 0.1–1)
BUN SERPL-MCNC: 13 MG/DL (ref 6–20)
CALCIUM SERPL-MCNC: 9.3 MG/DL (ref 8.7–10.5)
CHLORIDE SERPL-SCNC: 97 MMOL/L (ref 95–110)
CO2 SERPL-SCNC: 26 MMOL/L (ref 23–29)
CREAT SERPL-MCNC: 1 MG/DL (ref 0.5–1.4)
CRP SERPL-MCNC: 30.7 MG/L
EAG (OHS): 237 MG/DL (ref 68–131)
ERYTHROCYTE [DISTWIDTH] IN BLOOD BY AUTOMATED COUNT: 13.7 % (ref 11.5–14.5)
GFR SERPLBLD CREATININE-BSD FMLA CKD-EPI: >60 ML/MIN/1.73/M2
GLUCOSE SERPL-MCNC: 214 MG/DL (ref 70–110)
HBA1C MFR BLD: 9.9 % (ref 4–5.6)
HCT VFR BLD AUTO: 46 % (ref 40–54)
HGB BLD-MCNC: 15.1 GM/DL (ref 14–18)
IMM GRANULOCYTES # BLD AUTO: 0.47 K/UL (ref 0–0.04)
IMM GRANULOCYTES NFR BLD AUTO: 3.8 % (ref 0–0.5)
LYMPHOCYTES # BLD AUTO: 2.13 K/UL (ref 1–4.8)
MCH RBC QN AUTO: 28.2 PG (ref 27–31)
MCHC RBC AUTO-ENTMCNC: 32.8 G/DL (ref 32–36)
MCV RBC AUTO: 86 FL (ref 82–98)
NUCLEATED RBC (/100WBC) (OHS): 0 /100 WBC
PLATELET # BLD AUTO: 445 K/UL (ref 150–450)
PMV BLD AUTO: 8.2 FL (ref 9.2–12.9)
POTASSIUM SERPL-SCNC: 3.6 MMOL/L (ref 3.5–5.1)
PROT SERPL-MCNC: 7.1 GM/DL (ref 6–8.4)
RBC # BLD AUTO: 5.35 M/UL (ref 4.6–6.2)
RELATIVE EOSINOPHIL (OHS): 3.1 %
RELATIVE LYMPHOCYTE (OHS): 17.4 % (ref 18–48)
RELATIVE MONOCYTE (OHS): 12.4 % (ref 4–15)
RELATIVE NEUTROPHIL (OHS): 63.1 % (ref 38–73)
SODIUM SERPL-SCNC: 136 MMOL/L (ref 136–145)
WBC # BLD AUTO: 12.25 K/UL (ref 3.9–12.7)

## 2025-05-20 PROCEDURE — 85025 COMPLETE CBC W/AUTO DIFF WBC: CPT

## 2025-05-20 PROCEDURE — 36415 COLL VENOUS BLD VENIPUNCTURE: CPT

## 2025-05-20 PROCEDURE — 86140 C-REACTIVE PROTEIN: CPT

## 2025-05-20 PROCEDURE — 82435 ASSAY OF BLOOD CHLORIDE: CPT

## 2025-05-20 PROCEDURE — 83036 HEMOGLOBIN GLYCOSYLATED A1C: CPT

## 2025-05-21 ENCOUNTER — PATIENT MESSAGE (OUTPATIENT)
Dept: PRIMARY CARE CLINIC | Facility: CLINIC | Age: 55
End: 2025-05-21
Payer: COMMERCIAL

## 2025-05-22 ENCOUNTER — TELEPHONE (OUTPATIENT)
Dept: GASTROENTEROLOGY | Facility: CLINIC | Age: 55
End: 2025-05-22
Payer: COMMERCIAL

## 2025-05-22 ENCOUNTER — DOCUMENTATION ONLY (OUTPATIENT)
Dept: GASTROENTEROLOGY | Facility: HOSPITAL | Age: 55
End: 2025-05-22
Payer: COMMERCIAL

## 2025-05-22 DIAGNOSIS — K51.90 ULCERATIVE COLITIS WITHOUT COMPLICATIONS, UNSPECIFIED LOCATION: ICD-10-CM

## 2025-05-22 RX ORDER — PREDNISONE 10 MG/1
40 TABLET ORAL DAILY
Qty: 70 TABLET | Refills: 1 | Status: SHIPPED | OUTPATIENT
Start: 2025-05-22

## 2025-05-22 RX ORDER — MESALAMINE 0.38 G/1
1.5 CAPSULE, EXTENDED RELEASE ORAL DAILY
Qty: 120 CAPSULE | Refills: 4 | Status: SHIPPED | OUTPATIENT
Start: 2025-05-22

## 2025-05-22 NOTE — PROGRESS NOTES
Phone call to pt  Last seen 2019  Left sided UC  Stopped apriso at least one year ago  Now with flare  C diff ruled out    Will do longer course of pred taper and restart arpiso  Already scheduled for colonoscopy, but will need office visit too with Dr Cornell

## 2025-05-22 NOTE — TELEPHONE ENCOUNTER
Patient of Dr Marie's with UC.  He says he is having a flare of his UC. Went to urgent care last week and was given a 7 day course of prednisone which he has completed. Says that the prednisone helped some, but did not completely take care of the symptoms. .  Reports that he is having about 10 watery bloody BMs per day.   He is currently not on any medications for his UC.   He has not been seen in clinic recently, but is scheduled for a upcoming colonoscopy.

## 2025-05-29 ENCOUNTER — OFFICE VISIT (OUTPATIENT)
Dept: PRIMARY CARE CLINIC | Facility: CLINIC | Age: 55
End: 2025-05-29
Payer: COMMERCIAL

## 2025-05-29 VITALS
BODY MASS INDEX: 35.82 KG/M2 | TEMPERATURE: 99 F | SYSTOLIC BLOOD PRESSURE: 126 MMHG | HEIGHT: 69 IN | HEART RATE: 90 BPM | OXYGEN SATURATION: 98 % | DIASTOLIC BLOOD PRESSURE: 74 MMHG | RESPIRATION RATE: 20 BRPM | WEIGHT: 241.88 LBS

## 2025-05-29 DIAGNOSIS — E11.65 TYPE 2 DIABETES MELLITUS WITH HYPERGLYCEMIA, WITHOUT LONG-TERM CURRENT USE OF INSULIN: Primary | ICD-10-CM

## 2025-05-29 DIAGNOSIS — I10 ESSENTIAL HYPERTENSION: ICD-10-CM

## 2025-05-29 DIAGNOSIS — E66.01 SEVERE OBESITY (BMI 35.0-39.9) WITH COMORBIDITY: ICD-10-CM

## 2025-05-29 DIAGNOSIS — Z23 NEED FOR PNEUMOCOCCAL VACCINE: ICD-10-CM

## 2025-05-29 DIAGNOSIS — E78.5 HYPERLIPIDEMIA, UNSPECIFIED HYPERLIPIDEMIA TYPE: ICD-10-CM

## 2025-05-29 DIAGNOSIS — F41.1 GAD (GENERALIZED ANXIETY DISORDER): ICD-10-CM

## 2025-05-29 DIAGNOSIS — F33.1 MODERATE EPISODE OF RECURRENT MAJOR DEPRESSIVE DISORDER: ICD-10-CM

## 2025-05-29 DIAGNOSIS — K51.90 ULCERATIVE COLITIS WITHOUT COMPLICATIONS, UNSPECIFIED LOCATION: ICD-10-CM

## 2025-05-29 PROCEDURE — 99999 PR PBB SHADOW E&M-EST. PATIENT-LVL IV: CPT | Mod: PBBFAC,,, | Performed by: INTERNAL MEDICINE

## 2025-05-29 PROCEDURE — 3078F DIAST BP <80 MM HG: CPT | Mod: CPTII,S$GLB,, | Performed by: INTERNAL MEDICINE

## 2025-05-29 PROCEDURE — G2211 COMPLEX E/M VISIT ADD ON: HCPCS | Mod: S$GLB,,, | Performed by: INTERNAL MEDICINE

## 2025-05-29 PROCEDURE — 1159F MED LIST DOCD IN RCRD: CPT | Mod: CPTII,S$GLB,, | Performed by: INTERNAL MEDICINE

## 2025-05-29 PROCEDURE — 3072F LOW RISK FOR RETINOPATHY: CPT | Mod: CPTII,S$GLB,, | Performed by: INTERNAL MEDICINE

## 2025-05-29 PROCEDURE — 3008F BODY MASS INDEX DOCD: CPT | Mod: CPTII,S$GLB,, | Performed by: INTERNAL MEDICINE

## 2025-05-29 PROCEDURE — 3046F HEMOGLOBIN A1C LEVEL >9.0%: CPT | Mod: CPTII,S$GLB,, | Performed by: INTERNAL MEDICINE

## 2025-05-29 PROCEDURE — 3074F SYST BP LT 130 MM HG: CPT | Mod: CPTII,S$GLB,, | Performed by: INTERNAL MEDICINE

## 2025-05-29 PROCEDURE — 4010F ACE/ARB THERAPY RXD/TAKEN: CPT | Mod: CPTII,S$GLB,, | Performed by: INTERNAL MEDICINE

## 2025-05-29 PROCEDURE — 99214 OFFICE O/P EST MOD 30 MIN: CPT | Mod: S$GLB,,, | Performed by: INTERNAL MEDICINE

## 2025-05-29 PROCEDURE — 1160F RVW MEDS BY RX/DR IN RCRD: CPT | Mod: CPTII,S$GLB,, | Performed by: INTERNAL MEDICINE

## 2025-05-29 RX ORDER — TIRZEPATIDE 7.5 MG/.5ML
7.5 INJECTION, SOLUTION SUBCUTANEOUS
Qty: 4 PEN | Refills: 1 | Status: SHIPPED | OUTPATIENT
Start: 2025-05-29

## 2025-05-29 RX ORDER — BUPROPION HYDROCHLORIDE 300 MG/1
300 TABLET ORAL DAILY
Qty: 90 TABLET | Refills: 1 | Status: SHIPPED | OUTPATIENT
Start: 2025-05-29

## 2025-05-29 RX ORDER — PRAVASTATIN SODIUM 40 MG/1
40 TABLET ORAL DAILY
Qty: 90 TABLET | Refills: 1 | Status: SHIPPED | OUTPATIENT
Start: 2025-05-29

## 2025-05-29 RX ORDER — BUSPIRONE HYDROCHLORIDE 15 MG/1
15 TABLET ORAL 2 TIMES DAILY
Qty: 180 TABLET | Refills: 1 | Status: SHIPPED | OUTPATIENT
Start: 2025-05-29

## 2025-05-29 RX ORDER — VALSARTAN 160 MG/1
160 TABLET ORAL DAILY
Qty: 90 TABLET | Refills: 1 | Status: SHIPPED | OUTPATIENT
Start: 2025-05-29

## 2025-05-29 NOTE — PROGRESS NOTES
Subjective     Patient ID: Jl Araiza is a 55 y.o. male.    Chief Complaint: Follow-up    Last seen 8 months ago for annual physical with diabetes well controlled. Returning later than planned again. Labs in advance show diabetes uncontrolled, >3 point rise in HbA1c. He reports frequent missed doses of weekly Ozempic due to family obligations on the weekend when he takes it. Also, has been under a lot of stress after losing two employees at work and having to take on their responsibilities in recent months. Admits his diet was not good during that time. And recently on high dose Prednisone for a flare of Colitis, but this treatment just started two weeks ago. Has Colonoscopy scheduled next month. He complains that Ozempic has not been effective at suppressing his appetite lately.     PMH:   Hypertension.   Diabetes type 2. HbA1c 6.0% Sep. '24.  Hyperlipidemia.  LDL 87 Sep. '24.   Ulcerative Colitis, GI following.  Iron Def Anemia  Allergic Rhinitis  Depression/Anxiety.  Obesity.   Eye exam 8/24. Podiatry 12/18. EGD 6/16. Colonoscopy 8/19 - was to repeat in 2 years. PSA 1.2 July '21. BMD normal 6/17. Vaccines reviewed.     PSH: Umbilical Hernia Repair.      Social: Non-smoker, rare alcohol.  with a son. , now working at the [employer].      NKDA.    Medications: list reviewed and reconciled.      Review of Systems   Constitutional:  Negative for fever and unexpected weight change.   Respiratory:  Negative for cough and shortness of breath.    Cardiovascular:  Negative for chest pain, palpitations and leg swelling.   Gastrointestinal:         Abdominal pain and bloody diarrhea have subsided on Prednisone.   Genitourinary:  Negative for dysuria and frequency.   Musculoskeletal:  Negative for arthralgias and myalgias.   Integumentary:  Negative for rash and wound.   Neurological:  Negative for dizziness, syncope, weakness, numbness and headaches.   Psychiatric/Behavioral:          Mood  "improving, no change in medical management requested.           Objective   Vitals:    05/29/25 0803   BP: 126/74   BP Location: Right arm   Patient Position: Sitting   Pulse: 90   Resp: 20   Temp: 98.5 °F (36.9 °C)   TempSrc: Oral   SpO2: 98%   Weight: 109.7 kg (241 lb 13.5 oz)   Height: 5' 9" (1.753 m)   BMI=35.7  Physical Exam  Constitutional:       General: He is not in acute distress.     Appearance: He is obese.   Cardiovascular:      Rate and Rhythm: Normal rate and regular rhythm.      Pulses: Normal pulses.   Pulmonary:      Effort: Pulmonary effort is normal. No respiratory distress.      Breath sounds: Normal breath sounds. No wheezing, rhonchi or rales.   Abdominal:      General: Bowel sounds are normal. There is no distension.      Palpations: Abdomen is soft.      Tenderness: There is no abdominal tenderness.   Musculoskeletal:         General: Normal range of motion.      Right lower leg: No edema.      Left lower leg: No edema.      Comments: Protective Sensation:  Right: Intact  Left: Intact    Visual Inspection:  Normal -  Bilateral except dystrophic toenails both great toes.    Pedal Pulses:   Right: Present  Left: Present    Posterior Tibialis Pulses:   Right:Present  Left: Present       Skin:     General: Skin is warm and dry.   Neurological:      Mental Status: He is alert.      Cranial Nerves: No cranial nerve deficit.      Coordination: Coordination normal.      Gait: Gait normal.   Psychiatric:         Mood and Affect: Mood normal.         Behavior: Behavior normal.       Lab Visit on 05/20/2025   Component Date Value    Sodium 05/20/2025 136     Potassium 05/20/2025 3.6     Chloride 05/20/2025 97     CO2 05/20/2025 26     Glucose 05/20/2025 214 (H)     BUN 05/20/2025 13     Creatinine 05/20/2025 1.0     Calcium 05/20/2025 9.3     Protein Total 05/20/2025 7.1     Albumin 05/20/2025 3.1 (L)     Bilirubin Total 05/20/2025 0.9     ALP 05/20/2025 93     AST 05/20/2025 10 (L)     ALT 05/20/2025 " 18     Anion Gap 05/20/2025 13     eGFR 05/20/2025 >60     Hemoglobin A1c 05/20/2025 9.9 (H)     Estimated Average Glucose 05/20/2025 237 (H)     CRP 05/20/2025 30.7 (H)     WBC 05/20/2025 12.25     RBC 05/20/2025 5.35     HGB 05/20/2025 15.1     HCT 05/20/2025 46.0     MCV 05/20/2025 86     MCH 05/20/2025 28.2     MCHC 05/20/2025 32.8     RDW 05/20/2025 13.7     Platelet Count 05/20/2025 445     MPV 05/20/2025 8.2 (L)     Nucleated RBC 05/20/2025 0     Neut % 05/20/2025 63.1     Lymph % 05/20/2025 17.4 (L)     Mono % 05/20/2025 12.4     Eos % 05/20/2025 3.1     Basophil % 05/20/2025 0.2     Imm Grans % 05/20/2025 3.8 (H)     Neut # 05/20/2025 7.72 (H)     Lymph # 05/20/2025 2.13     Mono # 05/20/2025 1.52 (H)     Eos # 05/20/2025 0.38     Baso # 05/20/2025 0.03     Imm Grans # 05/20/2025 0.47 (H)    Lab Visit on 05/13/2025   Component Date Value    Stool Culture 05/13/2025 No Salmonella, Shigella, Vibrio, Yersinia isolated.     Ova and Parasite, Micros* 05/13/2025 SEE COMMENTS     C. DIFFICILE GDH AG 05/13/2025 Negative     Clostridioides difficile* 05/13/2025 Negative     Campylobacter Ag 05/13/2025 Negative     SHIGA TOXIN 1 ([facility]) 05/13/2025 Negative     SHIGA TOXIN 2 ([facility]) 05/13/2025 Negative         Assessment and Plan     1. Type 2 diabetes mellitus with hyperglycemia, without long-term current use of insulin  -     Change Semaglutide to Tirzepatide (MOUNJARO) 7.5 mg/0.5 mL PnIj; Inject 7.5 mg into the skin every 7 days.  Dispense: 4 Pen; Refill: 1 - AND COMMIT TO WEEKLY DOSING.           -    dose may be titrated after the first month if well tolerated.     2. Essential hypertension controlled  -     valsartan (DIOVAN) 160 MG tablet; Take 1 tablet (160 mg total) by mouth once daily.  Dispense: 90 tablet; Refill: 1    3. Hyperlipidemia, unspecified hyperlipidemia type - LDL controlled  -     pravastatin (PRAVACHOL) 40 MG tablet; Take 1 tablet (40 mg total) by mouth once daily.  Dispense: 90 tablet; Refill:  1    4. Severe obesity (BMI 35.0-39.9) with comorbidity        -     Tirzepatide, diet, exercise.     5. Ulcerative colitis without complications, unspecified location        -     continue Prednisone as prescribed by GI, Colonoscopy as scheduled.    6. Moderate episode of recurrent major depressive disorder  -     buPROPion (WELLBUTRIN XL) 300 MG 24 hr tablet; Take 1 tablet (300 mg total) by mouth once daily.  Dispense: 90 tablet; Refill: 1    7. MYAH (generalized anxiety disorder)  -     busPIRone (BUSPAR) 15 MG tablet; Take 1 tablet (15 mg total) by mouth 2 (two) times daily.  Dispense: 180 tablet; Refill: 1    8. Need for pneumococcal vaccine - Prevnar 20 today.       Follow up in about 3 months (around 9/12/2025) for Annual Physical. .

## 2025-06-18 ENCOUNTER — TELEPHONE (OUTPATIENT)
Dept: PHARMACY | Facility: CLINIC | Age: 55
End: 2025-06-18
Payer: COMMERCIAL

## 2025-06-18 NOTE — TELEPHONE ENCOUNTER
[facility]/Population Health Chart Review & Patient Outreach Details For Medication Adherence Project    Reason for Outreach Encounter: 3rd Party payor non-compliance report ([employer], [employer], C, etc)  2.  Patient Outreach Method: Reviewed Patient Chart  3.   Medication in question: Mounjaro 7.5mg   LAST FILLED: 6/4/25 for 28 day supply  Diabetes Medications              tirzepatide (MOUNJARO) 7.5 mg/0.5 mL PnIj Inject 7.5 mg into the skin every 7 days.              4.  Reviewed and or Updates Made To: Patient Chart  5. Outreach Outcomes and/or actions taken: Patient filled medication and is on track to be adherent

## 2025-06-23 ENCOUNTER — HOSPITAL ENCOUNTER (OUTPATIENT)
Facility: HOSPITAL | Age: 55
Discharge: HOME | End: 2025-06-23
Attending: INTERNAL MEDICINE | Admitting: INTERNAL MEDICINE
Payer: COMMERCIAL

## 2025-06-23 ENCOUNTER — PATIENT MESSAGE (OUTPATIENT)
Dept: GASTROENTEROLOGY | Facility: CLINIC | Age: 55
End: 2025-06-23

## 2025-06-23 ENCOUNTER — TELEPHONE (OUTPATIENT)
Dept: ENDOSCOPY | Facility: HOSPITAL | Age: 55
End: 2025-06-23
Payer: COMMERCIAL

## 2025-06-23 ENCOUNTER — ANESTHESIA (OUTPATIENT)
Dept: ENDOSCOPY | Facility: HOSPITAL | Age: 55
End: 2025-06-23
Payer: COMMERCIAL

## 2025-06-23 ENCOUNTER — ANESTHESIA EVENT (OUTPATIENT)
Dept: ENDOSCOPY | Facility: HOSPITAL | Age: 55
End: 2025-06-23
Payer: COMMERCIAL

## 2025-06-23 VITALS
DIASTOLIC BLOOD PRESSURE: 73 MMHG | TEMPERATURE: 98 F | BODY MASS INDEX: 33.6 KG/M2 | SYSTOLIC BLOOD PRESSURE: 116 MMHG | OXYGEN SATURATION: 97 % | HEART RATE: 80 BPM | WEIGHT: 227.5 LBS | RESPIRATION RATE: 19 BRPM

## 2025-06-23 DIAGNOSIS — K52.9 IBD (INFLAMMATORY BOWEL DISEASE): ICD-10-CM

## 2025-06-23 LAB — POCT GLUCOSE: 93 MG/DL (ref 70–110)

## 2025-06-23 PROCEDURE — 45380 COLONOSCOPY AND BIOPSY: CPT | Mod: 33 | Performed by: INTERNAL MEDICINE

## 2025-06-23 PROCEDURE — 88305 TISSUE EXAM BY PATHOLOGIST: CPT | Mod: TC,91 | Performed by: INTERNAL MEDICINE

## 2025-06-23 PROCEDURE — 37000008 HC ANESTHESIA 1ST 15 MINUTES: Performed by: INTERNAL MEDICINE

## 2025-06-23 PROCEDURE — 27201012 HC FORCEPS, HOT/COLD, DISP: Performed by: INTERNAL MEDICINE

## 2025-06-23 PROCEDURE — 45380 COLONOSCOPY AND BIOPSY: CPT | Mod: 33,,, | Performed by: INTERNAL MEDICINE

## 2025-06-23 PROCEDURE — 37000009 HC ANESTHESIA EA ADD 15 MINS: Performed by: INTERNAL MEDICINE

## 2025-06-23 PROCEDURE — 63600175 PHARM REV CODE 636 W HCPCS: Performed by: NURSE ANESTHETIST, CERTIFIED REGISTERED

## 2025-06-23 PROCEDURE — 25000003 PHARM REV CODE 250: Performed by: NURSE ANESTHETIST, CERTIFIED REGISTERED

## 2025-06-23 PROCEDURE — 82962 GLUCOSE BLOOD TEST: CPT | Performed by: INTERNAL MEDICINE

## 2025-06-23 RX ORDER — LIDOCAINE HYDROCHLORIDE 20 MG/ML
INJECTION INTRAVENOUS
Status: DISCONTINUED | OUTPATIENT
Start: 2025-06-23 | End: 2025-06-23

## 2025-06-23 RX ORDER — PROPOFOL 10 MG/ML
VIAL (ML) INTRAVENOUS CONTINUOUS PRN
Status: DISCONTINUED | OUTPATIENT
Start: 2025-06-23 | End: 2025-06-23

## 2025-06-23 RX ORDER — PROPOFOL 10 MG/ML
VIAL (ML) INTRAVENOUS
Status: DISCONTINUED | OUTPATIENT
Start: 2025-06-23 | End: 2025-06-23

## 2025-06-23 RX ORDER — SODIUM CHLORIDE 9 MG/ML
INJECTION, SOLUTION INTRAVENOUS CONTINUOUS
Status: DISCONTINUED | OUTPATIENT
Start: 2025-06-23 | End: 2025-06-23 | Stop reason: HOSPADM

## 2025-06-23 RX ADMIN — SODIUM CHLORIDE: 0.9 INJECTION, SOLUTION INTRAVENOUS at 11:06

## 2025-06-23 RX ADMIN — PROPOFOL 150 MCG/KG/MIN: 10 INJECTION, EMULSION INTRAVENOUS at 11:06

## 2025-06-23 RX ADMIN — LIDOCAINE HYDROCHLORIDE 100 MG: 20 INJECTION INTRAVENOUS at 11:06

## 2025-06-23 RX ADMIN — PROPOFOL 70 MG: 10 INJECTION, EMULSION INTRAVENOUS at 11:06

## 2025-06-23 NOTE — ANESTHESIA PREPROCEDURE EVALUATION
06/23/2025  Jl Araiza is a 55 y.o., male.    Past Medical History:   Diagnosis Date    Abdominal hernia     Anxiety     Elevated bilirubin 04/25/2022    Likely due to [place] per labs 5/2022    Essential hypertension 11/02/2015    MYAH (generalized anxiety disorder) 02/21/2018    Gilbert's disease 05/13/2022    Per labs 5/2022: decreased UGT1A1 activity and increased risk for hyperbilirubinemia and/or toxicity  with atazanavir, belinostat, dolutegravir, irinotecan, nilotinib, pazopanib, sacituzumab govitecan-hziy, and potentially other medications metabolized primarily by UGT1A1. Genotype is consistent with a diagnosis of Gilbert syndrome.     Hyperlipidemia     Lumbar disc disease with radiculopathy 08/07/2020    Obese     Other specified noninfective gastroenteritis and colitis     Recurrent major depressive disorder 03/22/2022    Type 2 diabetes mellitus without complication, without long-term current use of insulin 11/02/2015    UC (ulcerative colitis)      Past Surgical History:   Procedure Laterality Date    COLONOSCOPY N/A 6/15/2016    Procedure: COLONOSCOPY;  Surgeon: Edmund Marie MD;  Location: [address]);  Service: Endoscopy;  Laterality: N/A;  Patient reports that during his last Colonoscopy, he did not fall asleep.    COLONOSCOPY N/A 8/4/2017    Procedure: COLONOSCOPY;  Surgeon: Mya Booth MD;  Location: [address]);  Service: Endoscopy;  Laterality: N/A;  schedule as 45 minute case    COLONOSCOPY N/A 8/22/2019    Procedure: COLONOSCOPY;  Surgeon: Edmund Marie MD;  Location: [address]);  Service: Endoscopy;  Laterality: N/A;    EPIDURAL STEROID INJECTION INTO LUMBAR SPINE N/A 5/3/2022    Procedure: Lumbar Epidural Steroid Injection L5-S1 (Oral Sedation);  Surgeon: Fernando Cabezas Jr., MD;  Location: [facility]IN [facility]GT;  Service: Pain Management;   Laterality: N/A;  diabetic       ESOPHAGOGASTRODUODENOSCOPY      HERNIA REPAIR  Umbilical Hernia    TRANSFORAMINAL EPIDURAL INJECTION OF STEROID Right 8/18/2020    Procedure: Injection,steroid,epidural,transforaminal approach--Right L4-5;  Surgeon: Fernando Cabezas Jr., MD;  Location: [facility]IN [facility]GT;  Service: Pain Management;  Laterality: Right;    TRANSFORAMINAL EPIDURAL INJECTION OF STEROID Right 9/8/2020    Procedure: Injection,steroid,epidural,transforaminal approach--right L5-S1 and S1;  Surgeon: Fernando Cabezas Jr., MD;  Location: [facility]IN [facility]GT;  Service: Pain Management;  Laterality: Right;    VASECTOMY       Results for orders placed or performed in visit on 09/03/16   EKG 12-lead    Collection Time: 09/03/16 10:28 AM    Narrative    Test Reason : I10  Blood Pressure :  Vent. Rate : 074 BPM     Atrial Rate : 074 BPM     P-R Int : 168 ms          QRS Dur : 082 ms      QT Int : 394 ms       P-R-T Axes : 057 009 026 degrees     QTc Int : 437 ms    Normal sinus rhythm  Within normal limits  When compared with ECG of 14-OCT-2015 15:38,  No significant change was found  Confirmed by Hernandez Naranjo MD ([id number]) on 9/6/2016 9:42:16 AM    Referred By: KYLE RAMIREZ           Confirmed By:Hernandez Naranjo MD           Pre-op Assessment    I have reviewed the Patient Summary Reports.    I have reviewed the NPO Status.   I have reviewed the Medications.     Review of Systems  Anesthesia Hx:             Denies Family Hx of Anesthesia complications.    Denies Personal Hx of Anesthesia complications.                    Social:  Non-Smoker       Hematology/Oncology:  Hematology Normal   Oncology Normal                                   EENT/Dental:  EENT/Dental Normal           Cardiovascular:     Hypertension                                          Pulmonary:  Pulmonary Normal                       Renal/:  Renal/ Normal                 Hepatic/GI:   PUD,                  Musculoskeletal:  Musculoskeletal Normal                 Neurological:    Neuromuscular Disease,                                   Endocrine:  Diabetes           Dermatological:  Skin Normal    Psych:  Psychiatric History                  Physical Exam  General: Alert, Oriented, Cooperative and Well nourished    Airway:  Mallampati: II   Mouth Opening: Normal  TM Distance: Normal  Tongue: Normal  Neck ROM: Normal ROM    Dental:  Intact        Anesthesia Plan  Type of Anesthesia, risks & benefits discussed:    Anesthesia Type: Gen Natural Airway  Intra-op Monitoring Plan: Standard ASA Monitors  Induction:  IV  Informed Consent: Informed consent signed with the Patient and all parties understand the risks and agree with anesthesia plan.  All questions answered. Patient consented to blood products? No  ASA Score: 2  Day of Surgery Review of History & Physical: H&P Update referred to the surgeon/provider.    Ready For Surgery From Anesthesia Perspective.     .

## 2025-06-23 NOTE — H&P
[facility]  History & Physical    Subjective:      Chief Complaint/Reason for Admission:     Direct access colonoscopy for IBD surveillance.    Jl Araiza is a 55 y.o. male.    Past Medical History:   Diagnosis Date    Abdominal hernia     Anxiety     Elevated bilirubin 04/25/2022    Likely due to [place] per labs 5/2022    Essential hypertension 11/02/2015    MYAH (generalized anxiety disorder) 02/21/2018    Gilbert's disease 05/13/2022    Per labs 5/2022: decreased UGT1A1 activity and increased risk for hyperbilirubinemia and/or toxicity  with atazanavir, belinostat, dolutegravir, irinotecan, nilotinib, pazopanib, sacituzumab govitecan-hziy, and potentially other medications metabolized primarily by UGT1A1. Genotype is consistent with a diagnosis of Gilbert syndrome.     Hyperlipidemia     Lumbar disc disease with radiculopathy 08/07/2020    Obese     Other specified noninfective gastroenteritis and colitis     Recurrent major depressive disorder 03/22/2022    Type 2 diabetes mellitus without complication, without long-term current use of insulin 11/02/2015    UC (ulcerative colitis)      Past Surgical History:   Procedure Laterality Date    COLONOSCOPY N/A 6/15/2016    Procedure: COLONOSCOPY;  Surgeon: Edmund Marie MD;  Location: [address]);  Service: Endoscopy;  Laterality: N/A;  Patient reports that during his last Colonoscopy, he did not fall asleep.    COLONOSCOPY N/A 8/4/2017    Procedure: COLONOSCOPY;  Surgeon: Mya Booth MD;  Location: [address]);  Service: Endoscopy;  Laterality: N/A;  schedule as 45 minute case    COLONOSCOPY N/A 8/22/2019    Procedure: COLONOSCOPY;  Surgeon: Edmund Marie MD;  Location: [address]);  Service: Endoscopy;  Laterality: N/A;    EPIDURAL STEROID INJECTION INTO LUMBAR SPINE N/A 5/3/2022    Procedure: Lumbar Epidural Steroid Injection L5-S1 (Oral Sedation);  Surgeon: Fernando Cabezas Jr., MD;  Location:  [facility] PAIN [facility]GT;  Service: Pain Management;  Laterality: N/A;  diabetic       ESOPHAGOGASTRODUODENOSCOPY      HERNIA REPAIR  Umbilical Hernia    TRANSFORAMINAL EPIDURAL INJECTION OF STEROID Right 8/18/2020    Procedure: Injection,steroid,epidural,transforaminal approach--Right L4-5;  Surgeon: Fernando Cabezas Jr., MD;  Location: [facility] PAIN [facility]GT;  Service: Pain Management;  Laterality: Right;    TRANSFORAMINAL EPIDURAL INJECTION OF STEROID Right 9/8/2020    Procedure: Injection,steroid,epidural,transforaminal approach--right L5-S1 and S1;  Surgeon: Fernando Cabezas Jr., MD;  Location: [facility]IN [facility]GT;  Service: Pain Management;  Laterality: Right;    VASECTOMY       Social History[1]    PTA Medications   Medication Sig    buPROPion (WELLBUTRIN XL) 300 MG 24 hr tablet Take 1 tablet (300 mg total) by mouth once daily.    busPIRone (BUSPAR) 15 MG tablet Take 1 tablet (15 mg total) by mouth 2 (two) times daily.    mesalamine (APRISO) 0.375 gram Cp24 Take 4 capsules (1.5 g total) by mouth once daily.    pravastatin (PRAVACHOL) 40 MG tablet Take 1 tablet (40 mg total) by mouth once daily.    predniSONE (DELTASONE) 10 MG tablet Take 4 tablets (40 mg total) by mouth once daily. Do this for one week, then cut back to 30 mg for one week, then 20 mg for one week, the 10 mg per day for one last week    sildenafiL (VIAGRA) 50 MG tablet Take 1 tablet (50 mg total) by mouth daily as needed for Erectile Dysfunction.    tirzepatide (MOUNJARO) 7.5 mg/0.5 mL PnIj Inject 7.5 mg into the skin every 7 days.    valsartan (DIOVAN) 160 MG tablet Take 1 tablet (160 mg total) by mouth once daily.     Review of patient's allergies indicates:   Allergen Reactions    Cat/feline products Anaphylaxis     Difficulty breathing    Cigarette smoke Shortness Of Breath     Difficulty breathing        Review of Systems   Constitutional:  Negative for fever.       Objective:      Vital Signs (Most Recent)  Temp: 97.7 °F (36.5 °C) (06/23/25 1052)  Pulse: 94  (06/23/25 1052)  Resp: 18 (06/23/25 1052)  BP: 121/81 (06/23/25 1052)  SpO2: 95 % (06/23/25 1052)    Vital Signs Range (Last 24H):  Temp:  [97.7 °F (36.5 °C)]   Pulse:  [94]   Resp:  [18]   BP: (121)/(81)   SpO2:  [95 %]     Physical Exam  Cardiovascular:      Rate and Rhythm: Normal rate.   Pulmonary:      Effort: Pulmonary effort is normal.   Neurological:      Mental Status: He is alert and oriented to person, place, and time.           Assessment:      Direct access colonoscopy for IBD surveillance.      Plan:        Direct access colonoscopy for IBD surveillance.         [1]   Social History  Tobacco Use    Smoking status: Never    Smokeless tobacco: Never   Substance Use Topics    Alcohol use: No     Comment: Infrequently drinks one beer    Drug use: No

## 2025-06-23 NOTE — PROVATION PATIENT INSTRUCTIONS
Discharge Summary/Instructions after an Endoscopic Procedure  Patient Name: Jl Robles  Patient MRN: [id number]  Patient YOB: 1970  Monday, June 23, 2025  Edmund Marie MD  Dear patient,  As a result of recent federal legislation (The Federal Cures Act), you may   receive lab or pathology results from your procedure in your [facility]   account before your physician is able to contact you. Your physician or   their representative will relay the results to you with their   recommendations at their soonest availability.  Thank you,  RESTRICTIONS:  During your procedure today, you received medications for sedation.  These   medications may affect your judgment, balance and coordination.  Therefore,   for 24 hours, you have the following restrictions:   - DO NOT drive a car, operate machinery, make legal/financial decisions,   sign important papers or drink alcohol.    ACTIVITY:  Today: no heavy lifting, straining or running due to procedural   sedation/anesthesia.  The following day: return to full activity including work.  DIET:  Eat and drink normally unless instructed otherwise.     TREATMENT FOR COMMON SIDE EFFECTS:  - Mild abdominal pain, nausea, belching, bloating or excessive gas:  rest,   eat lightly and use a heating pad.  - Sore Throat: treat with throat lozenges and/or gargle with warm salt   water.  - Because air was used during the procedure, expelling large amounts of air   from your rectum or belching is normal.  - If a bowel prep was taken, you may not have a bowel movement for 1-3 days.    This is normal.  SYMPTOMS TO WATCH FOR AND REPORT TO YOUR PHYSICIAN:  1. Abdominal pain or bloating, other than gas cramps.  2. Chest pain.  3. Back pain.  4. Signs of infection such as: chills or fever occurring within 24 hours   after the procedure.  5. Rectal bleeding, which would show as bright red, maroon, or black stools.   (A tablespoon of blood from the rectum is not serious, especially if    hemorrhoids are present.)  6. Vomiting.  7. Weakness or dizziness.  GO DIRECTLY TO THE NEAREST EMERGENCY ROOM IF YOU HAVE ANY OF THE FOLLOWING:      Difficulty breathing              Chills and/or fever over 101 F   Persistent vomiting and/or vomiting blood   Severe abdominal pain   Severe chest pain   Black, tarry stools   Bleeding- more than one tablespoon   Any other symptom or condition that you feel may need urgent attention  Your doctor recommends these additional instructions:  If any biopsies were taken, your doctors clinic will contact you in 1 to 2   weeks with any results.  - Discharge patient to home.   - Patient has a contact number available for emergencies.  The signs and   symptoms of potential delayed complications were discussed with the   patient.  Return to normal activities tomorrow.  Written discharge   instructions were provided to the patient.   - Resume previous diet.   - Continue present medications. Apriso 4 pills by mouth once daily.  - Return to primary care physician as previously scheduled.   - Repeat colonoscopy in 4 months to check healing.   - Discharge patient to home.   - Await pathology results.   - Telephone endoscopist for pathology results in 2 weeks.   - Continue present medications.   - Repeat colonoscopy in 4 months to check healing.   - Return to GI clinic at the next available appointment.   - Please avoid taking NSAIDs (nonsteroidal anti-inflammatory drugs), such as   ibuprofen (Advil, Motrin), naproxen (Aleve, Naprosyn), diclofenac   (Voltaren), meloxicam (Mobic), indomethacin (Indocin), celecoxib   (Celebrex), ketorolac (Toradol), etodolac (Lodine), piroxicam (Feldene),   sulindac (Clinoril), Goodys Powders, and Excedrin Migraine. However, it is   safe to continue taking aspirin if it is needed for cardiovascular   protection. Do NOT STOP your aspirin if your Aspirin has been prescribed   for heart or vascular health.  - The findings and recommendations were discussed  with the patient.  For questions, problems or results please call your physician - Edmund Marie MD at Work:  [phone].  [facility], EMERGENCY ROOM PHONE NUMBER: [phone]  IF A COMPLICATION OR EMERGENCY SITUATION ARISES AND YOU ARE UNABLE TO REACH   YOUR PHYSICIAN - GO DIRECTLY TO THE EMERGENCY ROOM.  Edmund Marie MD  6/23/2025 12:37:27 PM  This report has been verified and signed electronically.  Dear patient,  As a result of recent federal legislation (The Federal Cures Act), you may   receive lab or pathology results from your procedure in your [facility]   account before your physician is able to contact you. Your physician or   their representative will relay the results to you with their   recommendations at their soonest availability.  Thank you,  PROVATION

## 2025-06-23 NOTE — ANESTHESIA POSTPROCEDURE EVALUATION
Anesthesia Post Evaluation    Patient: Jl Araiza    Procedure(s) Performed: Procedure(s) (LRB):  COLONOSCOPY, SCREENING, HIGH RISK PATIENT (N/A)    Final Anesthesia Type: general      Patient location during evaluation: GI PACU  Patient participation: Yes- Able to Participate  Level of consciousness: awake and alert  Post-procedure vital signs: reviewed and stable  Pain management: adequate  Airway patency: patent    PONV status at discharge: No PONV  Anesthetic complications: no      Cardiovascular status: blood pressure returned to baseline  Respiratory status: unassisted  Hydration status: euvolemic  Follow-up not needed.          Vitals Value Taken Time   /72 06/23/25 12:34   Temp 36.4 °C (97.5 °F) 06/23/25 12:19   Pulse 85 06/23/25 12:34   Resp 16 06/23/25 12:34   SpO2 99 % 06/23/25 12:34         No case tracking events are documented in the log.      Pain/Barbra Score: Barbra Score: 10 (6/23/2025 12:20 PM)

## 2025-06-23 NOTE — TRANSFER OF CARE
Anesthesia Transfer of Care Note    Patient: Jl Araiza    Procedure(s) Performed: Procedure(s) (LRB):  COLONOSCOPY, SCREENING, HIGH RISK PATIENT (N/A)    Patient location: PACU    Anesthesia Type: general    Transport from OR: Transported from OR on room air with adequate spontaneous ventilation    Post pain: adequate analgesia    Post assessment: no apparent anesthetic complications    Post vital signs: stable    Level of consciousness: awake    Nausea/Vomiting: no nausea/vomiting    Complications: none    Transfer of care protocol was followed    Last vitals: Visit Vitals  /81 (BP Location: Left arm, Patient Position: Lying)   Pulse 94   Temp 36.5 °C (97.7 °F)   Resp 18   Wt 103.2 kg (227 lb 8.2 oz)   SpO2 95%   BMI 33.60 kg/m²

## 2025-06-25 ENCOUNTER — RESULTS FOLLOW-UP (OUTPATIENT)
Dept: GASTROENTEROLOGY | Facility: HOSPITAL | Age: 55
End: 2025-06-25

## 2025-06-25 ENCOUNTER — DOCUMENTATION ONLY (OUTPATIENT)
Dept: GASTROENTEROLOGY | Facility: HOSPITAL | Age: 55
End: 2025-06-25
Payer: COMMERCIAL

## 2025-06-25 ENCOUNTER — PATIENT MESSAGE (OUTPATIENT)
Dept: GASTROENTEROLOGY | Facility: HOSPITAL | Age: 55
End: 2025-06-25
Payer: COMMERCIAL

## 2025-06-25 LAB
ESTROGEN SERPL-MCNC: NORMAL PG/ML
INSULIN SERPL-ACNC: NORMAL U[IU]/ML
LAB AP CLINICAL INFORMATION: NORMAL
LAB AP GROSS DESCRIPTION: NORMAL
LAB AP PERFORMING LOCATION(S): NORMAL
LAB AP REPORT FOOTNOTES: NORMAL

## 2025-06-25 NOTE — PROGRESS NOTES
Referral placed to endoscopy schedulers    Procedure: Colonoscopy    Diagnosis: Ulcerative colitis follow-up    Procedure Timing:  Please schedule at the end of October or early November 2025    Location: Any Site    Additional Scheduling Information: No scheduling concerns    Prep Specifications:Standard prep    Is the patient taking a GLP-1 Agonist:no but please ask    Have you attached a patient to this message: yes

## 2025-07-17 DIAGNOSIS — E11.65 TYPE 2 DIABETES MELLITUS WITH HYPERGLYCEMIA, WITHOUT LONG-TERM CURRENT USE OF INSULIN: ICD-10-CM

## 2025-07-17 DIAGNOSIS — F41.1 GAD (GENERALIZED ANXIETY DISORDER): ICD-10-CM

## 2025-07-17 DIAGNOSIS — I10 ESSENTIAL HYPERTENSION: ICD-10-CM

## 2025-07-17 DIAGNOSIS — F33.1 MODERATE EPISODE OF RECURRENT MAJOR DEPRESSIVE DISORDER: ICD-10-CM

## 2025-07-17 DIAGNOSIS — E78.5 HYPERLIPIDEMIA, UNSPECIFIED HYPERLIPIDEMIA TYPE: ICD-10-CM

## 2025-07-17 RX ORDER — BUPROPION HYDROCHLORIDE 300 MG/1
300 TABLET ORAL DAILY
Qty: 90 TABLET | Refills: 1 | Status: CANCELLED | OUTPATIENT
Start: 2025-07-17

## 2025-07-17 RX ORDER — PRAVASTATIN SODIUM 40 MG/1
40 TABLET ORAL DAILY
Qty: 90 TABLET | Refills: 1 | Status: CANCELLED | OUTPATIENT
Start: 2025-07-17

## 2025-07-17 RX ORDER — BUSPIRONE HYDROCHLORIDE 15 MG/1
15 TABLET ORAL 2 TIMES DAILY
Qty: 180 TABLET | Refills: 1 | Status: CANCELLED | OUTPATIENT
Start: 2025-07-17

## 2025-07-17 RX ORDER — VALSARTAN 160 MG/1
160 TABLET ORAL DAILY
Qty: 90 TABLET | Refills: 1 | Status: CANCELLED | OUTPATIENT
Start: 2025-07-17

## 2025-07-17 RX ORDER — TIRZEPATIDE 7.5 MG/.5ML
7.5 INJECTION, SOLUTION SUBCUTANEOUS
Qty: 4 PEN | Refills: 1 | Status: CANCELLED | OUTPATIENT
Start: 2025-07-17

## 2025-07-17 NOTE — TELEPHONE ENCOUNTER
No care due was identified.  [facility] Embedded Care Due Messages. Reference number: [id number].   7/17/2025 4:51:51 PM CDT

## 2025-07-18 ENCOUNTER — PATIENT MESSAGE (OUTPATIENT)
Dept: PRIMARY CARE CLINIC | Facility: CLINIC | Age: 55
End: 2025-07-18
Payer: COMMERCIAL

## 2025-07-18 DIAGNOSIS — I10 ESSENTIAL HYPERTENSION: ICD-10-CM

## 2025-07-18 DIAGNOSIS — E78.5 HYPERLIPIDEMIA, UNSPECIFIED HYPERLIPIDEMIA TYPE: ICD-10-CM

## 2025-07-18 DIAGNOSIS — F41.1 GAD (GENERALIZED ANXIETY DISORDER): ICD-10-CM

## 2025-07-18 DIAGNOSIS — E11.65 TYPE 2 DIABETES MELLITUS WITH HYPERGLYCEMIA, WITHOUT LONG-TERM CURRENT USE OF INSULIN: ICD-10-CM

## 2025-07-18 DIAGNOSIS — F33.1 MODERATE EPISODE OF RECURRENT MAJOR DEPRESSIVE DISORDER: ICD-10-CM

## 2025-07-18 RX ORDER — TIRZEPATIDE 10 MG/.5ML
10 INJECTION, SOLUTION SUBCUTANEOUS
Qty: 4 PEN | Refills: 3 | Status: SHIPPED | OUTPATIENT
Start: 2025-07-18

## 2025-07-21 RX ORDER — BUSPIRONE HYDROCHLORIDE 15 MG/1
15 TABLET ORAL 2 TIMES DAILY
Qty: 180 TABLET | Refills: 1 | Status: SHIPPED | OUTPATIENT
Start: 2025-07-21

## 2025-07-21 RX ORDER — TIRZEPATIDE 10 MG/.5ML
10 INJECTION, SOLUTION SUBCUTANEOUS
Qty: 4 PEN | Refills: 3 | Status: SHIPPED | OUTPATIENT
Start: 2025-07-21

## 2025-07-21 RX ORDER — VALSARTAN 160 MG/1
160 TABLET ORAL DAILY
Qty: 90 TABLET | Refills: 3 | Status: SHIPPED | OUTPATIENT
Start: 2025-07-21

## 2025-07-21 RX ORDER — BUPROPION HYDROCHLORIDE 300 MG/1
300 TABLET ORAL DAILY
Qty: 90 TABLET | Refills: 3 | Status: SHIPPED | OUTPATIENT
Start: 2025-07-21

## 2025-07-21 RX ORDER — PRAVASTATIN SODIUM 40 MG/1
40 TABLET ORAL DAILY
Qty: 90 TABLET | Refills: 0 | Status: SHIPPED | OUTPATIENT
Start: 2025-07-21

## 2025-07-21 NOTE — TELEPHONE ENCOUNTER
LOV with Corine Taylor MD , 5/29/2025  Pharmacy change  Pt is out of meds   Management per asthma/allergy. Stable. Continue Singular 10 mg 1 pill at bedtime PRN and AirDuo 113/14 mcg 1 puff twice daily PRN, and Albuterol HFA PRN.

## 2025-07-21 NOTE — TELEPHONE ENCOUNTER
Refill Routing Note   Medication(s) are not appropriate for processing by [facility] for the following reason(s):        Outside of protocol    [facility] action(s):  Route  Approve               Appointments  past 12m or future 3m with PCP    Date Provider   Last Visit   5/29/2025 Corine Taylor MD   Next Visit   9/12/2025 Coirne Taylor MD   ED visits in past 90 days: 0        Note composed:9:47 AM 07/21/2025

## 2025-07-21 NOTE — TELEPHONE ENCOUNTER
No care due was identified.  [facility] Embedded Care Due Messages. Reference number: [id number].   7/21/2025 8:50:26 AM CDT

## 2025-07-21 NOTE — TELEPHONE ENCOUNTER
Refill Routing Note   Medication(s) are not appropriate for processing by [facility] for the following reason(s):        Outside of protocol  Drug-disease interactionDrug-Disease: buPROPion and Elevated bilirubin     [facility] action(s):  Defer  Route  Approve        Medication Therapy Plan: Drug-Disease: buPROPion and Elevated bilirubin; Buspar off protocol; CHANGE IN PHARMACY      Appointments  past 12m or future 3m with PCP    Date Provider   Last Visit   5/29/2025 Corine Taylor MD   Next Visit   9/12/2025 Corine Taylor MD   ED visits in past 90 days: 0        Note composed:9:10 AM 07/21/2025

## 2025-07-23 DIAGNOSIS — I10 ESSENTIAL HYPERTENSION: ICD-10-CM

## 2025-07-23 DIAGNOSIS — F41.1 GAD (GENERALIZED ANXIETY DISORDER): ICD-10-CM

## 2025-07-23 DIAGNOSIS — F33.1 MODERATE EPISODE OF RECURRENT MAJOR DEPRESSIVE DISORDER: ICD-10-CM

## 2025-07-23 RX ORDER — BUSPIRONE HYDROCHLORIDE 15 MG/1
15 TABLET ORAL 2 TIMES DAILY
Qty: 180 TABLET | Refills: 1 | OUTPATIENT
Start: 2025-07-23

## 2025-07-23 RX ORDER — BUPROPION HYDROCHLORIDE 300 MG/1
300 TABLET ORAL DAILY
Qty: 90 TABLET | Refills: 3 | OUTPATIENT
Start: 2025-07-23

## 2025-07-23 RX ORDER — VALSARTAN 160 MG/1
160 TABLET ORAL DAILY
Qty: 90 TABLET | Refills: 3 | OUTPATIENT
Start: 2025-07-23

## 2025-07-23 NOTE — TELEPHONE ENCOUNTER
Copied from CRM #[id number]. Topic: Medications - Pharmacy  >> Jul 23, 2025 10:47 AM Renuka wrote:  Type:  RX Refill Request    Who Called: She   Refill or New Rx:New  RX Name and Strength:buPROPion (WELLBUTRIN XL) 300 MG 24 hr tablet  How is the patient currently taking it? (ex. 1XDay):Take 1 tablet (300 mg total) by mouth once daily. - Oral  Is this a 30 day or 90 day RX: 90   Preferred Pharmacy with phone number:[facility] #[id number] [facility] [address] [facility] [address] [address]  Phone: [phone] Fax: [phone]       Local or Mail Order:Local   Ordering Provider:Dr Taylor   Would the patient rather a call back or a response via [facility]? Call back   Best Call Back Number:  Additional Information:       Who Called: She   Refill or New Rx:New  RX Name and Strength:valsartan (DIOVAN) 160 MG tablet  How is the patient currently taking it? (ex. 1XDay):Take 1 tablet (160 mg total) by mouth once daily. - Oral  Is this a 30 day or 90 day RX:90  Preferred Pharmacy with phone number:[facility] #[id number] [facility] [address] [facility] [address] [address]  Phone: [phone] Fax: [phone]    Local or Mail Order:Local   Ordering Provider:Dr Taylor   Would the patient rather a call back or a response via [facility]? Call back   Best Call Back Number:  Additional Information:       Who Called: She   Refill or New Rx:New  RX Name and Strength:busPIRone (BUSPAR) 15 MG tablet  How is the patient currently taking it? (ex. 1XDay):Take 1 tablet (15 mg total) by mouth 2 (two) times daily. - Oral  Is this a 30 day or 90 day RX:90  Preferred Pharmacy with phone number:[facility] #[id number] [facility] [address] [facility] & [place] [id number] [facility] [address]  Phone: [phone] Fax: [phone]    Local or Mail Order:local   Ordering  Provider:Dr Taylor   Would the patient rather a call back or a response via [facility]? Call back   Best Call Back Number:  Additional Information:

## 2025-08-14 DIAGNOSIS — E11.9 TYPE 2 DIABETES MELLITUS WITHOUT COMPLICATION, UNSPECIFIED WHETHER LONG TERM INSULIN USE: ICD-10-CM

## 2025-08-21 ENCOUNTER — TELEPHONE (OUTPATIENT)
Dept: PHARMACY | Facility: CLINIC | Age: 55
End: 2025-08-21
Payer: COMMERCIAL

## 2025-08-26 ENCOUNTER — TELEPHONE (OUTPATIENT)
Dept: GASTROENTEROLOGY | Facility: CLINIC | Age: 55
End: 2025-08-26
Payer: COMMERCIAL

## (undated) DEVICE — DRESSING LEUKOPLAST FLEX 1X3IN